# Patient Record
Sex: MALE | Race: WHITE | NOT HISPANIC OR LATINO | URBAN - METROPOLITAN AREA
[De-identification: names, ages, dates, MRNs, and addresses within clinical notes are randomized per-mention and may not be internally consistent; named-entity substitution may affect disease eponyms.]

---

## 2017-01-25 ENCOUNTER — INPATIENT (INPATIENT)
Facility: HOSPITAL | Age: 82
LOS: 4 days | Discharge: NURSING, REHAB OR LONG-TERM CARE | DRG: 684 | End: 2017-01-30
Attending: INTERNAL MEDICINE | Admitting: INTERNAL MEDICINE
Payer: MEDICARE

## 2017-01-25 VITALS — WEIGHT: 195.11 LBS | HEIGHT: 67 IN

## 2017-01-25 DIAGNOSIS — I21.4 NON-ST ELEVATION (NSTEMI) MYOCARDIAL INFARCTION: ICD-10-CM
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
## 2017-01-25 LAB
ALBUMIN SERPL ELPH-MCNC: 3.8 G/DL — SIGNIFICANT CHANGE UP (ref 3.5–5)
ALP SERPL-CCNC: 58 U/L — SIGNIFICANT CHANGE UP (ref 40–120)
ALT FLD-CCNC: 33 U/L DA — SIGNIFICANT CHANGE UP (ref 10–60)
ANION GAP SERPL CALC-SCNC: 8 MMOL/L — SIGNIFICANT CHANGE UP (ref 5–17)
APPEARANCE UR: CLEAR — SIGNIFICANT CHANGE UP
APTT BLD: 32.2 SEC — SIGNIFICANT CHANGE UP (ref 27.5–37.4)
AST SERPL-CCNC: 49 U/L — HIGH (ref 10–40)
BASOPHILS # BLD AUTO: 0.1 K/UL — SIGNIFICANT CHANGE UP (ref 0–0.2)
BASOPHILS NFR BLD AUTO: 1.2 % — SIGNIFICANT CHANGE UP (ref 0–2)
BILIRUB SERPL-MCNC: 0.8 MG/DL — SIGNIFICANT CHANGE UP (ref 0.2–1.2)
BILIRUB UR-MCNC: NEGATIVE — SIGNIFICANT CHANGE UP
BUN SERPL-MCNC: 20 MG/DL — HIGH (ref 7–18)
CALCIUM SERPL-MCNC: 9.4 MG/DL — SIGNIFICANT CHANGE UP (ref 8.4–10.5)
CHLORIDE SERPL-SCNC: 99 MMOL/L — SIGNIFICANT CHANGE UP (ref 96–108)
CK SERPL-CCNC: 1147 U/L — HIGH (ref 35–232)
CO2 SERPL-SCNC: 28 MMOL/L — SIGNIFICANT CHANGE UP (ref 22–31)
COLOR SPEC: YELLOW — SIGNIFICANT CHANGE UP
CREAT SERPL-MCNC: 1.32 MG/DL — HIGH (ref 0.5–1.3)
DIFF PNL FLD: ABNORMAL
EOSINOPHIL # BLD AUTO: 0 K/UL — SIGNIFICANT CHANGE UP (ref 0–0.5)
EOSINOPHIL NFR BLD AUTO: 0.1 % — SIGNIFICANT CHANGE UP (ref 0–6)
GLUCOSE SERPL-MCNC: 105 MG/DL — HIGH (ref 70–99)
GLUCOSE UR QL: NEGATIVE — SIGNIFICANT CHANGE UP
HCT VFR BLD CALC: 49.9 % — SIGNIFICANT CHANGE UP (ref 39–50)
HGB BLD-MCNC: 16.2 G/DL — SIGNIFICANT CHANGE UP (ref 13–17)
INR BLD: 1.24 RATIO — HIGH (ref 0.88–1.16)
KETONES UR-MCNC: ABNORMAL
LEUKOCYTE ESTERASE UR-ACNC: NEGATIVE — SIGNIFICANT CHANGE UP
LYMPHOCYTES # BLD AUTO: 0.6 K/UL — LOW (ref 1–3.3)
LYMPHOCYTES # BLD AUTO: 7.7 % — LOW (ref 13–44)
MAGNESIUM SERPL-MCNC: 2 MG/DL — SIGNIFICANT CHANGE UP (ref 1.8–2.4)
MCHC RBC-ENTMCNC: 31.1 PG — SIGNIFICANT CHANGE UP (ref 27–34)
MCHC RBC-ENTMCNC: 32.4 GM/DL — SIGNIFICANT CHANGE UP (ref 32–36)
MCV RBC AUTO: 96.2 FL — SIGNIFICANT CHANGE UP (ref 80–100)
MONOCYTES # BLD AUTO: 0.8 K/UL — SIGNIFICANT CHANGE UP (ref 0–0.9)
MONOCYTES NFR BLD AUTO: 11 % — SIGNIFICANT CHANGE UP (ref 2–14)
NEUTROPHILS # BLD AUTO: 6.1 K/UL — SIGNIFICANT CHANGE UP (ref 1.8–7.4)
NEUTROPHILS NFR BLD AUTO: 79.9 % — HIGH (ref 43–77)
NITRITE UR-MCNC: NEGATIVE — SIGNIFICANT CHANGE UP
NT-PROBNP SERPL-SCNC: 2660 PG/ML — HIGH (ref 0–450)
PH UR: 6 — SIGNIFICANT CHANGE UP (ref 4.8–8)
PLATELET # BLD AUTO: 167 K/UL — SIGNIFICANT CHANGE UP (ref 150–400)
POTASSIUM SERPL-MCNC: 4.5 MMOL/L — SIGNIFICANT CHANGE UP (ref 3.5–5.3)
POTASSIUM SERPL-SCNC: 4.5 MMOL/L — SIGNIFICANT CHANGE UP (ref 3.5–5.3)
PROT SERPL-MCNC: 7.3 G/DL — SIGNIFICANT CHANGE UP (ref 6–8.3)
PROT UR-MCNC: 15
PROTHROM AB SERPL-ACNC: 13.9 SEC — HIGH (ref 10–13.1)
RBC # BLD: 5.19 M/UL — SIGNIFICANT CHANGE UP (ref 4.2–5.8)
RBC # FLD: 12.4 % — SIGNIFICANT CHANGE UP (ref 10.3–14.5)
SODIUM SERPL-SCNC: 135 MMOL/L — SIGNIFICANT CHANGE UP (ref 135–145)
SP GR SPEC: 1.02 — SIGNIFICANT CHANGE UP (ref 1.01–1.02)
TROPONIN I SERPL-MCNC: 0.23 NG/ML — HIGH (ref 0–0.04)
UROBILINOGEN FLD QL: NEGATIVE — SIGNIFICANT CHANGE UP
WBC # BLD: 7.6 K/UL — SIGNIFICANT CHANGE UP (ref 3.8–10.5)
WBC # FLD AUTO: 7.6 K/UL — SIGNIFICANT CHANGE UP (ref 3.8–10.5)

## 2017-01-25 PROCEDURE — 71010: CPT | Mod: 26

## 2017-01-25 PROCEDURE — 99284 EMERGENCY DEPT VISIT MOD MDM: CPT

## 2017-01-25 PROCEDURE — 70450 CT HEAD/BRAIN W/O DYE: CPT | Mod: 26

## 2017-01-25 RX ORDER — ASPIRIN/CALCIUM CARB/MAGNESIUM 324 MG
325 TABLET ORAL ONCE
Qty: 0 | Refills: 0 | Status: COMPLETED | OUTPATIENT
Start: 2017-01-25 | End: 2017-01-25

## 2017-01-25 RX ORDER — ALBUTEROL 90 UG/1
2.5 AEROSOL, METERED ORAL ONCE
Qty: 0 | Refills: 0 | Status: COMPLETED | OUTPATIENT
Start: 2017-01-25 | End: 2017-01-25

## 2017-01-25 RX ORDER — AZITHROMYCIN 500 MG/1
500 TABLET, FILM COATED ORAL ONCE
Qty: 0 | Refills: 0 | Status: COMPLETED | OUTPATIENT
Start: 2017-01-25 | End: 2017-01-25

## 2017-01-25 RX ORDER — CEFTRIAXONE 500 MG/1
1 INJECTION, POWDER, FOR SOLUTION INTRAMUSCULAR; INTRAVENOUS ONCE
Qty: 0 | Refills: 0 | Status: COMPLETED | OUTPATIENT
Start: 2017-01-25 | End: 2017-01-25

## 2017-01-25 RX ADMIN — ALBUTEROL 2.5 MILLIGRAM(S): 90 AEROSOL, METERED ORAL at 21:49

## 2017-01-25 RX ADMIN — Medication 325 MILLIGRAM(S): at 21:50

## 2017-01-25 RX ADMIN — AZITHROMYCIN 250 MILLIGRAM(S): 500 TABLET, FILM COATED ORAL at 21:50

## 2017-01-25 RX ADMIN — CEFTRIAXONE 100 GRAM(S): 500 INJECTION, POWDER, FOR SOLUTION INTRAMUSCULAR; INTRAVENOUS at 21:49

## 2017-01-25 NOTE — ED PROVIDER NOTE - NS ED MD SCRIBE ATTENDING SCRIBE SECTIONS
PHYSICAL EXAM/PAST MEDICAL/SURGICAL/SOCIAL HISTORY/DISPOSITION/HISTORY OF PRESENT ILLNESS/HIV/REVIEW OF SYSTEMS/VITAL SIGNS( Pullset)

## 2017-01-25 NOTE — ED PROVIDER NOTE - CARE PLAN
Principal Discharge DX:	NSTEMI, initial episode of care  Secondary Diagnosis:	Pneumonia due to infectious organism, unspecified laterality, unspecified part of lung

## 2017-01-25 NOTE — ED PROVIDER NOTE - PROGRESS NOTE DETAILS
Discussed case with Dr. Warren who states that patient does not have normal EKG in the past.  Also discussed case with patient's cardiologist, Dr. Dutton who will see patient in AM. Patient denies any chest pain in the past or leading to fall and is currently chest pain free. In discussion w cardiology fellow, patient will not require cardiac intervention at this time as he is chest pain free. EKG w frequent PVC's but same underlying rhythm

## 2017-01-26 DIAGNOSIS — Z41.8 ENCOUNTER FOR OTHER PROCEDURES FOR PURPOSES OTHER THAN REMEDYING HEALTH STATE: ICD-10-CM

## 2017-01-26 DIAGNOSIS — J18.9 PNEUMONIA, UNSPECIFIED ORGANISM: ICD-10-CM

## 2017-01-26 DIAGNOSIS — R31.0 GROSS HEMATURIA: ICD-10-CM

## 2017-01-26 DIAGNOSIS — R79.89 OTHER SPECIFIED ABNORMAL FINDINGS OF BLOOD CHEMISTRY: ICD-10-CM

## 2017-01-26 DIAGNOSIS — T79.6XXA TRAUMATIC ISCHEMIA OF MUSCLE, INITIAL ENCOUNTER: ICD-10-CM

## 2017-01-26 DIAGNOSIS — I10 ESSENTIAL (PRIMARY) HYPERTENSION: ICD-10-CM
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
## 2017-01-26 DIAGNOSIS — N17.9 ACUTE KIDNEY FAILURE, UNSPECIFIED: ICD-10-CM

## 2017-01-26 DIAGNOSIS — J06.9 ACUTE UPPER RESPIRATORY INFECTION, UNSPECIFIED: ICD-10-CM

## 2017-01-26 LAB
24R-OH-CALCIDIOL SERPL-MCNC: 43.1 NG/ML — SIGNIFICANT CHANGE UP (ref 30–100)
ALBUMIN SERPL ELPH-MCNC: 3.7 G/DL — SIGNIFICANT CHANGE UP (ref 3.5–5)
ALP SERPL-CCNC: 55 U/L — SIGNIFICANT CHANGE UP (ref 40–120)
ALT FLD-CCNC: 35 U/L DA — SIGNIFICANT CHANGE UP (ref 10–60)
ANION GAP SERPL CALC-SCNC: 10 MMOL/L — SIGNIFICANT CHANGE UP (ref 5–17)
APPEARANCE UR: CLEAR — SIGNIFICANT CHANGE UP
AST SERPL-CCNC: 82 U/L — HIGH (ref 10–40)
BILIRUB DIRECT SERPL-MCNC: 0.2 MG/DL — SIGNIFICANT CHANGE UP (ref 0–0.2)
BILIRUB INDIRECT FLD-MCNC: 0.4 MG/DL — SIGNIFICANT CHANGE UP (ref 0.2–1)
BILIRUB SERPL-MCNC: 0.6 MG/DL — SIGNIFICANT CHANGE UP (ref 0.2–1.2)
BILIRUB UR-MCNC: NEGATIVE — SIGNIFICANT CHANGE UP
BUN SERPL-MCNC: 18 MG/DL — SIGNIFICANT CHANGE UP (ref 7–18)
CALCIUM SERPL-MCNC: 8.7 MG/DL — SIGNIFICANT CHANGE UP (ref 8.4–10.5)
CHLORIDE SERPL-SCNC: 98 MMOL/L — SIGNIFICANT CHANGE UP (ref 96–108)
CHOLEST SERPL-MCNC: 109 MG/DL — SIGNIFICANT CHANGE UP (ref 10–199)
CK MB BLD-MCNC: 0.3 % — SIGNIFICANT CHANGE UP (ref 0–3.5)
CK MB CFR SERPL CALC: 10.3 NG/ML — HIGH (ref 0–3.6)
CK MB CFR SERPL CALC: 8.1 NG/ML — HIGH (ref 0–3.6)
CK SERPL-CCNC: 2565 U/L — HIGH (ref 35–232)
CO2 SERPL-SCNC: 26 MMOL/L — SIGNIFICANT CHANGE UP (ref 22–31)
COLOR SPEC: YELLOW — SIGNIFICANT CHANGE UP
CREAT SERPL-MCNC: 1.22 MG/DL — SIGNIFICANT CHANGE UP (ref 0.5–1.3)
CULTURE RESULTS: SIGNIFICANT CHANGE UP
DIFF PNL FLD: ABNORMAL
GLUCOSE SERPL-MCNC: 93 MG/DL — SIGNIFICANT CHANGE UP (ref 70–99)
GLUCOSE UR QL: NEGATIVE — SIGNIFICANT CHANGE UP
HBA1C BLD-MCNC: 6 % — HIGH (ref 4–5.6)
HCT VFR BLD CALC: 47.8 % — SIGNIFICANT CHANGE UP (ref 39–50)
HDLC SERPL-MCNC: 62 MG/DL — SIGNIFICANT CHANGE UP (ref 40–125)
HGB BLD-MCNC: 15.7 G/DL — SIGNIFICANT CHANGE UP (ref 13–17)
KETONES UR-MCNC: ABNORMAL
LEUKOCYTE ESTERASE UR-ACNC: NEGATIVE — SIGNIFICANT CHANGE UP
LIPID PNL WITH DIRECT LDL SERPL: 37 MG/DL — SIGNIFICANT CHANGE UP
MAGNESIUM SERPL-MCNC: 2 MG/DL — SIGNIFICANT CHANGE UP (ref 1.8–2.4)
MCHC RBC-ENTMCNC: 31.8 PG — SIGNIFICANT CHANGE UP (ref 27–34)
MCHC RBC-ENTMCNC: 32.8 GM/DL — SIGNIFICANT CHANGE UP (ref 32–36)
MCV RBC AUTO: 97.1 FL — SIGNIFICANT CHANGE UP (ref 80–100)
NITRITE UR-MCNC: NEGATIVE — SIGNIFICANT CHANGE UP
PH UR: 6 — SIGNIFICANT CHANGE UP (ref 4.8–8)
PHOSPHATE SERPL-MCNC: 2.7 MG/DL — SIGNIFICANT CHANGE UP (ref 2.5–4.5)
PLATELET # BLD AUTO: 168 K/UL — SIGNIFICANT CHANGE UP (ref 150–400)
POTASSIUM SERPL-MCNC: 4.2 MMOL/L — SIGNIFICANT CHANGE UP (ref 3.5–5.3)
POTASSIUM SERPL-SCNC: 4.2 MMOL/L — SIGNIFICANT CHANGE UP (ref 3.5–5.3)
PROT SERPL-MCNC: 7 G/DL — SIGNIFICANT CHANGE UP (ref 6–8.3)
PROT UR-MCNC: 15
RBC # BLD: 4.92 M/UL — SIGNIFICANT CHANGE UP (ref 4.2–5.8)
RBC # FLD: 12.3 % — SIGNIFICANT CHANGE UP (ref 10.3–14.5)
SODIUM SERPL-SCNC: 134 MMOL/L — LOW (ref 135–145)
SP GR SPEC: 1.02 — SIGNIFICANT CHANGE UP (ref 1.01–1.02)
SPECIMEN SOURCE: SIGNIFICANT CHANGE UP
TOTAL CHOLESTEROL/HDL RATIO MEASUREMENT: 1.8 RATIO — LOW (ref 3.4–9.6)
TRIGL SERPL-MCNC: 48 MG/DL — SIGNIFICANT CHANGE UP (ref 10–149)
TROPONIN I SERPL-MCNC: 0.39 NG/ML — HIGH (ref 0–0.04)
TROPONIN I SERPL-MCNC: 0.39 NG/ML — HIGH (ref 0–0.04)
TSH SERPL-MCNC: 0.76 UU/ML — SIGNIFICANT CHANGE UP (ref 0.34–4.82)
UROBILINOGEN FLD QL: NEGATIVE — SIGNIFICANT CHANGE UP
WBC # BLD: 6.6 K/UL — SIGNIFICANT CHANGE UP (ref 3.8–10.5)
WBC # FLD AUTO: 6.6 K/UL — SIGNIFICANT CHANGE UP (ref 3.8–10.5)

## 2017-01-26 PROCEDURE — 73502 X-RAY EXAM HIP UNI 2-3 VIEWS: CPT | Mod: 26,LT

## 2017-01-26 PROCEDURE — 71010: CPT | Mod: 26

## 2017-01-26 RX ORDER — SIMVASTATIN 20 MG/1
0 TABLET, FILM COATED ORAL
Qty: 0 | Refills: 0 | COMMUNITY

## 2017-01-26 RX ORDER — LISINOPRIL 2.5 MG/1
0 TABLET ORAL
Qty: 0 | Refills: 0 | COMMUNITY

## 2017-01-26 RX ORDER — SIMVASTATIN 20 MG/1
10 TABLET, FILM COATED ORAL AT BEDTIME
Qty: 0 | Refills: 0 | Status: DISCONTINUED | OUTPATIENT
Start: 2017-01-26 | End: 2017-01-26

## 2017-01-26 RX ORDER — SODIUM CHLORIDE 9 MG/ML
1000 INJECTION INTRAMUSCULAR; INTRAVENOUS; SUBCUTANEOUS
Qty: 0 | Refills: 0 | Status: DISCONTINUED | OUTPATIENT
Start: 2017-01-26 | End: 2017-01-26

## 2017-01-26 RX ORDER — TERAZOSIN HYDROCHLORIDE 10 MG/1
0 CAPSULE ORAL
Qty: 0 | Refills: 0 | COMMUNITY

## 2017-01-26 RX ORDER — SIMVASTATIN 20 MG/1
20 TABLET, FILM COATED ORAL AT BEDTIME
Qty: 0 | Refills: 0 | Status: DISCONTINUED | OUTPATIENT
Start: 2017-01-26 | End: 2017-01-30

## 2017-01-26 RX ORDER — CEFTRIAXONE 500 MG/1
1 INJECTION, POWDER, FOR SOLUTION INTRAMUSCULAR; INTRAVENOUS EVERY 24 HOURS
Qty: 0 | Refills: 0 | Status: DISCONTINUED | OUTPATIENT
Start: 2017-01-26 | End: 2017-01-26

## 2017-01-26 RX ORDER — DOXAZOSIN MESYLATE 4 MG
4 TABLET ORAL AT BEDTIME
Qty: 0 | Refills: 0 | Status: DISCONTINUED | OUTPATIENT
Start: 2017-01-26 | End: 2017-01-30

## 2017-01-26 RX ORDER — HEPARIN SODIUM 5000 [USP'U]/ML
5000 INJECTION INTRAVENOUS; SUBCUTANEOUS EVERY 12 HOURS
Qty: 0 | Refills: 0 | Status: DISCONTINUED | OUTPATIENT
Start: 2017-01-26 | End: 2017-01-30

## 2017-01-26 RX ORDER — FAMOTIDINE 10 MG/ML
20 INJECTION INTRAVENOUS DAILY
Qty: 0 | Refills: 0 | Status: DISCONTINUED | OUTPATIENT
Start: 2017-01-26 | End: 2017-01-30

## 2017-01-26 RX ORDER — ASPIRIN/CALCIUM CARB/MAGNESIUM 324 MG
81 TABLET ORAL
Qty: 0 | Refills: 0 | Status: DISCONTINUED | OUTPATIENT
Start: 2017-01-26 | End: 2017-01-30

## 2017-01-26 RX ORDER — LACTOBACILLUS ACIDOPHILUS 100MM CELL
1 CAPSULE ORAL
Qty: 0 | Refills: 0 | Status: DISCONTINUED | OUTPATIENT
Start: 2017-01-26 | End: 2017-01-30

## 2017-01-26 RX ORDER — METOPROLOL TARTRATE 50 MG
25 TABLET ORAL EVERY 8 HOURS
Qty: 0 | Refills: 0 | Status: DISCONTINUED | OUTPATIENT
Start: 2017-01-26 | End: 2017-01-30

## 2017-01-26 RX ORDER — IPRATROPIUM/ALBUTEROL SULFATE 18-103MCG
3 AEROSOL WITH ADAPTER (GRAM) INHALATION EVERY 6 HOURS
Qty: 0 | Refills: 0 | Status: DISCONTINUED | OUTPATIENT
Start: 2017-01-26 | End: 2017-01-30

## 2017-01-26 RX ORDER — METOPROLOL TARTRATE 50 MG
0 TABLET ORAL
Qty: 0 | Refills: 0 | COMMUNITY

## 2017-01-26 RX ORDER — METOPROLOL TARTRATE 50 MG
12.5 TABLET ORAL DAILY
Qty: 0 | Refills: 0 | Status: DISCONTINUED | OUTPATIENT
Start: 2017-01-26 | End: 2017-01-26

## 2017-01-26 RX ORDER — LISINOPRIL 2.5 MG/1
10 TABLET ORAL DAILY
Qty: 0 | Refills: 0 | Status: DISCONTINUED | OUTPATIENT
Start: 2017-01-26 | End: 2017-01-30

## 2017-01-26 RX ORDER — AZITHROMYCIN 500 MG/1
500 TABLET, FILM COATED ORAL EVERY 24 HOURS
Qty: 0 | Refills: 0 | Status: DISCONTINUED | OUTPATIENT
Start: 2017-01-26 | End: 2017-01-26

## 2017-01-26 RX ADMIN — HEPARIN SODIUM 5000 UNIT(S): 5000 INJECTION INTRAVENOUS; SUBCUTANEOUS at 18:14

## 2017-01-26 RX ADMIN — CEFTRIAXONE 100 GRAM(S): 500 INJECTION, POWDER, FOR SOLUTION INTRAMUSCULAR; INTRAVENOUS at 06:39

## 2017-01-26 RX ADMIN — Medication 3 MILLILITER(S): at 15:40

## 2017-01-26 RX ADMIN — Medication 200 MILLIGRAM(S): at 13:36

## 2017-01-26 RX ADMIN — SIMVASTATIN 20 MILLIGRAM(S): 20 TABLET, FILM COATED ORAL at 22:15

## 2017-01-26 RX ADMIN — FAMOTIDINE 20 MILLIGRAM(S): 10 INJECTION INTRAVENOUS at 13:36

## 2017-01-26 RX ADMIN — Medication 1 TABLET(S): at 08:00

## 2017-01-26 RX ADMIN — Medication 3 MILLILITER(S): at 09:25

## 2017-01-26 RX ADMIN — Medication 200 MILLIGRAM(S): at 18:16

## 2017-01-26 RX ADMIN — Medication 1 TABLET(S): at 18:14

## 2017-01-26 RX ADMIN — Medication 25 MILLIGRAM(S): at 22:15

## 2017-01-26 RX ADMIN — Medication 1 TABLET(S): at 13:35

## 2017-01-26 RX ADMIN — Medication 3 MILLILITER(S): at 20:11

## 2017-01-26 RX ADMIN — Medication 200 MILLIGRAM(S): at 06:40

## 2017-01-26 RX ADMIN — Medication 81 MILLIGRAM(S): at 06:41

## 2017-01-26 RX ADMIN — Medication 4 MILLIGRAM(S): at 22:15

## 2017-01-26 RX ADMIN — AZITHROMYCIN 250 MILLIGRAM(S): 500 TABLET, FILM COATED ORAL at 06:38

## 2017-01-26 RX ADMIN — Medication 81 MILLIGRAM(S): at 18:14

## 2017-01-26 RX ADMIN — SODIUM CHLORIDE 100 MILLILITER(S): 9 INJECTION INTRAMUSCULAR; INTRAVENOUS; SUBCUTANEOUS at 01:59

## 2017-01-26 RX ADMIN — Medication 12.5 MILLIGRAM(S): at 06:42

## 2017-01-26 RX ADMIN — SODIUM CHLORIDE 75 MILLILITER(S): 9 INJECTION INTRAMUSCULAR; INTRAVENOUS; SUBCUTANEOUS at 07:59

## 2017-01-26 RX ADMIN — SODIUM CHLORIDE 75 MILLILITER(S): 9 INJECTION INTRAMUSCULAR; INTRAVENOUS; SUBCUTANEOUS at 06:04

## 2017-01-26 RX ADMIN — LISINOPRIL 10 MILLIGRAM(S): 2.5 TABLET ORAL at 06:41

## 2017-01-26 NOTE — H&P ADULT. - PROBLEM SELECTOR PLAN 7
Blood noted at urethral meatus overnight.  None now.  U/A with + RBC in addition to dipstick positive.    Will f/u Urine culture, but doubt infection.  D/c Abx.  Outpatient urology f/u.

## 2017-01-26 NOTE — H&P ADULT. - PROBLEM SELECTOR PLAN 3
Likely 2* rhabdomyolysis and LUANNE  Admitted to tele to r/o ACS  EKG Sinus,  ms- first degree AV block, LAD, Trigeminy, RBBB (EKG changes same as old EKG)  c/w ASA, statin and Toprol  trend cardiac enzymes Baseline Cr Unknown  BUN: Cr <20  f/u urine lytes  c/w gentle hydration  avoid nephrotoxic drugs Doubt PNA.  D/C ABX  Pulmonary, Dr. Maddox to evaluate.

## 2017-01-26 NOTE — H&P ADULT. - NEUROLOGICAL DETAILS
alert and oriented x 3/superficial reflexes intact/deep reflexes intact/sensation intact/no spontaneous movement/cranial nerves intact

## 2017-01-26 NOTE — H&P ADULT. - HISTORY OF PRESENT ILLNESS
89 M from home, lives alone walks independently PMHx of HTN, BPH, came with difficulty ambulation s/p mechanical fall. Patient was trying to stand from sitting position when he lost his balance and fell on his back. He fell on a wooden table and broke the table. He denies hitting of his head or hips on ground. He was unable to stand up after the incident. He is complaining of left buttock pain with decreased strength in left leg. He was on floor for almost 1 hr. He managed to get to telephone and called his son in law who came and helped patient to come to hospital. Patient denies any lightheadedness or LOC during the incident. He is also complaining of cough and runny nose for last 3 day. Cough is productive with non bloody yellowish sputum, cough is associated with generalized weakness, runny nose. He was smoker in past and quit 35 years ago.    Patient denies fever, chest pain, nausea, vomiting, respiratory distress, rash, hematochezia, orthopnea, PND, current smoking, alcohol abuse and illicit drug use.

## 2017-01-26 NOTE — H&P ADULT. - PROBLEM SELECTOR PLAN 4
c/w home medications with parameters Likely 2* rhabdomyolysis and LUANNE  Admitted to tele to r/o ACS  EKG Sinus,  ms- first degree AV block, LAD, Trigeminy, RBBB (EKG changes same as old EKG)  c/w ASA, statin and Toprol  trend cardiac enzymes Likely 2* rhabdomyolysis and LUANNE  Admitted to tele to r/o ACS  EKG Sinus,  ms- first degree AV block, LAD, Trigeminy, RBBB (EKG changes same as old EKG)  c/w ASA and Toprol  Holding statin for now because concerns of Rhabdomyolysis  trend cardiac enzymes Likely hemodynamically-mediated.  IVF as above.    f/u urine lytes  c/w gentle hydration  avoid nephrotoxic drugs

## 2017-01-26 NOTE — H&P ADULT. - PROBLEM SELECTOR PLAN 1
Community Acquired Pneumonia  CURB 65 score 2  Afebrile, no leukocytosis  f/u BCx  CXR Left lung opacities  starting on Azithromycin and ceftriaxone  c/w gentle hydration Rhabdomyolysis s/p trauma-  Continue IV NS @ 75ml/hr.  Rhabdomyolysis is worsening from last night, but will not increase at this time until after cardiology has evaluated patient.

## 2017-01-26 NOTE — PHYSICAL THERAPY INITIAL EVALUATION ADULT - CRITERIA FOR SKILLED THERAPEUTIC INTERVENTIONS
risk reduction/prevention/functional limitations in following categories/rehab potential/impairments found

## 2017-01-26 NOTE — H&P ADULT. - PROBLEM SELECTOR PLAN 2
Likely 2* delayed postural reflexes  PNA also contributing to weakness  f/u x ray left hip  f/u PT consult  fall precaution

## 2017-01-26 NOTE — H&P ADULT. - PROBLEM SELECTOR PLAN 5
DVT and stress ulcer prophylaxis c/w home medications with parameters Likely 2* rhabdomyolysis  Admitted to tele to r/o ACS  Dr. Dutton to evaluate today- doubts ACS.  EKG Sinus,  ms- first degree AV block, LAD, Trigeminy, RBBB (EKG changes same as old EKG)  c/w ASA and Toprol  Holding statin for now because concerns of Rhabdomyolysis  trend cardiac enzymes

## 2017-01-26 NOTE — H&P ADULT. - ASSESSMENT
89 M from home, lives alone walks independently PMHx of HTN, BPH, came with difficulty ambulation s/p mechanical fall.

## 2017-01-26 NOTE — H&P ADULT. - GASTROINTESTINAL DETAILS
no distention/no bruit/no rebound tenderness/no guarding/nontender/no masses palpable/no rigidity/bowel sounds normal/soft/normal/no organomegaly

## 2017-01-27 LAB
ALBUMIN SERPL ELPH-MCNC: 3.2 G/DL — LOW (ref 3.5–5)
ALP SERPL-CCNC: 52 U/L — SIGNIFICANT CHANGE UP (ref 40–120)
ALT FLD-CCNC: 49 U/L DA — SIGNIFICANT CHANGE UP (ref 10–60)
ANION GAP SERPL CALC-SCNC: 11 MMOL/L — SIGNIFICANT CHANGE UP (ref 5–17)
AST SERPL-CCNC: 121 U/L — HIGH (ref 10–40)
BILIRUB SERPL-MCNC: 0.6 MG/DL — SIGNIFICANT CHANGE UP (ref 0.2–1.2)
BUN SERPL-MCNC: 16 MG/DL — SIGNIFICANT CHANGE UP (ref 7–18)
CALCIUM SERPL-MCNC: 8.5 MG/DL — SIGNIFICANT CHANGE UP (ref 8.4–10.5)
CHLORIDE SERPL-SCNC: 97 MMOL/L — SIGNIFICANT CHANGE UP (ref 96–108)
CK MB BLD-MCNC: 0.5 % — SIGNIFICANT CHANGE UP (ref 0–3.5)
CK MB CFR SERPL CALC: 13.9 NG/ML — HIGH (ref 0–3.6)
CK SERPL-CCNC: 2608 U/L — HIGH (ref 35–232)
CO2 SERPL-SCNC: 26 MMOL/L — SIGNIFICANT CHANGE UP (ref 22–31)
CREAT SERPL-MCNC: 1.19 MG/DL — SIGNIFICANT CHANGE UP (ref 0.5–1.3)
GLUCOSE SERPL-MCNC: 104 MG/DL — HIGH (ref 70–99)
HCT VFR BLD CALC: 45.3 % — SIGNIFICANT CHANGE UP (ref 39–50)
HGB BLD-MCNC: 15 G/DL — SIGNIFICANT CHANGE UP (ref 13–17)
MAGNESIUM SERPL-MCNC: 2 MG/DL — SIGNIFICANT CHANGE UP (ref 1.8–2.4)
MCHC RBC-ENTMCNC: 32 PG — SIGNIFICANT CHANGE UP (ref 27–34)
MCHC RBC-ENTMCNC: 33.2 GM/DL — SIGNIFICANT CHANGE UP (ref 32–36)
MCV RBC AUTO: 96.4 FL — SIGNIFICANT CHANGE UP (ref 80–100)
PHOSPHATE SERPL-MCNC: 2.7 MG/DL — SIGNIFICANT CHANGE UP (ref 2.5–4.5)
PLATELET # BLD AUTO: 156 K/UL — SIGNIFICANT CHANGE UP (ref 150–400)
POTASSIUM SERPL-MCNC: 4.2 MMOL/L — SIGNIFICANT CHANGE UP (ref 3.5–5.3)
POTASSIUM SERPL-SCNC: 4.2 MMOL/L — SIGNIFICANT CHANGE UP (ref 3.5–5.3)
PROT SERPL-MCNC: 6.4 G/DL — SIGNIFICANT CHANGE UP (ref 6–8.3)
RAPID RVP RESULT: DETECTED
RBC # BLD: 4.7 M/UL — SIGNIFICANT CHANGE UP (ref 4.2–5.8)
RBC # FLD: 11.9 % — SIGNIFICANT CHANGE UP (ref 10.3–14.5)
RSV RNA SPEC QL NAA+PROBE: DETECTED
SODIUM SERPL-SCNC: 134 MMOL/L — LOW (ref 135–145)
TROPONIN I SERPL-MCNC: 0.34 NG/ML — HIGH (ref 0–0.04)
VIT B12 SERPL-MCNC: 1430 PG/ML — HIGH (ref 243–894)
WBC # BLD: 6.1 K/UL — SIGNIFICANT CHANGE UP (ref 3.8–10.5)
WBC # FLD AUTO: 6.1 K/UL — SIGNIFICANT CHANGE UP (ref 3.8–10.5)

## 2017-01-27 RX ORDER — SODIUM CHLORIDE 9 MG/ML
1000 INJECTION INTRAMUSCULAR; INTRAVENOUS; SUBCUTANEOUS
Qty: 0 | Refills: 0 | Status: DISCONTINUED | OUTPATIENT
Start: 2017-01-27 | End: 2017-01-28

## 2017-01-27 RX ORDER — SODIUM CHLORIDE 9 MG/ML
1000 INJECTION INTRAMUSCULAR; INTRAVENOUS; SUBCUTANEOUS
Qty: 0 | Refills: 0 | Status: DISCONTINUED | OUTPATIENT
Start: 2017-01-27 | End: 2017-01-27

## 2017-01-27 RX ORDER — LOPERAMIDE HCL 2 MG
2 TABLET ORAL AT BEDTIME
Qty: 0 | Refills: 0 | Status: COMPLETED | OUTPATIENT
Start: 2017-01-27 | End: 2017-01-28

## 2017-01-27 RX ADMIN — Medication 81 MILLIGRAM(S): at 18:22

## 2017-01-27 RX ADMIN — FAMOTIDINE 20 MILLIGRAM(S): 10 INJECTION INTRAVENOUS at 13:30

## 2017-01-27 RX ADMIN — Medication 200 MILLIGRAM(S): at 06:03

## 2017-01-27 RX ADMIN — Medication 1 TABLET(S): at 08:21

## 2017-01-27 RX ADMIN — Medication 3 MILLILITER(S): at 20:39

## 2017-01-27 RX ADMIN — Medication 3 MILLILITER(S): at 09:44

## 2017-01-27 RX ADMIN — SODIUM CHLORIDE 100 MILLILITER(S): 9 INJECTION INTRAMUSCULAR; INTRAVENOUS; SUBCUTANEOUS at 13:35

## 2017-01-27 RX ADMIN — Medication 3 MILLILITER(S): at 15:34

## 2017-01-27 RX ADMIN — Medication 200 MILLIGRAM(S): at 13:31

## 2017-01-27 RX ADMIN — Medication 25 MILLIGRAM(S): at 13:30

## 2017-01-27 RX ADMIN — Medication 81 MILLIGRAM(S): at 06:04

## 2017-01-27 RX ADMIN — Medication 4 MILLIGRAM(S): at 22:05

## 2017-01-27 RX ADMIN — Medication 200 MILLIGRAM(S): at 18:21

## 2017-01-27 RX ADMIN — SODIUM CHLORIDE 75 MILLILITER(S): 9 INJECTION INTRAMUSCULAR; INTRAVENOUS; SUBCUTANEOUS at 18:19

## 2017-01-27 RX ADMIN — Medication 1 TABLET(S): at 13:30

## 2017-01-27 RX ADMIN — SIMVASTATIN 20 MILLIGRAM(S): 20 TABLET, FILM COATED ORAL at 22:05

## 2017-01-27 RX ADMIN — HEPARIN SODIUM 5000 UNIT(S): 5000 INJECTION INTRAVENOUS; SUBCUTANEOUS at 18:21

## 2017-01-27 RX ADMIN — LISINOPRIL 10 MILLIGRAM(S): 2.5 TABLET ORAL at 06:04

## 2017-01-27 RX ADMIN — Medication 2 MILLIGRAM(S): at 22:05

## 2017-01-27 RX ADMIN — Medication 200 MILLIGRAM(S): at 23:45

## 2017-01-27 RX ADMIN — HEPARIN SODIUM 5000 UNIT(S): 5000 INJECTION INTRAVENOUS; SUBCUTANEOUS at 06:04

## 2017-01-27 RX ADMIN — Medication 1 TABLET(S): at 18:21

## 2017-01-27 RX ADMIN — Medication 3 MILLILITER(S): at 02:13

## 2017-01-27 RX ADMIN — Medication 25 MILLIGRAM(S): at 06:04

## 2017-01-27 NOTE — DISCHARGE NOTE ADULT - HOSPITAL COURSE
89 M from home, lives alone walks independently PMHx of HTN, BPH, came with difficulty ambulation s/p mechanical fall.    Admitted for traumatic rhabdomyolysis, with CK on admission is 1147, which then rises to 2565,. X ray left hip is negative for fracture. CT head no acute chnages, no bleed. IVF given at 75 cc x hr. CK trended to 2083. PT recommended LEIGHTON. Fall [recautions taken.  On routine labs Vitamin D, TSH, LFTs with slight elevation of AST, , Lipid profile, WNL. HBA1c 6.     Elevated troponins with concern for ACS. T1 0.232, T2 0.386, T3 0.392. EKG shows  Sinus,  ms- first degree AV block, LAD, Trigeminy, RBBB (EKG changes same as old EKG). Admitted to tele to r/o ACS,  c/w ASA and Toprol. Holding statin for now because concerns of Rhabdomyolysis. Dr Dutton is primary cardiologist. restarted later As per recommdnation, EKG similar to previous EKG, less likley due to ischeimia. ECHO shows EF 55-60%, grade 1 DD.      Also on admission has cough , with no fever and WBC count. Concern for viral respiratory tract infection. Doubt Pneumonia, Initial CXR shows left lung opacities, Initially started on zithroamx and ceftrioxone, then later discontinued. Pulmonology Dr Maddox consulted. . CURB 65 score 2. Repeat CXR was clear. Influenza panel shows RSVP. Contact/Dropplet precaution taken. Slight wheezing, started on solumedrol.     LUANNE:  Baseline Cr Unknown. On admission Cr is 1.31, later came back to normal. BUN: Cr <20.  Urine lytes shows elevated protein to 25, FeNa is 0.5, Cr improve with IVF. ,     Hypertensio. Continued with home medications with parameters    DVT and stress ulcer prophylaxis, with heparin and protonix.     During hospitilization reports gross hematuria. Blood noted at urethral meatus overnight.    U/A with + RBC in addition to dipstick positive.  Urine culture negative, but doubt infection.    Outpatient urology f/u. recommended.     Patient stable to be discharged. CK finally trended down to-----------. F/u with PCP and cardiologist in 1 week. Discussed with attending. 89 M from home, lives alone walks independently PMHx of HTN, BPH, came with difficulty ambulation s/p mechanical fall.    Admitted for traumatic rhabdomyolysis, with CK on admission is 1147, which then rises to 2565,. X ray left hip is negative for fracture. CT head no acute chnages, no bleed. IVF given at 75 cc x hr. CK trended to 2083. PT recommended LEIGHTON. Fall precautions taken. On routine labs Vitamin D, TSH, LFTs with slight elevation of AST, , Lipid profile, WNL. HBA1c 6.   Elevated troponin with concern for ACS. T1 0.232, T2 0.386, T3 0.392. EKG shows  Sinus,  ms- first degree AV block, LAD, Trigeminy, RBBB (EKG changes same as old EKG). Admitted to tele to r/o ACS,  c/w ASA and Toprol. Holding statin for now because concerns of Rhabdomyolysis. Dr Rocky feror is primary cardiologist. restarted later As per recommendation EKG similar to previous EKG, less likely due to ischemia ECHO shows EF 55-60%, grade 1 DD.      Also on admission has cough , with no fever and WBC count. Concern for viral respiratory tract infection. Doubt Pneumonia, Initial CXR shows left lung opacities, Initially started on Zithromax and ceftriaxone then later discontinued. Pulmonology Dr Maddox consulted. . CURB 65 score 2. Repeat CXR was clear. Influenza panel shows RSVP. Contact/Dropplet precaution taken. Slight wheezing, started on solumedrol.  For LUANNE:  Baseline Cr Unknown. On admission Cr is 1.31, later came back to normal. BUN: Cr <20.  Urine lytes shows elevated protein to 25, FeNa is 0.5, Cr improve with IVF. , For Hypertension . Continued with home medications with parameters . DVT and stress ulcer prophylaxis, with heparin and protonix.   During hospitalization reports gross hematuria. Blood noted at urethral meatus overnight.    U/A with + RBC in addition to dipstick positive.  Urine culture negative, but doubt infection.    Outpatient urology f/u. recommended.     Patient stable to be discharged. CK finally trended down to 1569-. F/u with PCP and cardiologist in 1 week. Take prednisone taper as prescribed. Discussed with attending.

## 2017-01-27 NOTE — DISCHARGE NOTE ADULT - MEDICATION SUMMARY - MEDICATIONS TO STOP TAKING
I will STOP taking the medications listed below when I get home from the hospital:    lisinopril  --  by mouth    Hytrin  -- 5 milligram(s) by mouth once a day    pravastatin 20 mg oral tablet  -- 1 tab(s) by mouth once a day

## 2017-01-27 NOTE — DISCHARGE NOTE ADULT - PLAN OF CARE
Prevent the further episodes Your CK levels initially high then trended down. F/u with PCP for further recommendations. C/w supportive care Likely due to rhabdomyolysis Not elevated due to ischemia. F/u with Dr Dutton outpatient. C/e aspirin, statin, beta blocker Prevent from worsening Likely due to rhabdomyolysis. Avoid nephrotoxic medications. F/u PCP for further recommendations Keep the BP < 140/90 C/w home medications. Cardura, fosinopril, metoprolol Take prednisone taper as prescribed. Since you have wheezing. Take proventil HFA as prescribed,. F/u PCP for further recommendations Prevent further epsidoes No more episodes of hematuria reported. Hb has been stable. Outpatient urology recommended.

## 2017-01-27 NOTE — DISCHARGE NOTE ADULT - PATIENT PORTAL LINK FT
“You can access the Follow[employer] Patient Portal, offered by [facility], by registering with the following website: [email or web address]”

## 2017-01-27 NOTE — DISCHARGE NOTE ADULT - CARE PROVIDER_API CALL
Aidan Dutton), Cardiovascular Disease; Internal Medicine  [address] [place] [address]  Phone: [phone]  Fax: [phone]

## 2017-01-27 NOTE — DISCHARGE NOTE ADULT - MEDICATION SUMMARY - MEDICATIONS TO TAKE
I will START or STAY ON the medications listed below when I get home from the hospital:    predniSONE 10 mg oral tablet  -- 4 tab once x 2 days, 3 tab once x 2 days, 2 tab once x 2 days, 1 tab once x 2 days, then stop  -- It is very important that you take or use this exactly as directed.  Do not skip doses or discontinue unless directed by your doctor.  Obtain medical advice before taking any non-prescription drugs as some may affect the action of this medication.  Take with food or milk.    -- Indication: For Acute bronchitis    aspirin 81 mg oral tablet  -- 1 tab(s) by mouth 2 times a day  -- Indication: For cardioprotective    fosinopril 10 mg oral tablet  -- 1 tab(s) by mouth once a day  -- Indication: For Hypertension    doxazosin 4 mg oral tablet  -- 1 tab(s) by mouth once a day (at bedtime)  -- Indication: For Hypertension    simvastatin 10 mg oral tablet  -- 1 tab(s) by mouth once a day (at bedtime)  -- Indication: For Hypercholesterolemia    metoprolol tartrate 25 mg oral tablet  -- 1 tab(s) by mouth every 8 hours  -- It is very important that you take or use this exactly as directed.  Do not skip doses or discontinue unless directed by your doctor.  May cause drowsiness.  Alcohol may intensify this effect.  Use care when operating dangerous machinery.  Some non-prescription drugs may aggravate your condition.  Read all labels carefully.  If a warning appears, check with your doctor before taking.  Take with food or milk.  This drug may impair the ability to drive or operate machinery.  Use care until you become familiar with its effects.    -- Indication: For Hypertension    Proventil HFA 90 mcg/inh inhalation aerosol  -- 2 puff(s) inhaled every 6 hours  -- For inhalation only.  It is very important that you take or use this exactly as directed.  Do not skip doses or discontinue unless directed by your doctor.  Obtain medical advice before taking any non-prescription drugs as some may affect the action of this medication.  Shake well before use.    -- Indication: For Acute bronchitis    lutein 20 mg oral tablet  -- 1 tab(s) by mouth once a day  -- Indication: For supplements

## 2017-01-27 NOTE — DISCHARGE NOTE ADULT - MEDICATION SUMMARY - MEDICATIONS TO CHANGE
I will SWITCH the dose or number of times a day I take the medications listed below when I get home from the hospital:    terazosin  --  by mouth    metoprolol succinate 25 mg oral tablet, extended release  -- 0.5 tab(s) by mouth once a day

## 2017-01-27 NOTE — DISCHARGE NOTE ADULT - SECONDARY DIAGNOSIS.
Troponin level elevated LUANNE (acute kidney injury) History of hypertension Viral upper respiratory tract infection Gross hematuria

## 2017-01-27 NOTE — DISCHARGE NOTE ADULT - CARE PLAN
Principal Discharge DX:	Traumatic rhabdomyolysis, initial encounter  Goal:	Prevent the further episodes  Instructions for follow-up, activity and diet:	Your CK levels initially high then trended down. F/u with PCP for further recommendations. C/w supportive care  Secondary Diagnosis:	Troponin level elevated  Goal:	Likely due to rhabdomyolysis  Instructions for follow-up, activity and diet:	Not elevated due to ischemia. F/u with Dr Dutton outpatient. C/e aspirin, statin, beta blocker  Secondary Diagnosis:	LUANNE (acute kidney injury)  Goal:	Prevent from worsening  Instructions for follow-up, activity and diet:	Likely due to rhabdomyolysis. Avoid nephrotoxic medications. F/u PCP for further recommendations  Secondary Diagnosis:	History of hypertension  Goal:	Keep the BP < 140/90  Instructions for follow-up, activity and diet:	C/w home medications. Cardura, fosinopril, metoprolol  Secondary Diagnosis:	Viral upper respiratory tract infection  Instructions for follow-up, activity and diet:	Take prednisone taper as prescribed. Since you have wheezing. Take proventil HFA as prescribed,. F/u PCP for further recommendations  Secondary Diagnosis:	Gross hematuria  Goal:	Prevent further epsidoes  Instructions for follow-up, activity and diet:	No more episodes of hematuria reported. Hb has been stable. Outpatient urology recommended.

## 2017-01-28 LAB
ALBUMIN SERPL ELPH-MCNC: 3.4 G/DL — LOW (ref 3.5–5)
ALP SERPL-CCNC: 52 U/L — SIGNIFICANT CHANGE UP (ref 40–120)
ALT FLD-CCNC: 62 U/L DA — HIGH (ref 10–60)
ANION GAP SERPL CALC-SCNC: 11 MMOL/L — SIGNIFICANT CHANGE UP (ref 5–17)
AST SERPL-CCNC: 120 U/L — HIGH (ref 10–40)
BILIRUB SERPL-MCNC: 0.7 MG/DL — SIGNIFICANT CHANGE UP (ref 0.2–1.2)
BUN SERPL-MCNC: 17 MG/DL — SIGNIFICANT CHANGE UP (ref 7–18)
CALCIUM SERPL-MCNC: 8.6 MG/DL — SIGNIFICANT CHANGE UP (ref 8.4–10.5)
CHLORIDE SERPL-SCNC: 102 MMOL/L — SIGNIFICANT CHANGE UP (ref 96–108)
CK SERPL-CCNC: 2083 U/L — HIGH (ref 35–232)
CO2 SERPL-SCNC: 24 MMOL/L — SIGNIFICANT CHANGE UP (ref 22–31)
CREAT ?TM UR-MCNC: 112 MG/DL — SIGNIFICANT CHANGE UP
CREAT SERPL-MCNC: 1.13 MG/DL — SIGNIFICANT CHANGE UP (ref 0.5–1.3)
GLUCOSE SERPL-MCNC: 100 MG/DL — HIGH (ref 70–99)
HCT VFR BLD CALC: 44 % — SIGNIFICANT CHANGE UP (ref 39–50)
HGB BLD-MCNC: 14.6 G/DL — SIGNIFICANT CHANGE UP (ref 13–17)
MAGNESIUM SERPL-MCNC: 2.1 MG/DL — SIGNIFICANT CHANGE UP (ref 1.8–2.4)
MCHC RBC-ENTMCNC: 32 PG — SIGNIFICANT CHANGE UP (ref 27–34)
MCHC RBC-ENTMCNC: 33.1 GM/DL — SIGNIFICANT CHANGE UP (ref 32–36)
MCV RBC AUTO: 96.8 FL — SIGNIFICANT CHANGE UP (ref 80–100)
OSMOLALITY UR: 511 MOS/KG — SIGNIFICANT CHANGE UP (ref 50–1200)
PHOSPHATE SERPL-MCNC: 2.2 MG/DL — LOW (ref 2.5–4.5)
PLATELET # BLD AUTO: 179 K/UL — SIGNIFICANT CHANGE UP (ref 150–400)
POTASSIUM SERPL-MCNC: 4.1 MMOL/L — SIGNIFICANT CHANGE UP (ref 3.5–5.3)
POTASSIUM SERPL-SCNC: 4.1 MMOL/L — SIGNIFICANT CHANGE UP (ref 3.5–5.3)
PROT ?TM UR-MCNC: 25 MG/DL — HIGH (ref 0–12)
PROT SERPL-MCNC: 6.7 G/DL — SIGNIFICANT CHANGE UP (ref 6–8.3)
RBC # BLD: 4.54 M/UL — SIGNIFICANT CHANGE UP (ref 4.2–5.8)
RBC # FLD: 12 % — SIGNIFICANT CHANGE UP (ref 10.3–14.5)
SODIUM SERPL-SCNC: 137 MMOL/L — SIGNIFICANT CHANGE UP (ref 135–145)
SODIUM UR-SCNC: 62 MMOL/L — SIGNIFICANT CHANGE UP (ref 40–220)
WBC # BLD: 6.9 K/UL — SIGNIFICANT CHANGE UP (ref 3.8–10.5)
WBC # FLD AUTO: 6.9 K/UL — SIGNIFICANT CHANGE UP (ref 3.8–10.5)

## 2017-01-28 PROCEDURE — 71010: CPT | Mod: 26

## 2017-01-28 RX ORDER — SODIUM,POTASSIUM PHOSPHATES 278-250MG
1 POWDER IN PACKET (EA) ORAL
Qty: 0 | Refills: 0 | Status: COMPLETED | OUTPATIENT
Start: 2017-01-28 | End: 2017-01-29

## 2017-01-28 RX ORDER — SODIUM CHLORIDE 9 MG/ML
1000 INJECTION INTRAMUSCULAR; INTRAVENOUS; SUBCUTANEOUS
Qty: 0 | Refills: 0 | Status: DISCONTINUED | OUTPATIENT
Start: 2017-01-28 | End: 2017-01-30

## 2017-01-28 RX ADMIN — Medication 1 TABLET(S): at 17:28

## 2017-01-28 RX ADMIN — FAMOTIDINE 20 MILLIGRAM(S): 10 INJECTION INTRAVENOUS at 12:46

## 2017-01-28 RX ADMIN — Medication 1 TABLET(S): at 21:07

## 2017-01-28 RX ADMIN — Medication 25 MILLIGRAM(S): at 06:36

## 2017-01-28 RX ADMIN — Medication 2 MILLIGRAM(S): at 21:07

## 2017-01-28 RX ADMIN — Medication 81 MILLIGRAM(S): at 17:28

## 2017-01-28 RX ADMIN — Medication 3 MILLILITER(S): at 02:22

## 2017-01-28 RX ADMIN — Medication 40 MILLIGRAM(S): at 12:45

## 2017-01-28 RX ADMIN — Medication 200 MILLIGRAM(S): at 12:46

## 2017-01-28 RX ADMIN — SODIUM CHLORIDE 65 MILLILITER(S): 9 INJECTION INTRAMUSCULAR; INTRAVENOUS; SUBCUTANEOUS at 22:00

## 2017-01-28 RX ADMIN — Medication 1 TABLET(S): at 12:46

## 2017-01-28 RX ADMIN — Medication 81 MILLIGRAM(S): at 06:35

## 2017-01-28 RX ADMIN — LISINOPRIL 10 MILLIGRAM(S): 2.5 TABLET ORAL at 06:35

## 2017-01-28 RX ADMIN — Medication 3 MILLILITER(S): at 21:03

## 2017-01-28 RX ADMIN — Medication 25 MILLIGRAM(S): at 16:15

## 2017-01-28 RX ADMIN — Medication 25 MILLIGRAM(S): at 21:07

## 2017-01-28 RX ADMIN — Medication 4 MILLIGRAM(S): at 21:07

## 2017-01-28 RX ADMIN — Medication 1 TABLET(S): at 08:57

## 2017-01-28 RX ADMIN — SIMVASTATIN 20 MILLIGRAM(S): 20 TABLET, FILM COATED ORAL at 21:07

## 2017-01-28 RX ADMIN — Medication 3 MILLILITER(S): at 14:40

## 2017-01-28 RX ADMIN — HEPARIN SODIUM 5000 UNIT(S): 5000 INJECTION INTRAVENOUS; SUBCUTANEOUS at 17:29

## 2017-01-28 RX ADMIN — Medication 40 MILLIGRAM(S): at 21:07

## 2017-01-28 RX ADMIN — SODIUM CHLORIDE 75 MILLILITER(S): 9 INJECTION INTRAMUSCULAR; INTRAVENOUS; SUBCUTANEOUS at 06:38

## 2017-01-28 RX ADMIN — Medication 200 MILLIGRAM(S): at 17:28

## 2017-01-29 LAB
ANION GAP SERPL CALC-SCNC: 8 MMOL/L — SIGNIFICANT CHANGE UP (ref 5–17)
BUN SERPL-MCNC: 18 MG/DL — SIGNIFICANT CHANGE UP (ref 7–18)
CALCIUM SERPL-MCNC: 8.8 MG/DL — SIGNIFICANT CHANGE UP (ref 8.4–10.5)
CHLORIDE SERPL-SCNC: 103 MMOL/L — SIGNIFICANT CHANGE UP (ref 96–108)
CK SERPL-CCNC: 1726 U/L — HIGH (ref 35–232)
CO2 SERPL-SCNC: 27 MMOL/L — SIGNIFICANT CHANGE UP (ref 22–31)
CREAT SERPL-MCNC: 1.15 MG/DL — SIGNIFICANT CHANGE UP (ref 0.5–1.3)
GLUCOSE SERPL-MCNC: 126 MG/DL — HIGH (ref 70–99)
POTASSIUM SERPL-MCNC: 4.3 MMOL/L — SIGNIFICANT CHANGE UP (ref 3.5–5.3)
POTASSIUM SERPL-SCNC: 4.3 MMOL/L — SIGNIFICANT CHANGE UP (ref 3.5–5.3)
SODIUM SERPL-SCNC: 138 MMOL/L — SIGNIFICANT CHANGE UP (ref 135–145)

## 2017-01-29 RX ADMIN — SODIUM CHLORIDE 65 MILLILITER(S): 9 INJECTION INTRAMUSCULAR; INTRAVENOUS; SUBCUTANEOUS at 12:53

## 2017-01-29 RX ADMIN — Medication 1 TABLET(S): at 08:59

## 2017-01-29 RX ADMIN — Medication 40 MILLIGRAM(S): at 13:31

## 2017-01-29 RX ADMIN — HEPARIN SODIUM 5000 UNIT(S): 5000 INJECTION INTRAVENOUS; SUBCUTANEOUS at 06:59

## 2017-01-29 RX ADMIN — FAMOTIDINE 20 MILLIGRAM(S): 10 INJECTION INTRAVENOUS at 12:53

## 2017-01-29 RX ADMIN — Medication 40 MILLIGRAM(S): at 06:59

## 2017-01-29 RX ADMIN — Medication 1 TABLET(S): at 17:58

## 2017-01-29 RX ADMIN — SIMVASTATIN 20 MILLIGRAM(S): 20 TABLET, FILM COATED ORAL at 21:19

## 2017-01-29 RX ADMIN — Medication 3 MILLILITER(S): at 20:30

## 2017-01-29 RX ADMIN — Medication 25 MILLIGRAM(S): at 06:59

## 2017-01-29 RX ADMIN — LISINOPRIL 10 MILLIGRAM(S): 2.5 TABLET ORAL at 06:59

## 2017-01-29 RX ADMIN — Medication 3 MILLILITER(S): at 09:06

## 2017-01-29 RX ADMIN — Medication 81 MILLIGRAM(S): at 17:58

## 2017-01-29 RX ADMIN — HEPARIN SODIUM 5000 UNIT(S): 5000 INJECTION INTRAVENOUS; SUBCUTANEOUS at 17:58

## 2017-01-29 RX ADMIN — Medication 1 TABLET(S): at 12:53

## 2017-01-29 RX ADMIN — Medication 81 MILLIGRAM(S): at 06:59

## 2017-01-29 RX ADMIN — Medication 40 MILLIGRAM(S): at 21:19

## 2017-01-29 RX ADMIN — Medication 4 MILLIGRAM(S): at 21:19

## 2017-01-29 RX ADMIN — Medication 25 MILLIGRAM(S): at 21:19

## 2017-01-30 LAB
ANION GAP SERPL CALC-SCNC: 10 MMOL/L — SIGNIFICANT CHANGE UP (ref 5–17)
BUN SERPL-MCNC: 22 MG/DL — HIGH (ref 7–18)
CALCIUM SERPL-MCNC: 8.7 MG/DL — SIGNIFICANT CHANGE UP (ref 8.4–10.5)
CHLORIDE SERPL-SCNC: 104 MMOL/L — SIGNIFICANT CHANGE UP (ref 96–108)
CK SERPL-CCNC: 1569 U/L — HIGH (ref 35–232)
CO2 SERPL-SCNC: 26 MMOL/L — SIGNIFICANT CHANGE UP (ref 22–31)
CREAT SERPL-MCNC: 1.16 MG/DL — SIGNIFICANT CHANGE UP (ref 0.5–1.3)
GLUCOSE SERPL-MCNC: 127 MG/DL — HIGH (ref 70–99)
HCT VFR BLD CALC: 42.3 % — SIGNIFICANT CHANGE UP (ref 39–50)
HGB BLD-MCNC: 14 G/DL — SIGNIFICANT CHANGE UP (ref 13–17)
MAGNESIUM SERPL-MCNC: 2.5 MG/DL — HIGH (ref 1.8–2.4)
MCHC RBC-ENTMCNC: 32.1 PG — SIGNIFICANT CHANGE UP (ref 27–34)
MCHC RBC-ENTMCNC: 33 GM/DL — SIGNIFICANT CHANGE UP (ref 32–36)
MCV RBC AUTO: 97.3 FL — SIGNIFICANT CHANGE UP (ref 80–100)
PHOSPHATE SERPL-MCNC: 3.9 MG/DL — SIGNIFICANT CHANGE UP (ref 2.5–4.5)
PLATELET # BLD AUTO: 225 K/UL — SIGNIFICANT CHANGE UP (ref 150–400)
POTASSIUM SERPL-MCNC: 3.9 MMOL/L — SIGNIFICANT CHANGE UP (ref 3.5–5.3)
POTASSIUM SERPL-SCNC: 3.9 MMOL/L — SIGNIFICANT CHANGE UP (ref 3.5–5.3)
RBC # BLD: 4.35 M/UL — SIGNIFICANT CHANGE UP (ref 4.2–5.8)
RBC # FLD: 12.1 % — SIGNIFICANT CHANGE UP (ref 10.3–14.5)
SODIUM SERPL-SCNC: 140 MMOL/L — SIGNIFICANT CHANGE UP (ref 135–145)
WBC # BLD: 12.2 K/UL — HIGH (ref 3.8–10.5)
WBC # FLD AUTO: 12.2 K/UL — HIGH (ref 3.8–10.5)

## 2017-01-30 PROCEDURE — 84100 ASSAY OF PHOSPHORUS: CPT

## 2017-01-30 PROCEDURE — 93306 TTE W/DOPPLER COMPLETE: CPT

## 2017-01-30 PROCEDURE — 71045 X-RAY EXAM CHEST 1 VIEW: CPT

## 2017-01-30 PROCEDURE — 83036 HEMOGLOBIN GLYCOSYLATED A1C: CPT

## 2017-01-30 PROCEDURE — 82306 VITAMIN D 25 HYDROXY: CPT

## 2017-01-30 PROCEDURE — 87798 DETECT AGENT NOS DNA AMP: CPT

## 2017-01-30 PROCEDURE — 82553 CREATINE MB FRACTION: CPT

## 2017-01-30 PROCEDURE — 80053 COMPREHEN METABOLIC PANEL: CPT

## 2017-01-30 PROCEDURE — 85027 COMPLETE CBC AUTOMATED: CPT

## 2017-01-30 PROCEDURE — 96375 TX/PRO/DX INJ NEW DRUG ADDON: CPT

## 2017-01-30 PROCEDURE — 84300 ASSAY OF URINE SODIUM: CPT

## 2017-01-30 PROCEDURE — 83735 ASSAY OF MAGNESIUM: CPT

## 2017-01-30 PROCEDURE — 93005 ELECTROCARDIOGRAM TRACING: CPT

## 2017-01-30 PROCEDURE — 81001 URINALYSIS AUTO W/SCOPE: CPT

## 2017-01-30 PROCEDURE — 70450 CT HEAD/BRAIN W/O DYE: CPT

## 2017-01-30 PROCEDURE — 87086 URINE CULTURE/COLONY COUNT: CPT

## 2017-01-30 PROCEDURE — 80061 LIPID PANEL: CPT

## 2017-01-30 PROCEDURE — 83880 ASSAY OF NATRIURETIC PEPTIDE: CPT

## 2017-01-30 PROCEDURE — 99285 EMERGENCY DEPT VISIT HI MDM: CPT | Mod: 25

## 2017-01-30 PROCEDURE — 87040 BLOOD CULTURE FOR BACTERIA: CPT

## 2017-01-30 PROCEDURE — 94640 AIRWAY INHALATION TREATMENT: CPT

## 2017-01-30 PROCEDURE — 96374 THER/PROPH/DIAG INJ IV PUSH: CPT

## 2017-01-30 PROCEDURE — 82570 ASSAY OF URINE CREATININE: CPT

## 2017-01-30 PROCEDURE — 80076 HEPATIC FUNCTION PANEL: CPT

## 2017-01-30 PROCEDURE — 83935 ASSAY OF URINE OSMOLALITY: CPT

## 2017-01-30 PROCEDURE — 73502 X-RAY EXAM HIP UNI 2-3 VIEWS: CPT

## 2017-01-30 PROCEDURE — 82550 ASSAY OF CK (CPK): CPT

## 2017-01-30 PROCEDURE — 87486 CHLMYD PNEUM DNA AMP PROBE: CPT

## 2017-01-30 PROCEDURE — 87633 RESP VIRUS 12-25 TARGETS: CPT

## 2017-01-30 PROCEDURE — 87581 M.PNEUMON DNA AMP PROBE: CPT

## 2017-01-30 PROCEDURE — 97161 PT EVAL LOW COMPLEX 20 MIN: CPT

## 2017-01-30 PROCEDURE — 85610 PROTHROMBIN TIME: CPT

## 2017-01-30 PROCEDURE — 84156 ASSAY OF PROTEIN URINE: CPT

## 2017-01-30 PROCEDURE — 84443 ASSAY THYROID STIM HORMONE: CPT

## 2017-01-30 PROCEDURE — 80048 BASIC METABOLIC PNL TOTAL CA: CPT

## 2017-01-30 PROCEDURE — 84484 ASSAY OF TROPONIN QUANT: CPT

## 2017-01-30 PROCEDURE — 85730 THROMBOPLASTIN TIME PARTIAL: CPT

## 2017-01-30 PROCEDURE — 82607 VITAMIN B-12: CPT

## 2017-01-30 RX ORDER — ALBUTEROL 90 UG/1
2 AEROSOL, METERED ORAL
Qty: 1 | Refills: 0 | OUTPATIENT
Start: 2017-01-30 | End: 2017-02-09

## 2017-01-30 RX ORDER — METOPROLOL TARTRATE 50 MG
1 TABLET ORAL
Qty: 90 | Refills: 0 | OUTPATIENT
Start: 2017-01-30 | End: 2017-03-01

## 2017-01-30 RX ORDER — DOXAZOSIN MESYLATE 4 MG
1 TABLET ORAL
Qty: 30 | Refills: 0 | OUTPATIENT
Start: 2017-01-30 | End: 2017-03-01

## 2017-01-30 RX ORDER — TERAZOSIN HYDROCHLORIDE 10 MG/1
5 CAPSULE ORAL
Qty: 0 | Refills: 0 | COMMUNITY

## 2017-01-30 RX ORDER — METOPROLOL TARTRATE 50 MG
0.5 TABLET ORAL
Qty: 0 | Refills: 0 | COMMUNITY

## 2017-01-30 RX ADMIN — LISINOPRIL 10 MILLIGRAM(S): 2.5 TABLET ORAL at 06:39

## 2017-01-30 RX ADMIN — Medication 1 TABLET(S): at 13:08

## 2017-01-30 RX ADMIN — Medication 1 TABLET(S): at 18:09

## 2017-01-30 RX ADMIN — Medication 81 MILLIGRAM(S): at 06:39

## 2017-01-30 RX ADMIN — Medication 25 MILLIGRAM(S): at 06:38

## 2017-01-30 RX ADMIN — Medication 3 MILLILITER(S): at 09:43

## 2017-01-30 RX ADMIN — HEPARIN SODIUM 5000 UNIT(S): 5000 INJECTION INTRAVENOUS; SUBCUTANEOUS at 06:39

## 2017-01-30 RX ADMIN — Medication 40 MILLIGRAM(S): at 18:09

## 2017-01-30 RX ADMIN — Medication 3 MILLILITER(S): at 03:22

## 2017-01-30 RX ADMIN — FAMOTIDINE 20 MILLIGRAM(S): 10 INJECTION INTRAVENOUS at 13:10

## 2017-01-30 RX ADMIN — Medication 81 MILLIGRAM(S): at 18:10

## 2017-01-30 RX ADMIN — Medication 25 MILLIGRAM(S): at 13:10

## 2017-01-30 RX ADMIN — Medication 40 MILLIGRAM(S): at 06:38

## 2017-01-31 VITALS
RESPIRATION RATE: 16 BRPM | SYSTOLIC BLOOD PRESSURE: 114 MMHG | OXYGEN SATURATION: 93 % | TEMPERATURE: 98 F | DIASTOLIC BLOOD PRESSURE: 43 MMHG | HEART RATE: 76 BPM

## 2017-01-31 LAB
CULTURE RESULTS: SIGNIFICANT CHANGE UP
CULTURE RESULTS: SIGNIFICANT CHANGE UP
SPECIMEN SOURCE: SIGNIFICANT CHANGE UP
SPECIMEN SOURCE: SIGNIFICANT CHANGE UP

## 2017-02-02 DIAGNOSIS — G81.94 HEMIPLEGIA, UNSPECIFIED AFFECTING LEFT NONDOMINANT SIDE: ICD-10-CM

## 2017-02-02 DIAGNOSIS — Z87.891 PERSONAL HISTORY OF NICOTINE DEPENDENCE: ICD-10-CM

## 2017-02-02 DIAGNOSIS — R31.0 GROSS HEMATURIA: ICD-10-CM

## 2017-02-02 DIAGNOSIS — J06.9 ACUTE UPPER RESPIRATORY INFECTION, UNSPECIFIED: ICD-10-CM

## 2017-02-02 DIAGNOSIS — E78.5 HYPERLIPIDEMIA, UNSPECIFIED: ICD-10-CM

## 2017-02-02 DIAGNOSIS — I25.10 ATHEROSCLEROTIC HEART DISEASE OF NATIVE CORONARY ARTERY WITHOUT ANGINA PECTORIS: ICD-10-CM
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
## 2017-02-02 DIAGNOSIS — Z79.82 LONG TERM (CURRENT) USE OF ASPIRIN: ICD-10-CM

## 2017-02-02 DIAGNOSIS — Z51.89 ENCOUNTER FOR OTHER SPECIFIED AFTERCARE: ICD-10-CM

## 2017-02-02 DIAGNOSIS — R74.8 ABNORMAL LEVELS OF OTHER SERUM ENZYMES: ICD-10-CM

## 2017-02-02 DIAGNOSIS — B97.4 RESPIRATORY SYNCYTIAL VIRUS AS THE CAUSE OF DISEASES CLASSIFIED ELSEWHERE: ICD-10-CM

## 2017-02-02 DIAGNOSIS — N17.9 ACUTE KIDNEY FAILURE, UNSPECIFIED: ICD-10-CM

## 2017-02-02 DIAGNOSIS — I10 ESSENTIAL (PRIMARY) HYPERTENSION: ICD-10-CM

## 2017-02-02 DIAGNOSIS — Z95.5 PRESENCE OF CORONARY ANGIOPLASTY IMPLANT AND GRAFT: ICD-10-CM

## 2017-02-02 DIAGNOSIS — T79.6XXA TRAUMATIC ISCHEMIA OF MUSCLE, INITIAL ENCOUNTER: ICD-10-CM

## 2017-02-02 DIAGNOSIS — N40.0 BENIGN PROSTATIC HYPERPLASIA WITHOUT LOWER URINARY TRACT SYMPTOMS: ICD-10-CM

## 2018-11-23 NOTE — ED ADULT TRIAGE NOTE - MODE OF ARRIVAL
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 It is very difficult for him to walk 1 mile both ways. He tires easily and has lower tone than his peers. He also receives PT through an outside agency. He is practicing walking distances, although he can not accomplish this distance at the present time. Thank you for your time and consideration in this matter.    Yasmin Jenkins MD      Signatures   Electronically signed by : Tomasa Matthews R.N.; Aug 24 2017 11:41AM CST    
EMS

## 2019-04-22 ENCOUNTER — INPATIENT (INPATIENT)
Facility: HOSPITAL | Age: 84
LOS: 4 days | Discharge: HOME | DRG: 558 | End: 2019-04-27
Attending: INTERNAL MEDICINE | Admitting: INTERNAL MEDICINE
Payer: MEDICARE

## 2019-04-22 VITALS
HEART RATE: 99 BPM | RESPIRATION RATE: 16 BRPM | HEIGHT: 67 IN | WEIGHT: 175.05 LBS | SYSTOLIC BLOOD PRESSURE: 142 MMHG | OXYGEN SATURATION: 94 % | TEMPERATURE: 100 F | DIASTOLIC BLOOD PRESSURE: 89 MMHG

## 2019-04-22 RX ORDER — IPRATROPIUM/ALBUTEROL SULFATE 18-103MCG
3 AEROSOL WITH ADAPTER (GRAM) INHALATION ONCE
Qty: 0 | Refills: 0 | Status: COMPLETED | OUTPATIENT
Start: 2019-04-22 | End: 2019-04-22

## 2019-04-22 NOTE — ED ADULT TRIAGE NOTE - CHIEF COMPLAINT QUOTE
He was found on the floor.  He bend over to retrieve object from floor, tipped over and fell, unable to get up.  [facility] was [facility] from [facility] for evaluation of dizziness, fall and generalized weakness

## 2019-04-23 ENCOUNTER — TRANSCRIPTION ENCOUNTER (OUTPATIENT)
Age: 84
End: 2019-04-23

## 2019-04-23 DIAGNOSIS — I21.4 NON-ST ELEVATION (NSTEMI) MYOCARDIAL INFARCTION: ICD-10-CM

## 2019-04-23 DIAGNOSIS — J44.9 CHRONIC OBSTRUCTIVE PULMONARY DISEASE, UNSPECIFIED: ICD-10-CM

## 2019-04-23 DIAGNOSIS — E78.00 PURE HYPERCHOLESTEROLEMIA, UNSPECIFIED: ICD-10-CM

## 2019-04-23 DIAGNOSIS — R53.1 WEAKNESS: ICD-10-CM

## 2019-04-23 DIAGNOSIS — J18.9 PNEUMONIA, UNSPECIFIED ORGANISM: ICD-10-CM

## 2019-04-23 DIAGNOSIS — Z29.9 ENCOUNTER FOR PROPHYLACTIC MEASURES, UNSPECIFIED: ICD-10-CM

## 2019-04-23 DIAGNOSIS — Z86.79 PERSONAL HISTORY OF OTHER DISEASES OF THE CIRCULATORY SYSTEM: ICD-10-CM

## 2019-04-23 DIAGNOSIS — N40.0 BENIGN PROSTATIC HYPERPLASIA WITHOUT LOWER URINARY TRACT SYMPTOMS: ICD-10-CM

## 2019-04-23 DIAGNOSIS — R55 SYNCOPE AND COLLAPSE: ICD-10-CM

## 2019-04-23 LAB
ALBUMIN SERPL ELPH-MCNC: 3.3 G/DL — LOW (ref 3.5–5)
ALBUMIN SERPL ELPH-MCNC: 3.6 G/DL — SIGNIFICANT CHANGE UP (ref 3.5–5)
ALP SERPL-CCNC: 72 U/L — SIGNIFICANT CHANGE UP (ref 40–120)
ALP SERPL-CCNC: 80 U/L — SIGNIFICANT CHANGE UP (ref 40–120)
ALT FLD-CCNC: 27 U/L DA — SIGNIFICANT CHANGE UP (ref 10–60)
ALT FLD-CCNC: 28 U/L DA — SIGNIFICANT CHANGE UP (ref 10–60)
ANION GAP SERPL CALC-SCNC: 5 MMOL/L — SIGNIFICANT CHANGE UP (ref 5–17)
ANION GAP SERPL CALC-SCNC: 7 MMOL/L — SIGNIFICANT CHANGE UP (ref 5–17)
AST SERPL-CCNC: 32 U/L — SIGNIFICANT CHANGE UP (ref 10–40)
AST SERPL-CCNC: 41 U/L — HIGH (ref 10–40)
BASOPHILS # BLD AUTO: 0.04 K/UL — SIGNIFICANT CHANGE UP (ref 0–0.2)
BASOPHILS # BLD AUTO: 0.06 K/UL — SIGNIFICANT CHANGE UP (ref 0–0.2)
BASOPHILS NFR BLD AUTO: 0.5 % — SIGNIFICANT CHANGE UP (ref 0–2)
BASOPHILS NFR BLD AUTO: 0.8 % — SIGNIFICANT CHANGE UP (ref 0–2)
BILIRUB SERPL-MCNC: 0.9 MG/DL — SIGNIFICANT CHANGE UP (ref 0.2–1.2)
BILIRUB SERPL-MCNC: 0.9 MG/DL — SIGNIFICANT CHANGE UP (ref 0.2–1.2)
BUN SERPL-MCNC: 17 MG/DL — SIGNIFICANT CHANGE UP (ref 7–18)
BUN SERPL-MCNC: 19 MG/DL — HIGH (ref 7–18)
CALCIUM SERPL-MCNC: 8.9 MG/DL — SIGNIFICANT CHANGE UP (ref 8.4–10.5)
CALCIUM SERPL-MCNC: 9.2 MG/DL — SIGNIFICANT CHANGE UP (ref 8.4–10.5)
CHLORIDE SERPL-SCNC: 102 MMOL/L — SIGNIFICANT CHANGE UP (ref 96–108)
CHLORIDE SERPL-SCNC: 103 MMOL/L — SIGNIFICANT CHANGE UP (ref 96–108)
CHOLEST SERPL-MCNC: 121 MG/DL — SIGNIFICANT CHANGE UP (ref 10–199)
CK MB BLD-MCNC: 0.1 % — SIGNIFICANT CHANGE UP (ref 0–3.5)
CK MB CFR SERPL CALC: 2 NG/ML — SIGNIFICANT CHANGE UP (ref 0–3.6)
CK SERPL-CCNC: 1839 U/L — HIGH (ref 35–232)
CO2 SERPL-SCNC: 27 MMOL/L — SIGNIFICANT CHANGE UP (ref 22–31)
CO2 SERPL-SCNC: 28 MMOL/L — SIGNIFICANT CHANGE UP (ref 22–31)
CREAT SERPL-MCNC: 1.28 MG/DL — SIGNIFICANT CHANGE UP (ref 0.5–1.3)
CREAT SERPL-MCNC: 1.29 MG/DL — SIGNIFICANT CHANGE UP (ref 0.5–1.3)
EOSINOPHIL # BLD AUTO: 0.02 K/UL — SIGNIFICANT CHANGE UP (ref 0–0.5)
EOSINOPHIL # BLD AUTO: 0.03 K/UL — SIGNIFICANT CHANGE UP (ref 0–0.5)
EOSINOPHIL NFR BLD AUTO: 0.3 % — SIGNIFICANT CHANGE UP (ref 0–6)
EOSINOPHIL NFR BLD AUTO: 0.4 % — SIGNIFICANT CHANGE UP (ref 0–6)
FLU A RESULT: SIGNIFICANT CHANGE UP
FLU A RESULT: SIGNIFICANT CHANGE UP
FLUAV AG NPH QL: SIGNIFICANT CHANGE UP
FLUBV AG NPH QL: SIGNIFICANT CHANGE UP
FOLATE SERPL-MCNC: >20 NG/ML — SIGNIFICANT CHANGE UP
GLUCOSE SERPL-MCNC: 109 MG/DL — HIGH (ref 70–99)
GLUCOSE SERPL-MCNC: 89 MG/DL — SIGNIFICANT CHANGE UP (ref 70–99)
HBA1C BLD-MCNC: 5.6 % — SIGNIFICANT CHANGE UP (ref 4–5.6)
HCT VFR BLD CALC: 43 % — SIGNIFICANT CHANGE UP (ref 39–50)
HCT VFR BLD CALC: 44.5 % — SIGNIFICANT CHANGE UP (ref 39–50)
HDLC SERPL-MCNC: 71 MG/DL — SIGNIFICANT CHANGE UP
HGB BLD-MCNC: 14.6 G/DL — SIGNIFICANT CHANGE UP (ref 13–17)
HGB BLD-MCNC: 15.1 G/DL — SIGNIFICANT CHANGE UP (ref 13–17)
IMM GRANULOCYTES NFR BLD AUTO: 0.3 % — SIGNIFICANT CHANGE UP (ref 0–1.5)
IMM GRANULOCYTES NFR BLD AUTO: 0.5 % — SIGNIFICANT CHANGE UP (ref 0–1.5)
LIPID PNL WITH DIRECT LDL SERPL: 42 MG/DL — SIGNIFICANT CHANGE UP
LYMPHOCYTES # BLD AUTO: 0.73 K/UL — LOW (ref 1–3.3)
LYMPHOCYTES # BLD AUTO: 1.01 K/UL — SIGNIFICANT CHANGE UP (ref 1–3.3)
LYMPHOCYTES # BLD AUTO: 13.6 % — SIGNIFICANT CHANGE UP (ref 13–44)
LYMPHOCYTES # BLD AUTO: 9.2 % — LOW (ref 13–44)
MAGNESIUM SERPL-MCNC: 2 MG/DL — SIGNIFICANT CHANGE UP (ref 1.6–2.6)
MCHC RBC-ENTMCNC: 32 PG — SIGNIFICANT CHANGE UP (ref 27–34)
MCHC RBC-ENTMCNC: 32.2 PG — SIGNIFICANT CHANGE UP (ref 27–34)
MCHC RBC-ENTMCNC: 33.9 GM/DL — SIGNIFICANT CHANGE UP (ref 32–36)
MCHC RBC-ENTMCNC: 34 GM/DL — SIGNIFICANT CHANGE UP (ref 32–36)
MCV RBC AUTO: 94.3 FL — SIGNIFICANT CHANGE UP (ref 80–100)
MCV RBC AUTO: 94.9 FL — SIGNIFICANT CHANGE UP (ref 80–100)
MONOCYTES # BLD AUTO: 0.94 K/UL — HIGH (ref 0–0.9)
MONOCYTES # BLD AUTO: 1.08 K/UL — HIGH (ref 0–0.9)
MONOCYTES NFR BLD AUTO: 11.9 % — SIGNIFICANT CHANGE UP (ref 2–14)
MONOCYTES NFR BLD AUTO: 14.6 % — HIGH (ref 2–14)
NEUTROPHILS # BLD AUTO: 5.2 K/UL — SIGNIFICANT CHANGE UP (ref 1.8–7.4)
NEUTROPHILS # BLD AUTO: 6.13 K/UL — SIGNIFICANT CHANGE UP (ref 1.8–7.4)
NEUTROPHILS NFR BLD AUTO: 70.3 % — SIGNIFICANT CHANGE UP (ref 43–77)
NEUTROPHILS NFR BLD AUTO: 77.6 % — HIGH (ref 43–77)
NRBC # BLD: 0 /100 WBCS — SIGNIFICANT CHANGE UP (ref 0–0)
NRBC # BLD: 0 /100 WBCS — SIGNIFICANT CHANGE UP (ref 0–0)
PHOSPHATE SERPL-MCNC: 2.9 MG/DL — SIGNIFICANT CHANGE UP (ref 2.5–4.5)
PLATELET # BLD AUTO: 202 K/UL — SIGNIFICANT CHANGE UP (ref 150–400)
PLATELET # BLD AUTO: 206 K/UL — SIGNIFICANT CHANGE UP (ref 150–400)
POTASSIUM SERPL-MCNC: 4.1 MMOL/L — SIGNIFICANT CHANGE UP (ref 3.5–5.3)
POTASSIUM SERPL-MCNC: 4.3 MMOL/L — SIGNIFICANT CHANGE UP (ref 3.5–5.3)
POTASSIUM SERPL-SCNC: 4.1 MMOL/L — SIGNIFICANT CHANGE UP (ref 3.5–5.3)
POTASSIUM SERPL-SCNC: 4.3 MMOL/L — SIGNIFICANT CHANGE UP (ref 3.5–5.3)
PROCALCITONIN SERPL-MCNC: 0.11 NG/ML — HIGH (ref 0.02–0.1)
PROT SERPL-MCNC: 6.8 G/DL — SIGNIFICANT CHANGE UP (ref 6–8.3)
PROT SERPL-MCNC: 7.3 G/DL — SIGNIFICANT CHANGE UP (ref 6–8.3)
RBC # BLD: 4.53 M/UL — SIGNIFICANT CHANGE UP (ref 4.2–5.8)
RBC # BLD: 4.72 M/UL — SIGNIFICANT CHANGE UP (ref 4.2–5.8)
RBC # FLD: 14.2 % — SIGNIFICANT CHANGE UP (ref 10.3–14.5)
RBC # FLD: 14.3 % — SIGNIFICANT CHANGE UP (ref 10.3–14.5)
RSV RESULT: SIGNIFICANT CHANGE UP
RSV RNA RESP QL NAA+PROBE: SIGNIFICANT CHANGE UP
SODIUM SERPL-SCNC: 135 MMOL/L — SIGNIFICANT CHANGE UP (ref 135–145)
SODIUM SERPL-SCNC: 137 MMOL/L — SIGNIFICANT CHANGE UP (ref 135–145)
T4 AB SER-ACNC: 7 UG/DL — SIGNIFICANT CHANGE UP (ref 4.6–12)
TOTAL CHOLESTEROL/HDL RATIO MEASUREMENT: 1.7 RATIO — LOW (ref 3.4–9.6)
TRIGL SERPL-MCNC: 39 MG/DL — SIGNIFICANT CHANGE UP (ref 10–149)
TROPONIN I SERPL-MCNC: 0.27 NG/ML — HIGH (ref 0–0.04)
TROPONIN I SERPL-MCNC: 0.48 NG/ML — HIGH (ref 0–0.04)
TSH SERPL-MCNC: 0.56 UU/ML — SIGNIFICANT CHANGE UP (ref 0.34–4.82)
VIT B12 SERPL-MCNC: 509 PG/ML — SIGNIFICANT CHANGE UP (ref 232–1245)
WBC # BLD: 7.4 K/UL — SIGNIFICANT CHANGE UP (ref 3.8–10.5)
WBC # BLD: 7.9 K/UL — SIGNIFICANT CHANGE UP (ref 3.8–10.5)
WBC # FLD AUTO: 7.4 K/UL — SIGNIFICANT CHANGE UP (ref 3.8–10.5)
WBC # FLD AUTO: 7.9 K/UL — SIGNIFICANT CHANGE UP (ref 3.8–10.5)

## 2019-04-23 PROCEDURE — 71045 X-RAY EXAM CHEST 1 VIEW: CPT | Mod: 26

## 2019-04-23 PROCEDURE — 99285 EMERGENCY DEPT VISIT HI MDM: CPT | Mod: 25

## 2019-04-23 PROCEDURE — 73080 X-RAY EXAM OF ELBOW: CPT | Mod: 26,RT

## 2019-04-23 RX ORDER — SODIUM CHLORIDE 9 MG/ML
1000 INJECTION INTRAMUSCULAR; INTRAVENOUS; SUBCUTANEOUS
Qty: 0 | Refills: 0 | Status: DISCONTINUED | OUTPATIENT
Start: 2019-04-23 | End: 2019-04-24

## 2019-04-23 RX ORDER — SIMVASTATIN 20 MG/1
10 TABLET, FILM COATED ORAL AT BEDTIME
Qty: 0 | Refills: 0 | Status: DISCONTINUED | OUTPATIENT
Start: 2019-04-23 | End: 2019-04-23

## 2019-04-23 RX ORDER — DOXAZOSIN MESYLATE 4 MG
4 TABLET ORAL AT BEDTIME
Qty: 0 | Refills: 0 | Status: DISCONTINUED | OUTPATIENT
Start: 2019-04-23 | End: 2019-04-27

## 2019-04-23 RX ORDER — CEFEPIME 1 G/1
INJECTION, POWDER, FOR SOLUTION INTRAMUSCULAR; INTRAVENOUS
Qty: 0 | Refills: 0 | Status: DISCONTINUED | OUTPATIENT
Start: 2019-04-23 | End: 2019-04-24

## 2019-04-23 RX ORDER — CEFEPIME 1 G/1
1000 INJECTION, POWDER, FOR SOLUTION INTRAMUSCULAR; INTRAVENOUS ONCE
Qty: 0 | Refills: 0 | Status: COMPLETED | OUTPATIENT
Start: 2019-04-23 | End: 2019-04-23

## 2019-04-23 RX ORDER — ASPIRIN/CALCIUM CARB/MAGNESIUM 324 MG
81 TABLET ORAL DAILY
Qty: 0 | Refills: 0 | Status: DISCONTINUED | OUTPATIENT
Start: 2019-04-23 | End: 2019-04-27

## 2019-04-23 RX ORDER — ASPIRIN/CALCIUM CARB/MAGNESIUM 324 MG
162 TABLET ORAL ONCE
Qty: 0 | Refills: 0 | Status: COMPLETED | OUTPATIENT
Start: 2019-04-23 | End: 2019-04-23

## 2019-04-23 RX ORDER — LISINOPRIL 2.5 MG/1
10 TABLET ORAL DAILY
Qty: 0 | Refills: 0 | Status: DISCONTINUED | OUTPATIENT
Start: 2019-04-23 | End: 2019-04-27

## 2019-04-23 RX ORDER — ENOXAPARIN SODIUM 100 MG/ML
40 INJECTION SUBCUTANEOUS DAILY
Qty: 0 | Refills: 0 | Status: DISCONTINUED | OUTPATIENT
Start: 2019-04-23 | End: 2019-04-27

## 2019-04-23 RX ORDER — METOPROLOL TARTRATE 50 MG
25 TABLET ORAL EVERY 12 HOURS
Qty: 0 | Refills: 0 | Status: DISCONTINUED | OUTPATIENT
Start: 2019-04-23 | End: 2019-04-27

## 2019-04-23 RX ORDER — ALBUTEROL 90 UG/1
2 AEROSOL, METERED ORAL EVERY 6 HOURS
Qty: 0 | Refills: 0 | Status: DISCONTINUED | OUTPATIENT
Start: 2019-04-23 | End: 2019-04-27

## 2019-04-23 RX ORDER — CEFEPIME 1 G/1
1000 INJECTION, POWDER, FOR SOLUTION INTRAMUSCULAR; INTRAVENOUS EVERY 24 HOURS
Qty: 0 | Refills: 0 | Status: DISCONTINUED | OUTPATIENT
Start: 2019-04-24 | End: 2019-04-24

## 2019-04-23 RX ORDER — FUROSEMIDE 40 MG
40 TABLET ORAL DAILY
Qty: 0 | Refills: 0 | Status: DISCONTINUED | OUTPATIENT
Start: 2019-04-23 | End: 2019-04-23

## 2019-04-23 RX ADMIN — Medication 25 MILLIGRAM(S): at 05:43

## 2019-04-23 RX ADMIN — ENOXAPARIN SODIUM 40 MILLIGRAM(S): 100 INJECTION SUBCUTANEOUS at 11:13

## 2019-04-23 RX ADMIN — Medication 40 MILLIGRAM(S): at 05:43

## 2019-04-23 RX ADMIN — Medication 100 MILLIGRAM(S): at 23:13

## 2019-04-23 RX ADMIN — LISINOPRIL 10 MILLIGRAM(S): 2.5 TABLET ORAL at 05:43

## 2019-04-23 RX ADMIN — Medication 3 MILLILITER(S): at 00:10

## 2019-04-23 RX ADMIN — Medication 81 MILLIGRAM(S): at 11:13

## 2019-04-23 RX ADMIN — SODIUM CHLORIDE 70 MILLILITER(S): 9 INJECTION INTRAMUSCULAR; INTRAVENOUS; SUBCUTANEOUS at 11:13

## 2019-04-23 RX ADMIN — ALBUTEROL 2 PUFF(S): 90 AEROSOL, METERED ORAL at 11:12

## 2019-04-23 RX ADMIN — Medication 162 MILLIGRAM(S): at 02:40

## 2019-04-23 RX ADMIN — Medication 100 MILLIGRAM(S): at 11:13

## 2019-04-23 RX ADMIN — CEFEPIME 100 MILLIGRAM(S): 1 INJECTION, POWDER, FOR SOLUTION INTRAMUSCULAR; INTRAVENOUS at 05:43

## 2019-04-23 RX ADMIN — Medication 100 MILLIGRAM(S): at 18:03

## 2019-04-23 NOTE — PHYSICAL THERAPY INITIAL EVALUATION ADULT - LEVEL OF INDEPENDENCE: GAIT, REHAB EVAL
occasional Min Assit during turns secondary to Pt not staying inside walker.  Progressed to CG with turns with good carryover for staying in walker/contact guard

## 2019-04-23 NOTE — ED PROVIDER NOTE - OBJECTIVE STATEMENT
89 y/o male with PMHx of HTN, HLD, and BPH presents to the ED with c/o fall today. Pt states he has been feeling generally week for the past 1-2 days with some congestion and felt this way when he collapsed. Pt was unable to get back up on his own and required EMS. Pt denies any chest pain, SOB, head trauma, LOC, pain, or other complaints. Pt currently takes 162mg ASA.

## 2019-04-23 NOTE — H&P ADULT - HISTORY OF PRESENT ILLNESS
89 Male with PMH of HTN, COPD, BPH, Macular degeneration, Latent TB came to hospital for generalized weakness and unable to walk properly for 2 days. He was doing his laundry yesterday when his legs gave up and he sat down. Was not able to get up. Called for assistance. Denies fall, syncope, trauma to head. Pt has also noticed subjective fevers last night and has been coughing with whitish sputum. No sick contacts. No chest pain, focal weakness, shortness of breath, abdominal pain, changes in urine or bowel.    SH: Uses walker, lives in [facility]. No smoking or alcohol.    ED Course: hemodynamically stable. Labs showed elevated cardiac enzymes of 0.26. EKG showed old widened QRS complexes. CXR showed RLL consolidation. Pt was given aspirin and duoneb. 89 Male with PMH of HTN, COPD, BPH, Macular degeneration, Latent TB came to hospital for generalized weakness and unable to walk properly for 2 days. He was doing his laundry yesterday when his legs gave up and he sat down. Was not able to get up. Called for assistance. Denies fall, syncope, trauma to head. Pt has also noticed subjective fevers last night and has been coughing with whitish sputum. No sick contacts. No chest pain, focal weakness, shortness of breath, abdominal pain, changes in urine or bowel.    SH: Uses walker, lives in [facility]. No smoking or alcohol.    ED Course: hemodynamically stable. Labs showed elevated cardiac enzymes of 0.26. EKG showed old widened QRS complexes. CXR showed RLL consolidation. Pt was given aspirin and duoneb.

## 2019-04-23 NOTE — DISCHARGE NOTE PROVIDER - HOSPITAL COURSE
89 Male with PMH of HTN, COPD, BPH, Macular degeneration, Latent TB came to hospital for generalized weakness and unable to walk properly for 2 days. He was doing his laundry yesterday when his legs gave up and he sat down. Was not able to get up. Called for assistance. Denies fall, syncope, trauma to head. Pt has also noticed subjective fevers last night and has been coughing with whitish sputum. No sick contacts. No chest pain, focal weakness, shortness of breath, abdominal pain, changes in urine or bowel.    SH: Uses walker, lives in [facility] senior living. No smoking or alcohol.    ED Course: hemodynamically stable. Labs showed elevated cardiac enzymes of 0.26. EKG showed old widened QRS complexes.     -CXR with no evidence of PNA, seen by ID, afebrile, no leukocytosis     -mildly elevated cardiac enzymes likely sec to demand, EKG with old widened QRS complexes, denies CP, s/p Tele monitoring, seen by cardio Dr. Richardson, no interventions recommended 89 Male with PMH of HTN, COPD, BPH, Macular degeneration, Latent TB came to hospital for generalized weakness and unable to walk properly for 2 days. He was doing his laundry yesterday when his legs gave up and he sat down. Was not able to get up. Called for assistance. Denies fall, syncope, trauma to head. Pt has also noticed subjective fevers last night and has been coughing with whitish sputum. No sick contacts. No chest pain, focal weakness, shortness of breath, abdominal pain, changes in urine or bowel. PNA was ruled out however patient ad some wheezing  for which he was given prednisone 40mg for 4 days and zithromax for 5 days. Patient is otherwise doing well. will be discharged to [facility]. d/w attending. stable for discharge 89 Male with PMH of HTN, COPD, BPH, Macular degeneration, Latent TB came to hospital for generalized weakness and unable to walk properly for 2 days. He was doing his laundry yesterday when his legs gave up and he sat down. Was not able to get up. Called for assistance. Denies fall, syncope, trauma to head. Pt has also noticed subjective fevers last night and has been coughing with whitish sputum. No sick contacts. No chest pain, focal weakness, shortness of breath, abdominal pain, changes in urine or bowel. PNA was ruled out however patient ad some wheezing  for which he was given prednisone 40mg for 4 days and zithromax for 5 days. Patient is otherwise doing well. will be discharged to Assisted living facility with PT. d/w attending. stable for discharge

## 2019-04-23 NOTE — H&P ADULT - PROBLEM SELECTOR PLAN 2
Productive cough and subjective fevers  CXR: RLL infiltrate  Started Cefepime   F/u legionella, Strep pna and Blood Cx Productive cough and subjective fevers  CXR: RLL infiltrate  Started Cefepime   F/u legionella, Strep pna and Blood Cx  Consulted Dr Richardson

## 2019-04-23 NOTE — DISCHARGE NOTE PROVIDER - CARE PROVIDER_API CALL
Quan Valdivia)  Internal Medicine  [address] [place] [address] [address] [place] [address]  Phone: [phone]  Fax: [phone]  Follow Up Time: 1 week

## 2019-04-23 NOTE — PHYSICAL THERAPY INITIAL EVALUATION ADULT - GENERAL OBSERVATIONS, REHAB EVAL
Consult received, chart reviewed. Patient received supine in bed, NAD, + tele + family at bedside. Patient agreed to EVALUATION from Physical Therapist.

## 2019-04-23 NOTE — ED ADULT NURSE NOTE - OBJECTIVE STATEMENT
The patient presents for evaluation s/p fall.  He is aaox3 ambulates with assistance.  He denies pain, dizziness and chest pain.

## 2019-04-23 NOTE — PHYSICAL THERAPY INITIAL EVALUATION ADULT - GAIT DEVIATIONS NOTED, PT EVAL
decreased velocity of limb motion/decreased stride length/decreased weight-shifting ability/decreased sallie/decreased step length

## 2019-04-23 NOTE — ED PROVIDER NOTE - AGGRAVATING FACTORS
none
Airway patent, Nasal mucosa clear. Mouth with normal mucosa. Throat has no vesicles, no oropharyngeal exudates and uvula is midline.

## 2019-04-23 NOTE — CONSULT NOTE ADULT - SUBJECTIVE AND OBJECTIVE BOX
PULMONARY CONSULT NOTE      PAWAN STALEY  MRN-[id number]    Patient is a 89y old  Male who presents with a chief complaint of Weakness (23 Apr 2019 09:32)  No fever chills cough, N/v/D  Hx chart lab and xrays reviewd      HISTORY OF PRESENT ILLNESS:    Home Medications:  aspirin 81 mg oral tablet: 1 tab(s) orally 2 times a day (23 Apr 2019 04:15)  fosinopril 10 mg oral tablet: 1 tab(s) orally once a day (23 Apr 2019 04:15)  Lasix 40 mg oral tablet: 1 tab(s) orally once a day (23 Apr 2019 04:15)  lutein 20 mg oral tablet: 1 tab(s) orally once a day (23 Apr 2019 04:15)  simvastatin 10 mg oral tablet: 1 tab(s) orally once a day (at bedtime) (23 Apr 2019 04:15)  terazosin 5 mg oral capsule: 1 cap(s) orally once a day (at bedtime) (23 Apr 2019 04:15)      MEDICATIONS  (STANDING):  aspirin enteric coated 81 milliGRAM(s) Oral daily  cefepime   IVPB      doxazosin 4 milliGRAM(s) Oral at bedtime  enoxaparin Injectable 40 milliGRAM(s) SubCutaneous daily  furosemide    Tablet 40 milliGRAM(s) Oral daily  lisinopril 10 milliGRAM(s) Oral daily  metoprolol tartrate 25 milliGRAM(s) Oral every 12 hours  simvastatin 10 milliGRAM(s) Oral at bedtime      MEDICATIONS  (PRN):  ALBUTerol    90 MICROgram(s) HFA Inhaler 2 Puff(s) Inhalation every 6 hours PRN Bronchospasm      Allergies    No Known Allergies            PAST MEDICAL & SURGICAL HISTORY:  BPH (benign prostatic hyperplasia)  Hypercholesterolemia  History of hypertension  No significant past surgical history      FAMILY HISTORY:  HTN+    DM--   IHD--   Asthma--  COPD --    SOCIAL HISTORY SMOKING Ex smoker x 25 yrs   ETOH--     DRUGS--    REVIEW OF SYSTEMS:  CONSTITUTIONAL: No fever, weight loss, or fatigue   EYES: No eye pain, visual disturbances, or discharge  ENT:  No difficulty hearing, tinnitus, vertigo; No sinus or throat pain  NECK: No pain or stiffness   RESPIRATORY:  cough--   wheezing--   chills--   hemoptysis--    Shortness of Breath--  CARDIOVASCULAR: No chest pain, palpitations, passing out, dizziness, or leg swelling  GASTROINTESTINAL: No abdominal or epigastric pain. No nausea, vomiting, or hematemesis; No diarrhea or constipation. No melena or hematochezia.  GENITOURINARY: No dysuria, frequency, hematuria, or incontinence  NEUROLOGICAL: No headaches, memory loss, loss of strengthlegs++, no numbness, or tremors  SKIN: No itching, burning, rashes, or lesions   LYMPH Nodes: No enlarged glands  ALLERGY AND IMMUNOLOGIC: No hives or eczema      Vital Signs Last 24 Hrs  T(C): 37 (23 Apr 2019 07:32), Max: 37.5 (22 Apr 2019 22:37)  T(F): 98.6 (23 Apr 2019 07:32), Max: 99.5 (22 Apr 2019 22:37)  HR: 70 (23 Apr 2019 07:32) (70 - 99)  BP: 125/69 (23 Apr 2019 07:32) (120/67 - 142/89)  BP(mean): --  RR: 19 (23 Apr 2019 07:32) (16 - 19)  SpO2: 97% (23 Apr 2019 07:32) (94% - 97%)  I&O's Detail      PHYSICAL EXAMINATION:    GENERAL: The patient is an elderly in no apparent distress.   SKIN: No rashes ecchymoses or cyanosis  HEENT: Head is normocephalic and atraumatic. Extraocular muscles are intact. Mucous membranes are moist.   Neck supple LN not felt, JVP not increased  Thyroid not enlarged  Lymphatic: No lymphadenopathy  Cardiovascular:  S1 S2  heard ,RSR , JVP not increased , syst murmur at apex, No  gallop or rub  Respiratory:  Symmetrical chest wall movements Breathing vesicular , Percussion note normal no dulness   with fine RLL   rales , no  wheeze  ABDOMEN:  Soft, Non-tender,   No  hepatosplenomegaly ,BS positive		  Extremities: Normal range of motion, No clubbing, cyanosis or edema , No calf tenderness  Vascular: Peripheral pulses palpable 2+ bilaterally  CNS: Alert and oriented x 2  Mood and affect appropriate  Cranial nerves intact  sensory intact  motor Power5/5, DTR 2+   Babinski neg    LABS:                        14.6   7.40  )-----------( 202      ( 23 Apr 2019 05:58 )             43.0     04-23    137  |  103  |  19<H>  ----------------------------<  89  4.1   |  27  |  1.28    Ca    8.9      23 Apr 2019 05:58  Phos  2.9     04-23  Mg     2.0     04-23    TPro  6.8  /  Alb  3.3<L>  /  TBili  0.9  /  DBili  x   /  AST  41<H>  /  ALT  28  /  AlkPhos  72  04-23          CARDIAC MARKERS ( 23 Apr 2019 05:58 )  0.480 ng/mL / x     / 1839 U/L / x     / 2.0 ng/mL  CARDIAC MARKERS ( 22 Apr 2019 23:54 )  0.265 ng/mL / x     / x     / x     / x          MICROBIOLOGY:      RADIOLOGY & ADDITIONAL STUDIES:    CXR: increased vascular markings RLL, No avctive infiltrate  < from: Xray Chest 1 View- PORTABLE-Urgent (04.23.19 @ 01:05) >  The lungs  are clear.  No pleural abnormality is seen.         The  heart is enlarged in transverse diameter. No hilar mass. Trachea   midline.        Visualized osseous structures are intact. PULMONARY CONSULT NOTE      PAWAN STALEY  MRN-[id number]    Patient is a 89y old  Male who presents with a chief complaint of Weakness (23 Apr 2019 09:32)  No fever chills cough, N/v/D  Hx chart lab and xrays reviewd      HISTORY OF PRESENT ILLNESS:    Home Medications:  aspirin 81 mg oral tablet: 1 tab(s) orally 2 times a day (23 Apr 2019 04:15)  fosinopril 10 mg oral tablet: 1 tab(s) orally once a day (23 Apr 2019 04:15)  Lasix 40 mg oral tablet: 1 tab(s) orally once a day (23 Apr 2019 04:15)  lutein 20 mg oral tablet: 1 tab(s) orally once a day (23 Apr 2019 04:15)  simvastatin 10 mg oral tablet: 1 tab(s) orally once a day (at bedtime) (23 Apr 2019 04:15)  terazosin 5 mg oral capsule: 1 cap(s) orally once a day (at bedtime) (23 Apr 2019 04:15)      MEDICATIONS  (STANDING):  aspirin enteric coated 81 milliGRAM(s) Oral daily  cefepime   IVPB      doxazosin 4 milliGRAM(s) Oral at bedtime  enoxaparin Injectable 40 milliGRAM(s) SubCutaneous daily  furosemide    Tablet 40 milliGRAM(s) Oral daily  lisinopril 10 milliGRAM(s) Oral daily  metoprolol tartrate 25 milliGRAM(s) Oral every 12 hours  simvastatin 10 milliGRAM(s) Oral at bedtime      MEDICATIONS  (PRN):  ALBUTerol    90 MICROgram(s) HFA Inhaler 2 Puff(s) Inhalation every 6 hours PRN Bronchospasm      Allergies    No Known Allergies            PAST MEDICAL & SURGICAL HISTORY:  BPH (benign prostatic hyperplasia)  Hypercholesterolemia  History of hypertension  No significant past surgical history      FAMILY HISTORY:  HTN+    DM--   IHD--   Asthma--  COPD --    SOCIAL HISTORY SMOKING Ex smoker x 38 yrs   ETOH--     DRUGS--  earlier 1-2 PPD x35 yrs    REVIEW OF SYSTEMS:  CONSTITUTIONAL: No fever, weight loss, or fatigue   EYES: No eye pain, visual disturbances, or discharge  ENT:  No difficulty hearing, tinnitus, vertigo; No sinus or throat pain  NECK: No pain or stiffness   RESPIRATORY:  cough--   wheezing--   chills--   hemoptysis--    Shortness of Breath--  CARDIOVASCULAR: No chest pain, palpitations, passing out, dizziness, or leg swelling  GASTROINTESTINAL: No abdominal or epigastric pain. No nausea, vomiting, or hematemesis; No diarrhea or constipation. No melena or hematochezia.  GENITOURINARY: No dysuria, frequency, hematuria, or incontinence  NEUROLOGICAL: No headaches, memory loss, loss of strengthlegs++, no numbness, or tremors  SKIN: No itching, burning, rashes, or lesions   LYMPH Nodes: No enlarged glands  ALLERGY AND IMMUNOLOGIC: No hives or eczema      Vital Signs Last 24 Hrs  T(C): 37 (23 Apr 2019 07:32), Max: 37.5 (22 Apr 2019 22:37)  T(F): 98.6 (23 Apr 2019 07:32), Max: 99.5 (22 Apr 2019 22:37)  HR: 70 (23 Apr 2019 07:32) (70 - 99)  BP: 125/69 (23 Apr 2019 07:32) (120/67 - 142/89)  BP(mean): --  RR: 19 (23 Apr 2019 07:32) (16 - 19)  SpO2: 97% (23 Apr 2019 07:32) (94% - 97%)  I&O's Detail      PHYSICAL EXAMINATION:    GENERAL: The patient is an elderly in no apparent distress.   SKIN: No rashes ecchymoses or cyanosis  HEENT: Head is normocephalic and atraumatic. Extraocular muscles are intact. Mucous membranes are moist.   Neck supple LN not felt, JVP not increased  Thyroid not enlarged  Lymphatic: No lymphadenopathy  Cardiovascular:  S1 S2  heard ,RSR , JVP not increased , syst murmur at apex, No  gallop or rub  Respiratory:  Symmetrical chest wall movements Breathing vesicular , Percussion note normal no dulness   with fine RLL   rales , no  wheeze  ABDOMEN:  Soft, Non-tender,   No  hepatosplenomegaly ,BS positive		  Extremities: Normal range of motion, No clubbing, cyanosis or edema , No calf tenderness  Vascular: Peripheral pulses palpable 2+ bilaterally  CNS: Alert and oriented x 3  Mood and affect appropriate  Cranial nerves intact  sensory intact  motor Power5/5, DTR 2+   Babinski neg    LABS:                        14.6   7.40  )-----------( 202      ( 23 Apr 2019 05:58 )             43.0     04-23    137  |  103  |  19<H>  ----------------------------<  89  4.1   |  27  |  1.28    Ca    8.9      23 Apr 2019 05:58  Phos  2.9     04-23  Mg     2.0     04-23    TPro  6.8  /  Alb  3.3<L>  /  TBili  0.9  /  DBili  x   /  AST  41<H>  /  ALT  28  /  AlkPhos  72  04-23          CARDIAC MARKERS ( 23 Apr 2019 05:58 )  0.480 ng/mL / x     / 1839 U/L / x     / 2.0 ng/mL  CARDIAC MARKERS ( 22 Apr 2019 23:54 )  0.265 ng/mL / x     / x     / x     / x            RADIOLOGY & ADDITIONAL STUDIES:    CXR: increased vascular markings RLL, No avctive infiltrate  < from: Xray Chest 1 View- PORTABLE-Urgent (04.23.19 @ 01:05) >  The lungs  are clear.  No pleural abnormality is seen.         The  heart is enlarged in transverse diameter. No hilar mass. Trachea   midline.        Visualized osseous structures are intact.

## 2019-04-23 NOTE — CONSULT NOTE ADULT - SUBJECTIVE AND OBJECTIVE BOX
CONSULT: 89 Male with PMH of HTN, COPD, BPH, Macular degeneration, Latent TB came to hospital for generalized weakness and unable to walk properly for 2 days. He was doing his laundry yesterday when his legs gave up and he sat down. Was not able to get up. Called for assistance. Denies fall, syncope, trauma to head. Pt has also noticed subjective fevers last night and has been coughing with whitish sputum. No sick contacts. No chest pain, focal weakness, shortness of breath, abdominal pain, changes in urine or bowel.  SH: Uses walker, lives in [facility]. No smoking or alcohol.  Patient admitted for generalized weakness and possible right sided pneumonia. Found to have mild rhabdomyolysis and elevated troponins 0.265--0.48, ECG with old changes no new findings. Cardiologist was consulted for eval.      MEDICATIONS  (STANDING):  aspirin enteric coated 81 milliGRAM(s) Oral daily  cefepime   IVPB      doxazosin 4 milliGRAM(s) Oral at bedtime  enoxaparin Injectable 40 milliGRAM(s) SubCutaneous daily  furosemide    Tablet 40 milliGRAM(s) Oral daily  lisinopril 10 milliGRAM(s) Oral daily  metoprolol tartrate 25 milliGRAM(s) Oral every 12 hours  simvastatin 10 milliGRAM(s) Oral at bedtime    MEDICATIONS  (PRN):  ALBUTerol    90 MICROgram(s) HFA Inhaler 2 Puff(s) Inhalation every 6 hours PRN Bronchospasm            Vital Signs Last 24 Hrs  T(C): 37 (23 Apr 2019 07:32), Max: 37.5 (22 Apr 2019 22:37)  T(F): 98.6 (23 Apr 2019 07:32), Max: 99.5 (22 Apr 2019 22:37)  HR: 70 (23 Apr 2019 07:32) (70 - 99)  BP: 125/69 (23 Apr 2019 07:32) (120/67 - 142/89)  BP(mean): --  RR: 19 (23 Apr 2019 07:32) (16 - 19)  SpO2: 97% (23 Apr 2019 07:32) (94% - 97%)    REVIEW OF SYSTEMS:  CONSTITUTIONAL: No fever, weight loss, or fatigue  EYES: No eye pain, visual disturbances, or discharge  ENMT:  No difficulty hearing, tinnitus, vertigo; No sinus or throat pain  NECK: No pain or stiffness  RESPIRATORY: No cough, wheezing, chills or hemoptysis; No shortness of breath  CARDIOVASCULAR: No chest pain, palpitations, dizziness, or leg swelling  GASTROINTESTINAL: No abdominal or epigastric pain. No nausea, vomiting, or hematemesis; No diarrhea or constipation. No melena or hematochezia.    PHYSICAL EXAM:  · CONSTITUTIONAL:	Well-developed, well nourished    BMI-  · EYES:	EOMI; PERRL; no drainage or redness  · ENMT	No oral lesions; no gross abnormalities  · NECK:	No bruits; no thyromegaly or nodules  ·RESPIRATORY:   airway patent; breath sounds equal; good air movement; respirations non-labored; clear to auscultation bilaterally; no chest wall tenderness; no intercostal retractions; no rales,rhonchi or wheeze  · CARDIOVASCULAR	regular rate and rhythm  no rub  no murmur  normal PMI  . GASTROINTESTINAL:  no distention; no masses palpable; bowel sounds normal; no rebound tenderness; no guarding; no rigidity; no organomegaly  · EXTREMITIES: No cyanosis, clubbing or edema  · VASCULAR: 	Equal and normal pulses (carotid, femoral, dorsalis pedis)  ·NEUROLOGICAL:   alert and oriented x 3; sensation intact; deep reflexes intact; cranial nerves intact; no spontaneous movement; superficial reflexes intact; normal strength    TELEMETRY:    ECG:    TTE: pending    LABS:                        14.6   7.40  )-----------( 202      ( 23 Apr 2019 05:58 )             43.0     04-23    137  |  103  |  19<H>  ----------------------------<  89  4.1   |  27  |  1.28    Ca    8.9      23 Apr 2019 05:58  Phos  2.9     04-23  Mg     2.0     04-23    TPro  6.8  /  Alb  3.3<L>  /  TBili  0.9  /  DBili  x   /  AST  41<H>  /  ALT  28  /  AlkPhos  72  04-23    CARDIAC MARKERS ( 23 Apr 2019 05:58 )  0.480 ng/mL / x     / 1839 U/L / x     / 2.0 ng/mL  CARDIAC MARKERS ( 22 Apr 2019 23:54 )  0.265 ng/mL / x     / x     / x     / x            Creatinine Trend: 1.28<--, 1.29<--  I&O's Summary    BNP  RADIOLOGY & ADDITIONAL STUDIES:    IMPRESSION AND PLAN:    1) ACS R/O:  Patient p/w generalized weakness, elevated CK to 1839 due to rhabdomyolysis   No chest pain  Troponins 0.265--0.48  ECG:----  Last echo with Nl EF, grade 1 DD  Given no chest pain, no ECG changes, slight rhabdomylysis it is likely demand ischemia   Trend troponin  C/w aspirin  Hold simvastatin for now given rhabdomyolysis   F/up ECHO    2)HTN:   BP stable, c.w lopressor, lisinopril, lasix CONSULT: 89 Male with PMH of HTN, COPD, BPH, Macular degeneration, Latent TB came to hospital for generalized weakness and unable to walk properly for 2 days. He was doing his laundry yesterday when his legs gave up and he sat down. Was not able to get up. Called for assistance. Denies fall, syncope, trauma to head. Pt has also noticed subjective fevers last night and has been coughing with whitish sputum. No sick contacts. No chest pain, focal weakness, shortness of breath, abdominal pain, changes in urine or bowel.  SH: Uses walker, lives in [facility]. No smoking or alcohol.  Patient admitted for generalized weakness and possible right sided pneumonia VS COPD exacerbation. Found to have mild rhabdomyolysis and elevated troponins 0.265--0.48, ECG with old changes no new findings. Cardiologist was consulted for eval.      MEDICATIONS  (STANDING):  aspirin enteric coated 81 milliGRAM(s) Oral daily  cefepime   IVPB      doxazosin 4 milliGRAM(s) Oral at bedtime  enoxaparin Injectable 40 milliGRAM(s) SubCutaneous daily  furosemide    Tablet 40 milliGRAM(s) Oral daily  lisinopril 10 milliGRAM(s) Oral daily  metoprolol tartrate 25 milliGRAM(s) Oral every 12 hours  simvastatin 10 milliGRAM(s) Oral at bedtime    MEDICATIONS  (PRN):  ALBUTerol    90 MICROgram(s) HFA Inhaler 2 Puff(s) Inhalation every 6 hours PRN Bronchospasm            Vital Signs Last 24 Hrs  T(C): 37 (23 Apr 2019 07:32), Max: 37.5 (22 Apr 2019 22:37)  T(F): 98.6 (23 Apr 2019 07:32), Max: 99.5 (22 Apr 2019 22:37)  HR: 70 (23 Apr 2019 07:32) (70 - 99)  BP: 125/69 (23 Apr 2019 07:32) (120/67 - 142/89)  BP(mean): --  RR: 19 (23 Apr 2019 07:32) (16 - 19)  SpO2: 97% (23 Apr 2019 07:32) (94% - 97%)    REVIEW OF SYSTEMS:  CONSTITUTIONAL:+ fever, weight loss, or fatigue  ENMT:  No difficulty hearing, tinnitus, vertigo; No sinus or throat pain  NECK: No pain or stiffness  RESPIRATORY: + cough,+ wheezing, , +  shortness of breath  CARDIOVASCULAR: No chest pain, palpitations, dizziness, or leg swelling  GASTROINTESTINAL: No abdominal or epigastric pain. No nausea, vomiting, or hematemesis; No diarrhea or constipation. No melena or hematochezia.    PHYSICAL EXAM:  · CONSTITUTIONAL:	Well-developed, well nourished     · EYES:	EOMI; PERRL; no drainage or redness  · ENMT	No oral lesions; no gross abnormalities  · NECK:	No bruits; no thyromegaly or nodules  ·RESPIRATORY:   airway patent; breath sounds equal; good air movement; respirations non-labored; slight wheezing bilaterally  · CARDIOVASCULAR: regular rate and rhythm  no rub  no murmur  normal PMI  . GASTROINTESTINAL:  no distention; no masses palpable; bowel sounds normal; no rebound tenderness; no guarding; no rigidity; no organomegaly  · EXTREMITIES: No cyanosis, clubbing or edema    TELEMETRY: PVCs    ECG: sinus rhthym with 1st degree AV block, LAd,     TTE: Last ECHO 2017 with normal EF, grade 1 DD    LABS:                        14.6   7.40  )-----------( 202      ( 23 Apr 2019 05:58 )             43.0     04-23    137  |  103  |  19<H>  ----------------------------<  89  4.1   |  27  |  1.28    Ca    8.9      23 Apr 2019 05:58  Phos  2.9     04-23  Mg     2.0     04-23    TPro  6.8  /  Alb  3.3<L>  /  TBili  0.9  /  DBili  x   /  AST  41<H>  /  ALT  28  /  AlkPhos  72  04-23    CARDIAC MARKERS ( 23 Apr 2019 05:58 )  0.480 ng/mL / x     / 1839 U/L / x     / 2.0 ng/mL  CARDIAC MARKERS ( 22 Apr 2019 23:54 )  0.265 ng/mL / x     / x     / x     / x            Creatinine Trend: 1.28<--, 1.29<--  I&O's Summary    BNP  RADIOLOGY & ADDITIONAL STUDIES:    IMPRESSION AND PLAN:    1) Elevated troponins  Patient p/w generalized weakness, elevated CK to 1839 due to rhabdomyolysis   No chest pain  Troponins 0.265-->0.48  ECG: sinus rhthym with 1st degree AV block, LAd   Last echo with NL EF, grade 1 DD  Given no chest pain, no ECG changes, slight rhabdomyolysis it is likely demand ischemia   Trend troponins  C/w aspirin for now  Hold simvastatin for now given rhabdomyolysis   Given no acute findings no need for further cardiology w/up    2)HTN:   BP stable, c.w lopressor, lisinopril, lasix CONSULT: 89 Male with PMH of HTN, COPD, BPH, Macular degeneration, Latent TB came to hospital for generalized weakness and unable to walk properly for 2 days. He was doing his laundry yesterday when his legs gave up and he sat down. Was not able to get up. Called for assistance. Denies fall, syncope, trauma to head. Pt has also noticed subjective fevers last night and has been coughing with whitish sputum. No sick contacts. No chest pain, focal weakness, shortness of breath, abdominal pain, changes in urine or bowel.  SH: Uses walker, lives in [facility] senior living. No smoking or alcohol.  Patient admitted for generalized weakness and possible right sided pneumonia VS COPD exacerbation. Found to have mild rhabdomyolysis and elevated troponins 0.265--0.48, ECG with old changes no new findings. Cardiologist was consulted for eval.      MEDICATIONS  (STANDING):  aspirin enteric coated 81 milliGRAM(s) Oral daily  cefepime   IVPB      doxazosin 4 milliGRAM(s) Oral at bedtime  enoxaparin Injectable 40 milliGRAM(s) SubCutaneous daily  furosemide    Tablet 40 milliGRAM(s) Oral daily  lisinopril 10 milliGRAM(s) Oral daily  metoprolol tartrate 25 milliGRAM(s) Oral every 12 hours  simvastatin 10 milliGRAM(s) Oral at bedtime    MEDICATIONS  (PRN):  ALBUTerol    90 MICROgram(s) HFA Inhaler 2 Puff(s) Inhalation every 6 hours PRN Bronchospasm            Vital Signs Last 24 Hrs  T(C): 37 (23 Apr 2019 07:32), Max: 37.5 (22 Apr 2019 22:37)  T(F): 98.6 (23 Apr 2019 07:32), Max: 99.5 (22 Apr 2019 22:37)  HR: 70 (23 Apr 2019 07:32) (70 - 99)  BP: 125/69 (23 Apr 2019 07:32) (120/67 - 142/89)  BP(mean): --  RR: 19 (23 Apr 2019 07:32) (16 - 19)  SpO2: 97% (23 Apr 2019 07:32) (94% - 97%)    REVIEW OF SYSTEMS:  CONSTITUTIONAL:+ fever, weight loss, or fatigue  ENMT:  No difficulty hearing, tinnitus, vertigo; No sinus or throat pain  NECK: No pain or stiffness  RESPIRATORY: + cough,+ wheezing, , +  shortness of breath  CARDIOVASCULAR: No chest pain, palpitations, dizziness, or leg swelling  GASTROINTESTINAL: No abdominal or epigastric pain. No nausea, vomiting, or hematemesis; No diarrhea or constipation. No melena or hematochezia.    PHYSICAL EXAM:  · CONSTITUTIONAL:	Well-developed, well nourished     · EYES:	EOMI; PERRL; no drainage or redness  · ENMT	No oral lesions; no gross abnormalities  · NECK:	No bruits; no thyromegaly or nodules  ·RESPIRATORY:   airway patent; breath sounds equal; good air movement; respirations non-labored; slight wheezing bilaterally  · CARDIOVASCULAR: regular rate and rhythm  no rub  no murmur  normal PMI  . GASTROINTESTINAL:  no distention; no masses palpable; bowel sounds normal; no rebound tenderness; no guarding; no rigidity; no organomegaly  · EXTREMITIES: No cyanosis, clubbing or edema    TELEMETRY: PVCs    ECG: sinus rhthym with 1st degree AV block, LAd,     TTE: Last ECHO 2017 with normal EF, grade 1 DD    LABS:                        14.6   7.40  )-----------( 202      ( 23 Apr 2019 05:58 )             43.0     04-23    137  |  103  |  19<H>  ----------------------------<  89  4.1   |  27  |  1.28    Ca    8.9      23 Apr 2019 05:58  Phos  2.9     04-23  Mg     2.0     04-23    TPro  6.8  /  Alb  3.3<L>  /  TBili  0.9  /  DBili  x   /  AST  41<H>  /  ALT  28  /  AlkPhos  72  04-23    CARDIAC MARKERS ( 23 Apr 2019 05:58 )  0.480 ng/mL / x     / 1839 U/L / x     / 2.0 ng/mL  CARDIAC MARKERS ( 22 Apr 2019 23:54 )  0.265 ng/mL / x     / x     / x     / x            Creatinine Trend: 1.28<--, 1.29<--  I&O's Summary    BNP  RADIOLOGY & ADDITIONAL STUDIES:

## 2019-04-23 NOTE — CONSULT NOTE ADULT - ASSESSMENT
# RLL pneumonia    would recommend:    1. Obtain Procalcitonin level if less than 0.25 then discontinue All antibiotics  2. Obtain Legionella urine AG   3. Continue Cefepime until work up is done  4. CT chest without contrast for further assessment of pneumonia      will follow the patient with you and make further recommendation based on the clinical course and Lab results  Thank you for the opportunity to participate in Mr. STALEY's care Patient is a 89y old  Male with multiple co-morbidities including COPD not on home oxygen, presents to the ER for evaluation of generalized  Weakness and unable to walk properly for 2 days. He was doing his laundry yesterday when his legs gave up and he sat down, and was not able to get up, He Denies fall, syncope, trauma to head. He  also mentioned  subjective fever and productive cough. The CXR  shows RLL pneumonia, hence he has started on Cefepime and the ID consult requested to assist with further evaluation and antibiotic management.     # RLL pneumonia  # s/p Fall    would recommend:    1. Obtain Procalcitonin level if less than 0.25 then discontinue All antibiotics  2. Obtain Legionella urine AG   3. Continue Cefepime until work up is done  4. CT chest without contrast for further assessment of pneumonia    d/w Admitting Resident and Patient    will follow the patient with you and make further recommendation based on the clinical course and Lab results  Thank you for the opportunity to participate in Mr. STALEY's care

## 2019-04-23 NOTE — H&P ADULT - PROBLEM SELECTOR PLAN 1
Generalized weakness and unable to walk  3/5 strength in bilateral upper and lower ext  No focal deficit   Pt evaluation Generalized weakness and unable to walk  3/5 strength in bilateral upper and lower ext  No focal deficit   PT evaluation

## 2019-04-23 NOTE — H&P ADULT - NSHPPHYSICALEXAM_GEN_ALL_CORE
Vital Signs Last 24 Hrs  T(C): 37.5 (22 Apr 2019 22:37), Max: 37.5 (22 Apr 2019 22:37)  T(F): 99.5 (22 Apr 2019 22:37), Max: 99.5 (22 Apr 2019 22:37)  HR: 99 (22 Apr 2019 22:37) (99 - 99)  BP: 142/89 (22 Apr 2019 22:37) (142/89 - 142/89)  RR: 16 (22 Apr 2019 22:37) (16 - 16)  SpO2: 94% (22 Apr 2019 22:37) (94% - 94%)  .  GENERAL: Well developed, Elderly male, NAD  HEENT:  Normocephalic/Atraumatic, reactive light reflex, moist mucous membranes  NECK: Supple, no JVD  RESP: Symmetric movement of the chest, clear to auscultation bilaterally  CVS:  S1 and S2 audible, no murmur, rubs or gallops noted  GI: Normal active bowel sounds present, abdomen soft, non tender, non distended  EXTREMITIES:  No edema, no clubbing, cyanosis  MSK: 3/5 strength bilateral upper and lower extremities, intact sensations to light & crude touch  PSYCH: Normal mood, normal affect observed    NEURO: Alert and oriented x 3

## 2019-04-23 NOTE — PHYSICAL THERAPY INITIAL EVALUATION ADULT - IMPAIRMENTS FOUND, PT EVAL
posture/gait, locomotion, and balance/ventilation and respiration/gas exchange/aerobic capacity/endurance/muscle strength

## 2019-04-23 NOTE — ED PROVIDER NOTE - CLINICAL SUMMARY MEDICAL DECISION MAKING FREE TEXT BOX
89 y/o male presents s/p fall. Will check x-ray of right elbow, CXR, EKG, basic labs, troponin, give albuterol, nebulizer, and reassess.

## 2019-04-23 NOTE — ED ADULT NURSE NOTE - NSIMPLEMENTINTERV_GEN_ALL_ED
Implemented All Universal Safety Interventions:  [place] to call system. Call bell, personal items and telephone within reach. Instruct patient to call for assistance. Room bathroom lighting operational. Non-slip footwear when patient is off stretcher. Physically safe environment: no spills, clutter or unnecessary equipment. Stretcher in lowest position, wheels locked, appropriate side rails in place.

## 2019-04-23 NOTE — CONSULT NOTE ADULT - ASSESSMENT
COPD HX - NO CLINICAL PNEUMONIA  hTN   MACULAR dEGENERATION    plan- D/C LASIX  iNCREASE hYDRATION  dUONEB 1 DOSE QID by HFN   F/u CXR   Thanks for consult COPD HX - NO CLINICAL PNEUMONIA  hTN   MACULAR dEGENERATION    plan- D/C LASIX  INCREASE hYDRATION  dUONEB 1 DOSE QID by HFN   F/u CXR   Thanks for consult

## 2019-04-23 NOTE — H&P ADULT - NSICDXPASTMEDICALHX_GEN_ALL_CORE_FT
PAST MEDICAL HISTORY:  BPH (benign prostatic hyperplasia)     History of hypertension     Hypercholesterolemia

## 2019-04-23 NOTE — H&P ADULT - ASSESSMENT
Sore throat and sinus pressure x 2 days  89 Male with PMH of HTN, COPD, BPH, Macular degeneration, Latent TB came to hospital for generalized weakness and unable to walk properly for 2 days. Admitted to medicine for pneumonia.

## 2019-04-23 NOTE — ED PROVIDER NOTE - MUSCULOSKELETAL, MLM
No spinal tenderness, no findings of trauma to the head, no tenderness to extremities. Noted decreased ROM to right elbow, no tenderness or swelling.

## 2019-04-23 NOTE — CONSULT NOTE ADULT - ASSESSMENT
89 Male with PMH of HTN, COPD, BPH, Macular degeneration, Latent TB came to hospital for generalized weakness and unable to walk properly for 2 days, admitted for generalized weakness, COPD exacerbation Vs PNA, found to have mild trops and CK elevation, likely demand ischemia.     1)DEMAND ISCHEMIA:   Patient p/w generalized weakness, elevated CK to 1839 due to rhabdomyolysis   No chest pain  Troponins 0.265-->0.48  ECG: sinus rhthym with 1st degree AV block, LAd   Last echo with NL EF, grade 1 DD  Given no chest pain, no ECG changes, slight rhabdomyolysis it is likely demand ischemia   Trend troponins  C/w aspirin for now  Hold simvastatin for now given rhabdomyolysis   Given no acute findings no need for further cardiology w/up    2)HTN:   BP stable, c/w lopressor, lisinopril with parameters  Since currently looks dry on exam, consider dc lasix and give gentle hydration x 24 hours as has rhabdomyolysis 89 Male with PMH of HTN, COPD, BPH, Macular degeneration, Latent TB came to hospital for generalized weakness and unable to walk properly for 2 days, admitted for generalized weakness, COPD exacerbation Vs PNA, found to have mild trops and CK elevation, likely demand ischemia.     1)DEMAND ISCHEMIA, TYPE 2 MI:   Patient p/w generalized weakness, elevated CK to 1839 due to rhabdomyolysis   No chest pain  Troponins 0.265-->0.48  ECG: sinus rhthym with 1st degree AV block, LAd   Last echo with NL EF, grade 1 DD  Given no chest pain, no ECG changes, slight rhabdomyolysis it is likely demand ischemia   No need to trend further troponin just check CK  C/w aspirin for now  Hold simvastatin for now given rhabdomyolysis   Given no acute findings no need for further cardiology w/up  DC telemetry     2)HTN:   BP stable, c/w lopressor, lisinopril with parameters  Since currently looks dry on exam, consider dc lasix and give gentle hydration x 24 hours as has rhabdomyolysis

## 2019-04-23 NOTE — CONSULT NOTE ADULT - SUBJECTIVE AND OBJECTIVE BOX
Patient is a 89y old  Male who presents with a chief complaint of Weakness (23 Apr 2019 12:59)      INTERVAL HPI/OVERNIGHT EVENTS:    I&O's Summary      PAST MEDICAL & SURGICAL HISTORY:  BPH (benign prostatic hyperplasia)  Hypercholesterolemia  History of hypertension  No significant past surgical history      REVIEW OF SYSTEMS:  CONSTITUTIONAL: No fever, weight loss, or fatigue  EYES: No eye pain, visual disturbances, or discharge  ENMT:  No difficulty hearing, tinnitus, vertigo; No sinus or throat pain  NECK: No pain or stiffness  RESPIRATORY: No cough, wheezing, chills or hemoptysis; No shortness of breath  CARDIOVASCULAR: No chest pain, palpitations, dizziness, or leg swelling  GASTROINTESTINAL: No abdominal or epigastric pain. No nausea, vomiting, or hematemesis; No diarrhea or constipation. No melena or hematochezia.  GENITOURINARY: No dysuria, frequency, hematuria, or incontinence  NEUROLOGICAL: No headaches, memory loss, loss of strength, numbness, or tremors  SKIN: No itching, burning, rashes, or lesions   LYMPH NODES: No enlarged glands  ENDOCRINE: No heat or cold intolerance; No hair loss  MUSCULOSKELETAL: No joint pain or swelling; No muscle, back, or extremity pain  PSYCHIATRIC: No depression, anxiety, mood swings, or difficulty sleeping  HEME/LYMPH: No easy bruising, or bleeding gums  ALLERY AND IMMUNOLOGIC: No hives or eczema      SOCIAL HISTORY  Alcohol:  Tobacco:  Illicit substance use:      FAMILY HISTORY: Non contributory to the present illness        No Known Allergies        T(C): 36.8 (04-23-19 @ 17:15), Max: 37.5 (04-22-19 @ 22:37)  HR: 77 (04-23-19 @ 17:15) (66 - 99)  BP: 112/64 (04-23-19 @ 17:15) (96/60 - 142/89)  RR: 18 (04-23-19 @ 17:15) (16 - 19)  SpO2: 95% (04-23-19 @ 17:15) (92% - 99%)      PHYSICAL EXAM:  GENERAL: NAD, well-groomed, well-developed  HEAD:  Atraumatic, Normocephalic  EYES: EOMI, PERRLA, conjunctiva and sclera clear  ENMT: No tonsillar erythema, exudates, or enlargement; Moist mucous membranes, Good dentition, No lesions  NECK: Supple, No JVD, Normal thyroid  NERVOUS SYSTEM:  Alert & Oriented X3, Good concentration; Motor Strength 5/5 B/L upper and lower extremities; DTRs 2+ intact and symmetric  CHEST/LUNG: Clear to percussion bilaterally; No rales, rhonchi, wheezing, or rubs  HEART: Regular rate and rhythm; No murmurs, rubs, or gallops  ABDOMEN: Soft, Nontender, Nondistended; Bowel sounds present  EXTREMITIES:  2+ Peripheral Pulses, No clubbing, cyanosis, or edema  LYMPH: No lymphadenopathy noted  SKIN: No rashes or lesions      LABS:                        14.6   7.40  )-----------( 202      ( 23 Apr 2019 05:58 )             43.0     04-23    137  |  103  |  19<H>  ----------------------------<  89  4.1   |  27  |  1.28    Ca    8.9      23 Apr 2019 05:58  Phos  2.9     04-23  Mg     2.0     04-23    TPro  6.8  /  Alb  3.3<L>  /  TBili  0.9  /  DBili  x   /  AST  41<H>  /  ALT  28  /  AlkPhos  72  04-23              MEDICATIONS  (STANDING):  aspirin enteric coated 81 milliGRAM(s) Oral daily  cefepime   IVPB      doxazosin 4 milliGRAM(s) Oral at bedtime  enoxaparin Injectable 40 milliGRAM(s) SubCutaneous daily  guaiFENesin    Syrup 100 milliGRAM(s) Oral every 6 hours  lisinopril 10 milliGRAM(s) Oral daily  metoprolol tartrate 25 milliGRAM(s) Oral every 12 hours  sodium chloride 0.9%. 1000 milliLiter(s) (70 mL/Hr) IV Continuous <Continuous>    MEDICATIONS  (PRN):  ALBUTerol    90 MICROgram(s) HFA Inhaler 2 Puff(s) Inhalation every 6 hours PRN Bronchospasm          RADIOLOGY & ADDITIONAL TESTS:    < from: Xray Chest 1 View- PORTABLE-Urgent (04.23.19 @ 01:05) >  The lungs  are clear.  No pleural abnormality is seen.         The  heart is enlarged in transverse diameter. No hilar mass. Trachea   midline.        Visualized osseous structures are intact.    < end of copied text > Patient is a 89y old  Male with multiple co-morbidities including COPD not on home oxygen, presents to the ER for evaluation of generalized  Weakness and unable to walk properly for 2 days. He was doing his laundry yesterday when his legs gave up and he sat down, and was not able to get up, He Denies fall, syncope, trauma to head. He  also mentioned  subjective fever and productive cough. The CXR  shows RLL pneumonia, hence he has started on Cefepime and the ID consult requested to assist with further evaluation and antibiotic management.         REVIEW OF SYSTEMS: Total of twelve  systems have been reviewed and found to be negative unless mentioned in  HPI        PAST MEDICAL & SURGICAL HISTORY:  BPH (benign prostatic hyperplasia)  Hypercholesterolemia  Hypertension  COPD not on home oxygen  Latent  TB  Macular degeneration  No significant past surgical history        SOCIAL HISTORY  Alcohol: Does not drink  Tobacco: Does not smoke  Illicit substance use:  None        FAMILY HISTORY: Non contributory to the present illness        ALLERGIES: No Known Allergies        T(C): 36.8 (04-23-19 @ 17:15), Max: 37.5 (04-22-19 @ 22:37)  HR: 77 (04-23-19 @ 17:15) (66 - 99)  BP: 112/64 (04-23-19 @ 17:15) (96/60 - 142/89)  RR: 18 (04-23-19 @ 17:15) (16 - 19)  SpO2: 95% (04-23-19 @ 17:15) (92% - 99%)        PHYSICAL EXAM:  GENERAL: Not in distress  CHEST/LUNG: Air entry bilaterally  HEART: s1 and s2 present   ABDOMEN:  Nontender and  Nondistended  EXTREMITIES:  No pedal  edema  CNS: Awake And Alert        LABS:                        14.6   7.40  )-----------( 202      ( 23 Apr 2019 05:58 )             43.0       04-23    137  |  103  |  19<H>  ----------------------------<  89  4.1   |  27  |  1.28    Ca    8.9      23 Apr 2019 05:58  Phos  2.9     04-23  Mg     2.0     04-23    TPro  6.8  /  Alb  3.3<L>  /  TBili  0.9  /  DBili  x   /  AST  41<H>  /  ALT  28  /  AlkPhos  72  04-23        MEDICATIONS  (STANDING):  aspirin enteric coated 81 milliGRAM(s) Oral daily  cefepime   IVPB      doxazosin 4 milliGRAM(s) Oral at bedtime  enoxaparin Injectable 40 milliGRAM(s) SubCutaneous daily  guaiFENesin    Syrup 100 milliGRAM(s) Oral every 6 hours  lisinopril 10 milliGRAM(s) Oral daily  metoprolol tartrate 25 milliGRAM(s) Oral every 12 hours  sodium chloride 0.9%. 1000 milliLiter(s) (70 mL/Hr) IV Continuous <Continuous>    MEDICATIONS  (PRN):  ALBUTerol    90 MICROgram(s) HFA Inhaler 2 Puff(s) Inhalation every 6 hours PRN Bronchospasm          RADIOLOGY & ADDITIONAL TESTS:    4/23/19 : Xray Chest 1 View- PORTABLE-Urgent (04.23.19 @ 01:05) The lungs  are clear.  No pleural abnormality is seen.  The  heart is enlarged in transverse diameter. No hilar mass. Trachea   midline.  Visualized osseous structures are intact.

## 2019-04-23 NOTE — PHYSICAL THERAPY INITIAL EVALUATION ADULT - CRITERIA FOR SKILLED THERAPEUTIC INTERVENTIONS
impairments found/anticipated equipment needs at discharge/rehab potential/predicted duration of therapy intervention/functional limitations in following categories/therapy frequency/anticipated discharge recommendation

## 2019-04-23 NOTE — H&P ADULT - PROBLEM SELECTOR PLAN 3
Type 2  No chest pain  EKG: Old wide QRS complexes  Mildly elevated cardiac enzymes likely demand  Tele monitoring  C/w Aspirin and statin Type 2  No chest pain  EKG: Old wide QRS complexes  Mildly elevated cardiac enzymes likely demand  Tele monitoring  C/w Aspirin and statin  Consulted Dr Richardson

## 2019-04-23 NOTE — DISCHARGE NOTE PROVIDER - NSDCCPCAREPLAN_GEN_ALL_CORE_FT
PRINCIPAL DISCHARGE DIAGNOSIS  Diagnosis: Cough with fever  Assessment and Plan of Treatment: you were admitted for low grade fever and cough, you received prophylactic antibiotics. chest X ray was negative for pneumonia, FlU swab was also negative .  you were fever free during the hospital stay and your WBC count was also normal. follow up with PCP within 1 week      SECONDARY DISCHARGE DIAGNOSES  Diagnosis: COPD (chronic obstructive pulmonary disease)  Assessment and Plan of Treatment: Call your Health Care provider upon arrival home to make a follow up appointment within one week.  Take all inhalers as prescribed by your Health Care Provider.  If your cough increases infrequency and severity and/or you have shortness of breath or increased shortness of breath call your Health Care Provider.  If you develop fever, chills, night sweats, malaise, and/or change in mental status call your Health care Provider.  Nutrition is very important.  Eat small frequent meals.  Increase your activity as tolerated.       Diagnosis: NSTEMI (non-ST elevated myocardial infarction)  Assessment and Plan of Treatment: -your cardiac enzymes were mildly elevated, your 12 lead EKG was not changed from baseline and you had no complaints of chest pain. PRINCIPAL DISCHARGE DIAGNOSIS  Diagnosis: Cough with fever  Assessment and Plan of Treatment: you were admitted for low grade fever and cough, you received prophylactic antibiotics. chest X ray was negative for pneumonia, FlU swab was also negative .  you were fever free during the hospital stay and your WBC count was also normal. follow up with PCP within 1 week      SECONDARY DISCHARGE DIAGNOSES  Diagnosis: Rhabdomyolysis  Assessment and Plan of Treatment: your statin medication has been held as your creatinine kinase was elevated. it has been trending down. encourage adequate oral hydration and recheck CK within a weekof discharge. lasix is on hold, please discuss with your primary care provider before restarting it    Diagnosis: COPD (chronic obstructive pulmonary disease)  Assessment and Plan of Treatment: Call your Health Care provider upon arrival home to make a follow up appointment within one week.  Take all inhalers as prescribed by your Health Care Provider. complete your course of prednisone and zithromax  If your cough increases infrequency and severity and/or you have shortness of breath or increased shortness of breath call your Health Care Provider.  If you develop fever, chills, night sweats, malaise, and/or change in mental status call your Health care Provider.  Nutrition is very important.  Eat small frequent meals.  Increase your activity as tolerated.       Diagnosis: Rhabdomyolysis  Assessment and Plan of Treatment: Rhabdomyolysis    Diagnosis: NSTEMI (non-ST elevated myocardial infarction)  Assessment and Plan of Treatment: -your cardiac enzymes were mildly elevated, your 12 lead EKG was not changed from baseline and you had no complaints of chest pain. no further cardiac w/up needed

## 2019-04-23 NOTE — PHYSICAL THERAPY INITIAL EVALUATION ADULT - GAIT DISTANCE, PT EVAL
with seated rest break ~1min secondary to Pt fatigue/150 feet with seated rest break ~1min secondary to Pt fatigue.  Pt reported 5/10 exertion on mBorg/150 feet

## 2019-04-23 NOTE — H&P ADULT - ATTENDING COMMENTS
patient was seen and examined along with resident   above reviewed and agreed   cardiology and pulmonary eval

## 2019-04-24 DIAGNOSIS — M62.82 RHABDOMYOLYSIS: ICD-10-CM

## 2019-04-24 LAB
ANION GAP SERPL CALC-SCNC: 8 MMOL/L — SIGNIFICANT CHANGE UP (ref 5–17)
BUN SERPL-MCNC: 24 MG/DL — HIGH (ref 7–18)
CALCIUM SERPL-MCNC: 8.3 MG/DL — LOW (ref 8.4–10.5)
CHLORIDE SERPL-SCNC: 104 MMOL/L — SIGNIFICANT CHANGE UP (ref 96–108)
CK SERPL-CCNC: 1998 U/L — HIGH (ref 35–232)
CO2 SERPL-SCNC: 24 MMOL/L — SIGNIFICANT CHANGE UP (ref 22–31)
CREAT SERPL-MCNC: 1.09 MG/DL — SIGNIFICANT CHANGE UP (ref 0.5–1.3)
GLUCOSE SERPL-MCNC: 87 MG/DL — SIGNIFICANT CHANGE UP (ref 70–99)
HCT VFR BLD CALC: 43.7 % — SIGNIFICANT CHANGE UP (ref 39–50)
HGB BLD-MCNC: 14.6 G/DL — SIGNIFICANT CHANGE UP (ref 13–17)
MCHC RBC-ENTMCNC: 32.2 PG — SIGNIFICANT CHANGE UP (ref 27–34)
MCHC RBC-ENTMCNC: 33.4 GM/DL — SIGNIFICANT CHANGE UP (ref 32–36)
MCV RBC AUTO: 96.5 FL — SIGNIFICANT CHANGE UP (ref 80–100)
NRBC # BLD: 0 /100 WBCS — SIGNIFICANT CHANGE UP (ref 0–0)
PLATELET # BLD AUTO: 179 K/UL — SIGNIFICANT CHANGE UP (ref 150–400)
POTASSIUM SERPL-MCNC: 3.6 MMOL/L — SIGNIFICANT CHANGE UP (ref 3.5–5.3)
POTASSIUM SERPL-SCNC: 3.6 MMOL/L — SIGNIFICANT CHANGE UP (ref 3.5–5.3)
RBC # BLD: 4.53 M/UL — SIGNIFICANT CHANGE UP (ref 4.2–5.8)
RBC # FLD: 14.3 % — SIGNIFICANT CHANGE UP (ref 10.3–14.5)
SODIUM SERPL-SCNC: 136 MMOL/L — SIGNIFICANT CHANGE UP (ref 135–145)
WBC # BLD: 7.71 K/UL — SIGNIFICANT CHANGE UP (ref 3.8–10.5)
WBC # FLD AUTO: 7.71 K/UL — SIGNIFICANT CHANGE UP (ref 3.8–10.5)

## 2019-04-24 RX ORDER — AZITHROMYCIN 500 MG/1
500 TABLET, FILM COATED ORAL DAILY
Qty: 0 | Refills: 0 | Status: COMPLETED | OUTPATIENT
Start: 2019-04-24 | End: 2019-04-27

## 2019-04-24 RX ORDER — SODIUM CHLORIDE 9 MG/ML
1000 INJECTION INTRAMUSCULAR; INTRAVENOUS; SUBCUTANEOUS
Qty: 0 | Refills: 0 | Status: DISCONTINUED | OUTPATIENT
Start: 2019-04-24 | End: 2019-04-25

## 2019-04-24 RX ADMIN — Medication 81 MILLIGRAM(S): at 12:57

## 2019-04-24 RX ADMIN — CEFEPIME 100 MILLIGRAM(S): 1 INJECTION, POWDER, FOR SOLUTION INTRAMUSCULAR; INTRAVENOUS at 05:29

## 2019-04-24 RX ADMIN — Medication 100 MILLIGRAM(S): at 12:58

## 2019-04-24 RX ADMIN — Medication 25 MILLIGRAM(S): at 18:42

## 2019-04-24 RX ADMIN — ENOXAPARIN SODIUM 40 MILLIGRAM(S): 100 INJECTION SUBCUTANEOUS at 12:57

## 2019-04-24 RX ADMIN — Medication 100 MILLIGRAM(S): at 22:21

## 2019-04-24 RX ADMIN — Medication 40 MILLIGRAM(S): at 12:57

## 2019-04-24 RX ADMIN — Medication 4 MILLIGRAM(S): at 21:25

## 2019-04-24 RX ADMIN — SODIUM CHLORIDE 70 MILLILITER(S): 9 INJECTION INTRAMUSCULAR; INTRAVENOUS; SUBCUTANEOUS at 18:44

## 2019-04-24 RX ADMIN — Medication 100 MILLIGRAM(S): at 05:29

## 2019-04-24 RX ADMIN — Medication 25 MILLIGRAM(S): at 05:29

## 2019-04-24 NOTE — PROGRESS NOTE ADULT - SUBJECTIVE AND OBJECTIVE BOX
Patient is seen and examined at the bed side, is afebrile. The procalcitonin level is low.        REVIEW OF SYSTEMS: All other review systems are negative      ALLERGIES: No Known Allergies      Vital Signs Last 24 Hrs  T(C): 36.7 (24 Apr 2019 14:19), Max: 36.8 (23 Apr 2019 17:15)  T(F): 98.1 (24 Apr 2019 14:19), Max: 98.3 (23 Apr 2019 20:20)  HR: 89 (24 Apr 2019 14:19) (77 - 89)  BP: 121/60 (24 Apr 2019 14:19) (112/64 - 129/64)  BP(mean): --  RR: 17 (24 Apr 2019 14:19) (17 - 18)  SpO2: 97% (24 Apr 2019 14:19) (95% - 98%)        PHYSICAL EXAM:  GENERAL: Not in distress  CHEST/LUNG: Air entry bilaterally  HEART: s1 and s2 present   ABDOMEN:  Nontender and  Nondistended  EXTREMITIES:  No pedal  edema  CNS: Awake And Alert        LABS:                        14.6   7.71  )-----------( 179      ( 24 Apr 2019 08:06 )             43.7                           14.6   7.40  )-----------( 202      ( 23 Apr 2019 05:58 )             43.0         04-24    136  |  104  |  24<H>  ----------------------------<  87  3.6   |  24  |  1.09    Ca    8.3<L>      24 Apr 2019 08:06  Phos  2.9     04-23  Mg     2.0     04-23    TPro  6.8  /  Alb  3.3<L>  /  TBili  0.9  /  DBili  x   /  AST  41<H>  /  ALT  28  /  AlkPhos  72  04-23    04-23    137  |  103  |  19<H>  ----------------------------<  89  4.1   |  27  |  1.28    Ca    8.9      23 Apr 2019 05:58  Phos  2.9     04-23  Mg     2.0     04-23    TPro  6.8  /  Alb  3.3<L>  /  TBili  0.9  /  DBili  x   /  AST  41<H>  /  ALT  28  /  AlkPhos  72  04-23          MEDICATIONS  (STANDING):  aspirin enteric coated 81 milliGRAM(s) Oral daily  azithromycin   Tablet 500 milliGRAM(s) Oral daily  doxazosin 4 milliGRAM(s) Oral at bedtime  enoxaparin Injectable 40 milliGRAM(s) SubCutaneous daily  guaiFENesin    Syrup 100 milliGRAM(s) Oral every 6 hours  lisinopril 10 milliGRAM(s) Oral daily  metoprolol tartrate 25 milliGRAM(s) Oral every 12 hours  predniSONE   Tablet 40 milliGRAM(s) Oral daily  sodium chloride 0.9%. 1000 milliLiter(s) (70 mL/Hr) IV Continuous <Continuous>    MEDICATIONS  (PRN):  ALBUTerol    90 MICROgram(s) HFA Inhaler 2 Puff(s) Inhalation every 6 hours PRN Bronchospasm          RADIOLOGY & ADDITIONAL TESTS:    4/23/19 : Xray Chest 1 View- PORTABLE-Urgent (04.23.19 @ 01:05) The lungs  are clear.  No pleural abnormality is seen.  The  heart is enlarged in transverse diameter. No hilar mass. Trachea   midline.  Visualized osseous structures are intact.      Procalcitonin, Serum (04.23.19 @ 15:25) Procalcitonin, Serum: 0.11: Test Repeated  Procalcitonin (PCT) Interpretation (ng/mL) - Diagnosis of systemic bacterial infection/sepsis        MICROBIOLOGY DATA:    FLU A B RSV Detection by PCR (04.23.19 @ 07:36)    Flu A Result: NotDete: The Flu A B RSV assay is a Real-Time PCR test for the qualitative  detection and differentiation of Influenza A, Influenza B, and  Respiratory Syncytial Virus on nasopharyngeal swabs. The results should  be interpreted in the context of all clinical and laboratory findings.    Flu B Result: NotDetec    RSV Result: NotDetec Patient is seen and examined at the bed side, is afebrile. He is doing better and no complaints. The procalcitonin level is low.        REVIEW OF SYSTEMS: All other review systems are negative        ALLERGIES: No Known Allergies      Vital Signs Last 24 Hrs  T(C): 36.7 (24 Apr 2019 14:19), Max: 36.8 (23 Apr 2019 17:15)  T(F): 98.1 (24 Apr 2019 14:19), Max: 98.3 (23 Apr 2019 20:20)  HR: 89 (24 Apr 2019 14:19) (77 - 89)  BP: 121/60 (24 Apr 2019 14:19) (112/64 - 129/64)  BP(mean): --  RR: 17 (24 Apr 2019 14:19) (17 - 18)  SpO2: 97% (24 Apr 2019 14:19) (95% - 98%)        PHYSICAL EXAM:  GENERAL: Not in distress  CHEST/LUNG: Air entry bilaterally  HEART: s1 and s2 present   ABDOMEN:  Nontender and  Nondistended  EXTREMITIES:  No pedal  edema  CNS: Awake And Alert        LABS:                        14.6   7.71  )-----------( 179      ( 24 Apr 2019 08:06 )             43.7                           14.6   7.40  )-----------( 202      ( 23 Apr 2019 05:58 )             43.0         04-24    136  |  104  |  24<H>  ----------------------------<  87  3.6   |  24  |  1.09    Ca    8.3<L>      24 Apr 2019 08:06  Phos  2.9     04-23  Mg     2.0     04-23    TPro  6.8  /  Alb  3.3<L>  /  TBili  0.9  /  DBili  x   /  AST  41<H>  /  ALT  28  /  AlkPhos  72  04-23    04-23    137  |  103  |  19<H>  ----------------------------<  89  4.1   |  27  |  1.28    Ca    8.9      23 Apr 2019 05:58  Phos  2.9     04-23  Mg     2.0     04-23    TPro  6.8  /  Alb  3.3<L>  /  TBili  0.9  /  DBili  x   /  AST  41<H>  /  ALT  28  /  AlkPhos  72  04-23          MEDICATIONS  (STANDING):  aspirin enteric coated 81 milliGRAM(s) Oral daily  azithromycin   Tablet 500 milliGRAM(s) Oral daily  doxazosin 4 milliGRAM(s) Oral at bedtime  enoxaparin Injectable 40 milliGRAM(s) SubCutaneous daily  guaiFENesin    Syrup 100 milliGRAM(s) Oral every 6 hours  lisinopril 10 milliGRAM(s) Oral daily  metoprolol tartrate 25 milliGRAM(s) Oral every 12 hours  predniSONE   Tablet 40 milliGRAM(s) Oral daily  sodium chloride 0.9%. 1000 milliLiter(s) (70 mL/Hr) IV Continuous <Continuous>    MEDICATIONS  (PRN):  ALBUTerol    90 MICROgram(s) HFA Inhaler 2 Puff(s) Inhalation every 6 hours PRN Bronchospasm          RADIOLOGY & ADDITIONAL TESTS:    4/23/19 : Xray Chest 1 View- PORTABLE-Urgent (04.23.19 @ 01:05) The lungs  are clear.  No pleural abnormality is seen.  The  heart is enlarged in transverse diameter. No hilar mass. Trachea   midline.  Visualized osseous structures are intact.      Procalcitonin, Serum (04.23.19 @ 15:25) Procalcitonin, Serum: 0.11: Test Repeated  Procalcitonin (PCT) Interpretation (ng/mL) - Diagnosis of systemic bacterial infection/sepsis        MICROBIOLOGY DATA:    FLU A B RSV Detection by PCR (04.23.19 @ 07:36)    Flu A Result: NotDete: The Flu A B RSV assay is a Real-Time PCR test for the qualitative  detection and differentiation of Influenza A, Influenza B, and  Respiratory Syncytial Virus on nasopharyngeal swabs. The results should  be interpreted in the context of all clinical and laboratory findings.    Flu B Result: NotDete    [facility]: [facility]

## 2019-04-24 NOTE — PROGRESS NOTE ADULT - PROBLEM/PLAN-3
Chief Complaint  Chief Complaint   Patient presents with   • Flu Symptoms       Subjective   Amilcar Jolley is a 45 y.o. female.   Pt presents with c/o body aches, fatigue, nausea and vomiting which started 3 days ago but resolved but then developed head and chest congestion with cough that is keeping her awake at night.  Denies any known exposure to the flu.  Did have flu shot this year.     Flu Symptoms   This is a new problem. The current episode started in the past 7 days. The problem occurs intermittently. The problem has been gradually improving. Associated symptoms include chills, congestion, coughing, fatigue, a fever, myalgias, nausea, a sore throat and vomiting. The symptoms are aggravated by coughing. She has tried sleep and rest for the symptoms. The treatment provided no relief.        Current Outpatient Prescriptions on File Prior to Visit   Medication Sig Dispense Refill   • loratadine (CLARITIN) 10 MG tablet Take 1 tablet by mouth Daily. 30 tablet 0     No current facility-administered medications on file prior to visit.        Allergies   Allergen Reactions   • Penicillins Hives       History reviewed. No pertinent past medical history.    Past Surgical History:   Procedure Laterality Date   •  SECTION     • TUBAL ABDOMINAL LIGATION     • UTERINE FIBROID SURGERY         Family History   Problem Relation Age of Onset   • Cancer Mother        Social History     Social History   • Marital status:      Spouse name: N/A   • Number of children: N/A   • Years of education: N/A     Occupational History   • Not on file.     Social History Main Topics   • Smoking status: Never Smoker   • Smokeless tobacco: Not on file   • Alcohol use No   • Drug use: No   • Sexual activity: Not on file     Other Topics Concern   • Not on file     Social History Narrative   • No narrative on file       Review of Systems   Constitutional: Positive for chills, fatigue and fever.   HENT: Positive for congestion, ear  pain, postnasal drip, rhinorrhea and sore throat. Negative for trouble swallowing.    Respiratory: Positive for cough, chest tightness and wheezing. Negative for shortness of breath.    Gastrointestinal: Positive for nausea and vomiting. Negative for diarrhea.   Musculoskeletal: Positive for myalgias.   Psychiatric/Behavioral: Positive for sleep disturbance (decreased due to coughing).       Pulse 84  Temp 99.7 °F (37.6 °C) (Temporal Artery )   Resp 16  Wt 85.8 kg (189 lb 3.2 oz)  LMP 01/10/2018  SpO2 99%  BMI 36.95 kg/m2    Objective   Physical Exam   Constitutional: She is oriented to person, place, and time. She appears well-developed and well-nourished. She is cooperative. She has a sickly appearance. No distress.   HENT:   Head: Normocephalic and atraumatic.   Right Ear: Ear canal normal. There is swelling. Tympanic membrane is injected. A middle ear effusion is present.   Left Ear: Ear canal normal. There is swelling. Tympanic membrane is injected. A middle ear effusion is present.   Nose: Mucosal edema and rhinorrhea present.   Mouth/Throat: Uvula is midline. Posterior oropharyngeal erythema present. No oropharyngeal exudate or posterior oropharyngeal edema.   Neck: Normal range of motion. Neck supple.   Cardiovascular: Normal rate and regular rhythm.    Pulmonary/Chest: She has decreased breath sounds in the right upper field, the right lower field, the left upper field and the left lower field. She has no wheezes. She has rhonchi in the right upper field. She has no rales.   Lymphadenopathy:     She has cervical adenopathy.   Neurological: She is alert and oriented to person, place, and time.   Skin: Skin is warm and dry.       No results found for this or any previous visit.      Assessment/Plan   Amilcar was seen today for flu symptoms.    Diagnoses and all orders for this visit:    Bilateral acute serous otitis media, recurrence not specified    Flu-like symptoms  -     POC Influenza A / B    Other  orders  -     azithromycin (ZITHROMAX Z-LINDA) 250 MG tablet; Take 2 tablets the first day, then 1 tablet daily for 4 days.  -     promethazine-dextromethorphan (PROMETHAZINE-DM) 6.25-15 MG/5ML syrup; Take 5 mL by mouth 4 (Four) Times a Day As Needed for Cough for up to 5 days.    Instructed to f/u with PCP for worsening sx or any further concerns.  Take medication as directed.       STEPHANIE Vaughn     DISPLAY PLAN FREE TEXT

## 2019-04-24 NOTE — CHART NOTE - NSCHARTNOTEFT_GEN_A_CORE
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 Mr Alvarez does not have acute TB and is not contagious. 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 The document also states that Mr Alvarez does not have acute TB and is not contagious. He poses no threat to the facility he will be discharged to

## 2019-04-24 NOTE — PROGRESS NOTE ADULT - ASSESSMENT
Patient is a 89y old  Male with multiple co-morbidities including COPD not on home oxygen, presents to the ER for evaluation of generalized  Weakness and unable to walk properly for 2 days. He was doing his laundry yesterday when his legs gave up and he sat down, and was not able to get up, He Denies fall, syncope, trauma to head. He  also mentioned  subjective fever and productive cough. The CXR  shows RLL pneumonia, hence he has started on Cefepime and the ID consult requested to assist with further evaluation and antibiotic management.     # RLL pneumonia  # s/p Fall  # Procalcitonin level is low    would recommend:    1. Discontinue All antibiotics since Procalcitonin level low  2. OOb to chair    d/w   Patient    will follow the patient with you Patient is a 89y old  Male with multiple co-morbidities including COPD not on home oxygen, presents to the ER for evaluation of generalized  Weakness and unable to walk properly for 2 days. He was doing his laundry yesterday when his legs gave up and he sat down, and was not able to get up, He Denies fall, syncope, trauma to head. He  also mentioned  subjective fever and productive cough. The CXR  shows RLL pneumonia, hence he has started on Cefepime and the ID consult requested to assist with further evaluation and antibiotic management.     # RLL pneumonia  # s/p Fall  # Procalcitonin level is low    would recommend:    1. Discontinue All antibiotics since Procalcitonin level low  2. OOb to chair    d/w   Patient    will follow up

## 2019-04-24 NOTE — PROGRESS NOTE ADULT - SUBJECTIVE AND OBJECTIVE BOX
PAWAN STALEY   [id number]   89y/Male    Patient is a 89y old  Male who presents with a chief complaint of Weakness (24 Apr 2019 09:06)                                                                                     INTERVAL HPI/OVERNIGHT EVENTS:      no new events, patient is a new admission        Patient seen and examined at bedside.  Denies chest pain, palpitations, SOB, nausea, vomiting, abdominal pain.        T(C): 36.6 (04-24-19 @ 05:05), Max: 36.8 (04-23-19 @ 17:15)  HR: 80 (04-24-19 @ 05:05) (66 - 81)  BP: 129/64 (04-24-19 @ 05:05) (96/60 - 129/64)  RR: 17 (04-24-19 @ 05:05) (17 - 19)  SpO2: 95% (04-24-19 @ 05:05) (92% - 99%)  Wt(kg): --  I&O's Summary        REVIEW OF SYSTEMS:  No fever,   No cough, SOB, +wheezing  No chest pain, palpitations  No Abd pain, nausea, vomiting, No diarrhea or constipation                                                                                                    PHYSICAL EXAM    PHYSICAL EXAMINATION:    GENERAL: The patient is a well-developed, well-nourished in no apparent distress.   SKIN no rash ecchymoses or bruises  HEENT: Head is normocephalic and atraumatic  [place] , Extraocular muscles are intact. Mucous membranes  moist.   Neck supple ,No LN felt JVP not increased  Thyroid not enlarged  Cardiovascular:  S1 S2 heard, RSR, No JVD , systolic  murmur at apex, No gallop or rub  Respiratory: Chest wall symmetrical with good air entry ,Percussion note normal,    Lungs vesicular breathing with  no  rales, minimal   wheeze	  ABDOMEN:  Soft, Non-tender,  no hepatomegaly or splenomegaly BS positive	  Extremities: Normal range of motion, No clubbing, cyanosis or edema  Vascular: Peripheral pulses palpable 2+ bilaterally  CNS:  Alert and oriented x3                                                                                                           LABS:                        14.6   7.71  )-----------( 179      ( 24 Apr 2019 08:06 )             43.7     04-24    136  |  104  |  24<H>  ----------------------------<  87  3.6   |  24  |  1.09    Ca    8.3<L>      24 Apr 2019 08:06  Phos  2.9     04-23  Mg     2.0     04-23    TPro  6.8  /  Alb  3.3<L>  /  TBili  0.9  /  DBili  x   /  AST  41<H>  /  ALT  28  /  AlkPhos  72  04-23        CAPILLARY BLOOD GLUCOSE                CARDIAC MARKERS ( 24 Apr 2019 08:06 )  x     / x     / 1998 U/L / x     / x      CARDIAC MARKERS ( 23 Apr 2019 05:58 )  0.480 ng/mL / x     / 1839 U/L / x     / 2.0 ng/mL  CARDIAC MARKERS ( 22 Apr 2019 23:54 )  0.265 ng/mL / x     / x     / x     / x            Radiology:      EKG:

## 2019-04-24 NOTE — PROGRESS NOTE ADULT - SUBJECTIVE AND OBJECTIVE BOX
PULMONARY  progress note    PAWAN STALEY  MRN-[id number]    Patient is a 89y old  Male who presents with a chief complaint of Weakness (23 Apr 2019 17:41)  Feels better, less cough      MEDICATIONS  (STANDING):  aspirin enteric coated 81 milliGRAM(s) Oral daily  doxazosin 4 milliGRAM(s) Oral at bedtime  enoxaparin Injectable 40 milliGRAM(s) SubCutaneous daily  guaiFENesin    Syrup 100 milliGRAM(s) Oral every 6 hours  lisinopril 10 milliGRAM(s) Oral daily  metoprolol tartrate 25 milliGRAM(s) Oral every 12 hours  sodium chloride 0.9%. 1000 milliLiter(s) (70 mL/Hr) IV Continuous <Continuous>      Allergies    No Known Allergies        PAST MEDICAL & SURGICAL HISTORY:  BPH (benign prostatic hyperplasia)  Hypercholesterolemia  History of hypertension  No significant past surgical history           REVIEW OF SYSTEMS:  CONSTITUTIONAL: No fever, weight loss, or fatigue   EYES: No eye pain, visual disturbances, or discharge  ENT:  No difficulty hearing, tinnitus, vertigo; No sinus or throat pain  NECK: No pain or stiffness or nodes  RESPIRATORY:  cough+   wheezing--   chills--   hemoptysis--  Shortness of Breath--  CARDIOVASCULAR: No chest pain, palpitations, passing out, dizziness, or leg swelling  GASTROINTESTINAL: No abdominal or epigastric pain. No nausea, vomiting, or hematemesis; No diarrhea or constipation. No melena or hematochezia.  GENITOURINARY: No dysuria, frequency, hematuria, or incontinence  MUSCULOSKELETAL: No joint pain or swelling; No muscle, back, or extremity pain  PSYCHIATRIC: No depression, anxiety, mood swings, or difficulty sleeping  HEME/LYMPH: No easy bruising, or bleeding gums  ALLERGY AND IMMUNOLOGIC: No hives or eczema    Vital Signs Last 24 Hrs  T(C): 36.6 (24 Apr 2019 05:05), Max: 36.8 (23 Apr 2019 17:15)  T(F): 97.8 (24 Apr 2019 05:05), Max: 98.3 (23 Apr 2019 20:20)  HR: 80 (24 Apr 2019 05:05) (66 - 81)  BP: 129/64 (24 Apr 2019 05:05) (96/60 - 129/64)  BP(mean): 68 (23 Apr 2019 15:32) (68 - 68)  RR: 17 (24 Apr 2019 05:05) (17 - 19)  SpO2: 95% (24 Apr 2019 05:05) (92% - 99%)  I&O's Detail      PHYSICAL EXAMINATION:    GENERAL: The patient is a well-developed, well-nourished in no apparent distress.   SKIN no rash ecchymoses or bruises  HEENT: Head is normocephalic and atraumatic  [place] , Extraocular muscles are intact. Mucous membranes  moist.   Neck supple ,No LN felt JVP not increased  Thyroid not enlarged  Cardiovascular:  S1 S2 heard, RSR, No JVD , systolic  murmur at apex, No gallop or rub  Respiratory: Chest wall symmetrical with good air entry ,Percussion note normal,    Lungs vesicular breathing with  no  rales, minimal   wheeze	  ABDOMEN:  Soft, Non-tender,  no hepatomegaly or splenomegaly BS positive	  Extremities: Normal range of motion, No clubbing, cyanosis or edema  Vascular: Peripheral pulses palpable 2+ bilaterally  CNS:  Alert and oriented x3   Cranial nerves intact  sensory intact  motor power5/5  dtr 2+   Babinski neg    LABS:                        14.6   7.71  )-----------( 179      ( 24 Apr 2019 08:06 )             43.7     04-24    136  |  104  |  24<H>  ----------------------------<  87  3.6   |  24  |  1.09    Ca    8.3<L>      24 Apr 2019 08:06  Phos  2.9     04-23  Mg     2.0     04-23    TPro  6.8  /  Alb  3.3<L>  /  TBili  0.9  /  DBili  x   /  AST  41<H>  /  ALT  28  /  AlkPhos  72  04-23    CARDIAC MARKERS ( 24 Apr 2019 08:06 )  x     / x     / 1998 U/L / x     / x      CARDIAC MARKERS ( 23 Apr 2019 05:58 )  0.480 ng/mL / x     / 1839 U/L / x     / 2.0 ng/mL  CARDIAC MARKERS ( 22 Apr 2019 23:54 )  0.265 ng/mL / x     / x     / x     / x                Procalcitonin, Serum: 0.11 ng/mL (04-23-19 @ 15:25)      Folate, Serum: >20.0 ng/mL (04-23-19 @ 09:39)  Vitamin B12, Serum: 509 pg/mL (04-23-19 @ 09:39)      MICROBIOLOGY:        RADIOLOGY & ADDITIONAL STUDIES:    CXR: No infiltrate

## 2019-04-24 NOTE — PROGRESS NOTE ADULT - SUBJECTIVE AND OBJECTIVE BOX
PAWAN STALEY   [id number]   89y/Male    Patient is a 89y old  Male who presents with a chief complaint of Weakness (24 Apr 2019 09:06)                                                                                     INTERVAL HPI/OVERNIGHT EVENTS:      no new events, patient is a new admission        Patient seen and examined at bedside.  Denies chest pain, palpitations, SOB, nausea, vomiting, abdominal pain.        T(C): 36.6 (04-24-19 @ 05:05), Max: 36.8 (04-23-19 @ 17:15)  HR: 80 (04-24-19 @ 05:05) (66 - 81)  BP: 129/64 (04-24-19 @ 05:05) (96/60 - 129/64)  RR: 17 (04-24-19 @ 05:05) (17 - 19)  SpO2: 95% (04-24-19 @ 05:05) (92% - 99%)  Wt(kg): --  I&O's Summary        REVIEW OF SYSTEMS:  No fever,   No cough, SOB, +wheezing  No chest pain, palpitations  No Abd pain, nausea, vomiting, No diarrhea or constipation                                                                                                    PHYSICAL EXAM    PHYSICAL EXAMINATION:    GENERAL: The patient is a well-developed, well-nourished in no apparent distress.   SKIN no rash ecchymoses or bruises  HEENT: Head is normocephalic and atraumatic  [place] , Extraocular muscles are intact. Mucous membranes  moist.   Neck supple ,No LN felt JVP not increased  Thyroid not enlarged  Cardiovascular:  S1 S2 heard, RSR, No JVD , systolic  murmur at apex, No gallop or rub  Respiratory: Chest wall symmetrical with good air entry ,Percussion note normal,    Lungs vesicular breathing with  no  rales, minimal   wheeze	  ABDOMEN:  Soft, Non-tender,  no hepatomegaly or splenomegaly BS positive	  Extremities: Normal range of motion, No clubbing, cyanosis or edema  Vascular: Peripheral pulses palpable 2+ bilaterally  CNS:  Alert and oriented x3                                                                                                           LABS:                        14.6   7.71  )-----------( 179      ( 24 Apr 2019 08:06 )             43.7     04-24    136  |  104  |  24<H>  ----------------------------<  87  3.6   |  24  |  1.09    Ca    8.3<L>      24 Apr 2019 08:06  Phos  2.9     04-23  Mg     2.0     04-23    TPro  6.8  /  Alb  3.3<L>  /  TBili  0.9  /  DBili  x   /  AST  41<H>  /  ALT  28  /  AlkPhos  72  04-23        CAPILLARY BLOOD GLUCOSE                CARDIAC MARKERS ( 24 Apr 2019 08:06 )  x     / x     / 1998 U/L / x     / x      CARDIAC MARKERS ( 23 Apr 2019 05:58 )  0.480 ng/mL / x     / 1839 U/L / x     / 2.0 ng/mL  CARDIAC MARKERS ( 22 Apr 2019 23:54 )  0.265 ng/mL / x     / x     / x     / x            Radiology:      EKG:

## 2019-04-24 NOTE — PROGRESS NOTE ADULT - ASSESSMENT
COPD HX - NO CLINICAL PNEUMONIA  hTN   MACULAR dEGENERATION    plan- D/C LASIX  INCREASE hYDRATION  dUONEB 1 DOSE QID by HFN  po prednisone 40 mg qd x 4 days  po Zithromax x 5 days

## 2019-04-24 NOTE — PROGRESS NOTE ADULT - ASSESSMENT
89 Male with PMH of HTN, COPD, BPH, Macular degeneration, Latent TB came to hospital for generalized weakness and unable to walk properly for 2 days. Admitted for ambulatory dysfunction

## 2019-04-25 LAB
CK SERPL-CCNC: 989 U/L — HIGH (ref 35–232)
LEGIONELLA AG UR QL: NEGATIVE — SIGNIFICANT CHANGE UP
S PNEUM AG SER QL: SIGNIFICANT CHANGE UP

## 2019-04-25 RX ORDER — SODIUM CHLORIDE 9 MG/ML
1000 INJECTION INTRAMUSCULAR; INTRAVENOUS; SUBCUTANEOUS
Qty: 0 | Refills: 0 | Status: DISCONTINUED | OUTPATIENT
Start: 2019-04-25 | End: 2019-04-27

## 2019-04-25 RX ORDER — SIMVASTATIN 20 MG/1
1 TABLET, FILM COATED ORAL
Qty: 0 | Refills: 0 | COMMUNITY

## 2019-04-25 RX ORDER — LANOLIN ALCOHOL/MO/W.PET/CERES
3 CREAM (GRAM) TOPICAL AT BEDTIME
Qty: 0 | Refills: 0 | Status: DISCONTINUED | OUTPATIENT
Start: 2019-04-25 | End: 2019-04-27

## 2019-04-25 RX ORDER — FUROSEMIDE 40 MG
1 TABLET ORAL
Qty: 0 | Refills: 0 | COMMUNITY

## 2019-04-25 RX ORDER — LANOLIN ALCOHOL/MO/W.PET/CERES
3 CREAM (GRAM) TOPICAL ONCE
Qty: 0 | Refills: 0 | Status: COMPLETED | OUTPATIENT
Start: 2019-04-25 | End: 2019-04-25

## 2019-04-25 RX ORDER — AZITHROMYCIN 500 MG/1
1 TABLET, FILM COATED ORAL
Qty: 3 | Refills: 0 | OUTPATIENT
Start: 2019-04-25 | End: 2019-04-27

## 2019-04-25 RX ADMIN — ENOXAPARIN SODIUM 40 MILLIGRAM(S): 100 INJECTION SUBCUTANEOUS at 11:23

## 2019-04-25 RX ADMIN — Medication 30 MILLILITER(S): at 20:22

## 2019-04-25 RX ADMIN — Medication 4 MILLIGRAM(S): at 22:13

## 2019-04-25 RX ADMIN — Medication 25 MILLIGRAM(S): at 05:46

## 2019-04-25 RX ADMIN — AZITHROMYCIN 500 MILLIGRAM(S): 500 TABLET, FILM COATED ORAL at 11:23

## 2019-04-25 RX ADMIN — Medication 3 MILLIGRAM(S): at 22:13

## 2019-04-25 RX ADMIN — Medication 100 MILLIGRAM(S): at 05:46

## 2019-04-25 RX ADMIN — Medication 25 MILLIGRAM(S): at 17:10

## 2019-04-25 RX ADMIN — Medication 81 MILLIGRAM(S): at 11:23

## 2019-04-25 RX ADMIN — Medication 40 MILLIGRAM(S): at 05:46

## 2019-04-25 RX ADMIN — LISINOPRIL 10 MILLIGRAM(S): 2.5 TABLET ORAL at 05:46

## 2019-04-25 NOTE — PROGRESS NOTE ADULT - SUBJECTIVE AND OBJECTIVE BOX
Patient is seen and examined at the bed side, is afebrile. He has no new complaints. The Legionella urine AG and Blood cultures are negative to date.        REVIEW OF SYSTEMS: All other review systems are negative        ALLERGIES: No Known Allergies      Vital Signs Last 24 Hrs  T(C): 36.2 (25 Apr 2019 13:22), Max: 36.6 (24 Apr 2019 20:50)  T(F): 97.2 (25 Apr 2019 13:22), Max: 97.9 (24 Apr 2019 20:50)  HR: 70 (25 Apr 2019 13:22) (70 - 78)  BP: 119/42 (25 Apr 2019 13:22) (119/42 - 127/84)  BP(mean): --  RR: 17 (25 Apr 2019 13:22) (16 - 17)  SpO2: 96% (25 Apr 2019 13:22) (96% - 98%)      PHYSICAL EXAM:  GENERAL: Not in distress  CHEST/LUNG: Air entry bilaterally  HEART: s1 and s2 present   ABDOMEN:  Nontender and  Nondistended  EXTREMITIES:  No pedal  edema  CNS: Awake And Alert        LABS: No new Labs today                        14.6   7.71  )-----------( 179      ( 24 Apr 2019 08:06 )             43.7                           14.6   7.40  )-----------( 202      ( 23 Apr 2019 05:58 )             43.0       04-24    136  |  104  |  24<H>  ----------------------------<  87  3.6   |  24  |  1.09    Ca    8.3<L>      24 Apr 2019 08:06    TPro  6.8  /  Alb  3.3<L>  /  TBili  0.9  /  DBili  x   /  AST  41<H>  /  ALT  28  /  AlkPhos  72  04-23    04-23    137  |  103  |  19<H>  ----------------------------<  89  4.1   |  27  |  1.28    Ca    8.9      23 Apr 2019 05:58  Phos  2.9     04-23  Mg     2.0     04-23    TPro  6.8  /  Alb  3.3<L>  /  TBili  0.9  /  DBili  x   /  AST  41<H>  /  ALT  28  /  AlkPhos  72  04-23        MEDICATIONS  (STANDING):  aspirin enteric coated 81 milliGRAM(s) Oral daily  azithromycin   Tablet 500 milliGRAM(s) Oral daily  doxazosin 4 milliGRAM(s) Oral at bedtime  enoxaparin Injectable 40 milliGRAM(s) SubCutaneous daily  lisinopril 10 milliGRAM(s) Oral daily  metoprolol tartrate 25 milliGRAM(s) Oral every 12 hours  predniSONE   Tablet 40 milliGRAM(s) Oral daily  sodium chloride 0.9%. 1000 milliLiter(s) (70 mL/Hr) IV Continuous <Continuous>    MEDICATIONS  (PRN):  ALBUTerol    90 MICROgram(s) HFA Inhaler 2 Puff(s) Inhalation every 6 hours PRN Bronchospasm          RADIOLOGY & ADDITIONAL TESTS:    4/23/19 : Xray Chest 1 View- PORTABLE-Urgent (04.23.19 @ 01:05) The lungs  are clear.  No pleural abnormality is seen.  The  heart is enlarged in transverse diameter. No hilar mass. Trachea  midline.  Visualized osseous structures are intact.      Procalcitonin, Serum (04.23.19 @ 15:25) Procalcitonin, Serum: 0.11: Test Repeated  Procalcitonin (PCT) Interpretation (ng/mL) - Diagnosis of systemic bacterial infection/sepsis        MICROBIOLOGY DATA:    Legionella pneumophila Antigen, Urine (04.24.19 @ 18:34)    Legionella Antigen, Urine: Negative      Culture - Blood (04.23.19 @ 18:40)    Specimen Source: .Blood    Culture Results:   No growth to date.        FLU A B RSV Detection by PCR (04.23.19 @ 07:36)    Flu A Result: [facility]: The Flu A B RSV assay is a Real-Time PCR test for the qualitative  detection and differentiation of Influenza A, Influenza B, and  Respiratory Syncytial Virus on nasopharyngeal swabs. The results should  be interpreted in the context of all clinical and laboratory findings.    Flu B Result: [facility]    RSV Result: [facility]

## 2019-04-25 NOTE — PROGRESS NOTE ADULT - SUBJECTIVE AND OBJECTIVE BOX
PULMONARY  progress note    PAWAN STALEY  MRN-[id number]    Patient is a 89y old  Male who presents with a chief complaint of Weakness (25 Apr 2019 08:54)  Feels better, no cough      MEDICATIONS  (STANDING):  aspirin enteric coated 81 milliGRAM(s) Oral daily  azithromycin   Tablet 500 milliGRAM(s) Oral daily  doxazosin 4 milliGRAM(s) Oral at bedtime  enoxaparin Injectable 40 milliGRAM(s) SubCutaneous daily  lisinopril 10 milliGRAM(s) Oral daily  metoprolol tartrate 25 milliGRAM(s) Oral every 12 hours  predniSONE   Tablet 40 milliGRAM(s) Oral daily  sodium chloride 0.9%. 1000 milliLiter(s) (70 mL/Hr) IV Continuous <Continuous>      Allergies    No Known Allergies        PAST MEDICAL & SURGICAL HISTORY:  BPH (benign prostatic hyperplasia)  Hypercholesterolemia  History of hypertension  No significant past surgical history           REVIEW OF SYSTEMS:  CONSTITUTIONAL: No fever, weight loss, or fatigue   EYES: No eye pain, visual disturbances, or discharge  ENT:  No difficulty hearing, tinnitus, vertigo; No sinus or throat pain  NECK: No pain or stiffness or nodes  RESPIRATORY:  cough+   wheezing--   chills --  hemoptysis--  Shortness of Breath--  CARDIOVASCULAR: No chest pain, palpitations, passing out, dizziness, or leg swelling  GASTROINTESTINAL: No abdominal or epigastric pain. No nausea, vomiting, or hematemesis; No diarrhea or constipation. No melena or hematochezia.  HEME/LYMPH: No easy bruising, or bleeding gums  ALLERGY AND IMMUNOLOGIC: No hives or eczema    Vital Signs Last 24 Hrs  T(C): 36.2 (25 Apr 2019 05:19), Max: 36.7 (24 Apr 2019 14:19)  T(F): 97.1 (25 Apr 2019 05:19), Max: 98.1 (24 Apr 2019 14:19)  HR: 73 (25 Apr 2019 05:19) (73 - 89)  BP: 127/84 (25 Apr 2019 05:19) (121/55 - 127/84)  BP(mean): --  RR: 16 (25 Apr 2019 05:19) (16 - 17)  SpO2: 98% (25 Apr 2019 05:19) (97% - 98%)  I&O's Detail      PHYSICAL EXAMINATION:    GENERAL: The patient is a well-developed, well-nourished in no apparent distress.   SKIN no rash ecchymoses or bruises  HEENT: Head is normocephalic and atraumatic  [place] , Extraocular muscles are intact. Mucous membranes  moist.   Neck supple ,No LN felt JVP not increased  Thyroid not enlarged  Cardiovascular:  S1 S2 heard, RSR, No JVD , systolic  murmur at apex, No gallop or rub  Respiratory: Chest wall symmetrical with good air entry ,Percussion note normal,    Lungs vesicular breathing with no   rales or  wheeze, occasional rhonchi	  ABDOMEN:  Soft, Non-tender,  no hepatomegaly or splenomegaly BS positive	  Extremities: Normal range of motion, No clubbing, cyanosis or edema  Vascular: Peripheral pulses palpable 2+ bilaterally  CNS:  Alert and oriented x3   Cranial nerves intact  sensory intact  motor power5/5  dtr 2+   Babinski neg    LABS:                        14.6   7.71  )-----------( 179      ( 24 Apr 2019 08:06 )             43.7     04-24    136  |  104  |  24<H>  ----------------------------<  87  3.6   |  24  |  1.09    Ca    8.3<L>      24 Apr 2019 08:06    CARDIAC MARKERS ( 25 Apr 2019 06:10 )  x     / x     / 989 U/L / x     / x      CARDIAC MARKERS ( 24 Apr 2019 08:06 )  x     / x     / 1998 U/L / x     / x            Procalcitonin, Serum: 0.11 ng/mL (04-23-19 @ 15:25)      Folate, Serum: >20.0 ng/mL (04-23-19 @ 09:39)  Vitamin B12, Serum: 509 pg/mL (04-23-19 @ 09:39)      MICROBIOLOGY:    Culture - Blood (collected 04-23-19 @ 18:40)  Source: .Blood  Preliminary Report (04-24-19 @ 19:01):    No growth to date.

## 2019-04-25 NOTE — PROGRESS NOTE ADULT - SUBJECTIVE AND OBJECTIVE BOX
PAWAN STALEY   [id number]   89y/Male    Patient is a 89y old  Male who presents with a chief complaint of Weakness (24 Apr 2019 09:06)                                                                                     INTERVAL HPI/OVERNIGHT EVENTS:      no new events overnight. patient pending auth for placement        Patient seen and examined at bedside.  Denies chest pain, palpitations, SOB, nausea, vomiting, abdominal pain.      ICU Vital Signs Last 24 Hrs  T(C): 36.2 (25 Apr 2019 05:19), Max: 36.7 (24 Apr 2019 14:19)  T(F): 97.1 (25 Apr 2019 05:19), Max: 98.1 (24 Apr 2019 14:19)  HR: 73 (25 Apr 2019 05:19) (73 - 89)  BP: 127/84 (25 Apr 2019 05:19) (121/55 - 127/84)  BP(mean): --  ABP: --  ABP(mean): --  RR: 16 (25 Apr 2019 05:19) (16 - 17)  SpO2: 98% (25 Apr 2019 05:19) (97% - 98%)        REVIEW OF SYSTEMS:  No fever,   No cough, SOB, no wheezing  No chest pain, palpitations  No Abd pain, nausea, vomiting, No diarrhea or constipation                                                                                                    PHYSICAL EXAM    PHYSICAL EXAMINATION:    GENERAL: The patient is a well-developed, well-nourished in no apparent distress.   SKIN no rash ecchymoses or bruises  HEENT: Head is normocephalic and atraumatic  [place] , Extraocular muscles are intact. Mucous membranes  moist.   Neck supple ,No LN felt JVP not increased  Thyroid not enlarged  Cardiovascular:  S1 S2 heard, RSR, No JVD , systolic  murmur at apex, No gallop or rub  Respiratory: Chest wall symmetrical with good air entry ,Percussion note normal,    Lungs vesicular breathing with  no  rales, minimal   wheeze	  ABDOMEN:  Soft, Non-tender,  no hepatomegaly or splenomegaly BS positive	  Extremities: Normal range of motion, No clubbing, cyanosis or edema  Vascular: Peripheral pulses palpable 2+ bilaterally  CNS:  Alert and oriented x3                                                                                                           LABS:                        14.6   7.71  )-----------( 179      ( 24 Apr 2019 08:06 )             43.7     04-24    136  |  104  |  24<H>  ----------------------------<  87  3.6   |  24  |  1.09    Ca    8.3<L>      24 Apr 2019 08:06  Phos  2.9     04-23  Mg     2.0     04-23    TPro  6.8  /  Alb  3.3<L>  /  TBili  0.9  /  DBili  x   /  AST  41<H>  /  ALT  28  /  AlkPhos  72  04-23        CAPILLARY BLOOD GLUCOSE                CARDIAC MARKERS ( 24 Apr 2019 08:06 )  x     / x     / 1998 U/L / x     / x      CARDIAC MARKERS ( 23 Apr 2019 05:58 )  0.480 ng/mL / x     / 1839 U/L / x     / 2.0 ng/mL  CARDIAC MARKERS ( 22 Apr 2019 23:54 )  0.265 ng/mL / x     / x     / x     / x            Radiology:      EKG:

## 2019-04-25 NOTE — PROGRESS NOTE ADULT - ASSESSMENT
COPD HX - NO CLINICAL PNEUMONIA  hTN   MACULAR dEGENERATION    plan- dUONEB 1 DOSE QID by HFN  po prednisone 40 mg qd x 4 days  po Zithromax x 5 days  OOB / BRP   Will follow if called

## 2019-04-25 NOTE — PROGRESS NOTE ADULT - ASSESSMENT
Patient is a 89y old  Male with multiple co-morbidities including COPD not on home oxygen, presents to the ER for evaluation of generalized  Weakness and unable to walk properly for 2 days. He was doing his laundry yesterday when his legs gave up and he sat down, and was not able to get up, He Denies fall, syncope, trauma to head. He  also mentioned  subjective fever and productive cough. The CXR  shows RLL pneumonia, hence he has started on Cefepime and the ID consult requested to assist with further evaluation and antibiotic management.     # RLL pneumonia  # s/p Fall  # Procalcitonin level is low    would recommend:    1. Monitor OFF antibiotics   2. If spikes fever T>100.4 then obtain cultures  3. OOb to chair    d/w Patient  -Awaiting for NH placement

## 2019-04-26 LAB
CK SERPL-CCNC: 658 U/L — HIGH (ref 35–232)
LEGIONELLA AG UR QL: NEGATIVE — SIGNIFICANT CHANGE UP

## 2019-04-26 RX ORDER — TERAZOSIN HYDROCHLORIDE 10 MG/1
1 CAPSULE ORAL
Qty: 30 | Refills: 0
Start: 2019-04-26 | End: 2019-05-25

## 2019-04-26 RX ORDER — ASPIRIN/CALCIUM CARB/MAGNESIUM 324 MG
1 TABLET ORAL
Qty: 0 | Refills: 0 | COMMUNITY

## 2019-04-26 RX ORDER — FOSINOPRIL SODIUM 10 MG/1
1 TABLET ORAL
Qty: 30 | Refills: 0 | OUTPATIENT
Start: 2019-04-26 | End: 2019-05-25

## 2019-04-26 RX ORDER — FOSINOPRIL SODIUM 10 MG/1
1 TABLET ORAL
Qty: 30 | Refills: 0
Start: 2019-04-26 | End: 2019-05-25

## 2019-04-26 RX ORDER — AZITHROMYCIN 500 MG/1
1 TABLET, FILM COATED ORAL
Qty: 2 | Refills: 0
Start: 2019-04-26 | End: 2019-04-27

## 2019-04-26 RX ORDER — METOPROLOL TARTRATE 50 MG
1 TABLET ORAL
Qty: 60 | Refills: 0
Start: 2019-04-26 | End: 2019-05-25

## 2019-04-26 RX ORDER — TERAZOSIN HYDROCHLORIDE 10 MG/1
1 CAPSULE ORAL
Qty: 0 | Refills: 0 | COMMUNITY

## 2019-04-26 RX ORDER — ASPIRIN/CALCIUM CARB/MAGNESIUM 324 MG
1 TABLET ORAL
Qty: 60 | Refills: 0
Start: 2019-04-26 | End: 2019-05-25

## 2019-04-26 RX ORDER — ASPIRIN/CALCIUM CARB/MAGNESIUM 324 MG
1 TABLET ORAL
Qty: 60 | Refills: 0 | OUTPATIENT
Start: 2019-04-26 | End: 2019-05-25

## 2019-04-26 RX ORDER — TERAZOSIN HYDROCHLORIDE 10 MG/1
1 CAPSULE ORAL
Qty: 30 | Refills: 0 | OUTPATIENT
Start: 2019-04-26 | End: 2019-05-25

## 2019-04-26 RX ORDER — ALBUTEROL 90 UG/1
2 AEROSOL, METERED ORAL
Qty: 1 | Refills: 0
Start: 2019-04-26 | End: 2019-05-25

## 2019-04-26 RX ORDER — METOPROLOL TARTRATE 50 MG
1 TABLET ORAL
Qty: 60 | Refills: 0 | OUTPATIENT
Start: 2019-04-26 | End: 2019-05-25

## 2019-04-26 RX ORDER — AZITHROMYCIN 500 MG/1
1 TABLET, FILM COATED ORAL
Qty: 2 | Refills: 0 | OUTPATIENT
Start: 2019-04-26 | End: 2019-04-27

## 2019-04-26 RX ORDER — FOSINOPRIL SODIUM 10 MG/1
1 TABLET ORAL
Qty: 0 | Refills: 0 | COMMUNITY

## 2019-04-26 RX ADMIN — Medication 40 MILLIGRAM(S): at 05:51

## 2019-04-26 RX ADMIN — Medication 25 MILLIGRAM(S): at 17:04

## 2019-04-26 RX ADMIN — Medication 25 MILLIGRAM(S): at 05:51

## 2019-04-26 RX ADMIN — ENOXAPARIN SODIUM 40 MILLIGRAM(S): 100 INJECTION SUBCUTANEOUS at 11:17

## 2019-04-26 RX ADMIN — LISINOPRIL 10 MILLIGRAM(S): 2.5 TABLET ORAL at 05:51

## 2019-04-26 RX ADMIN — Medication 81 MILLIGRAM(S): at 11:17

## 2019-04-26 RX ADMIN — AZITHROMYCIN 500 MILLIGRAM(S): 500 TABLET, FILM COATED ORAL at 11:17

## 2019-04-26 NOTE — PROGRESS NOTE ADULT - ASSESSMENT
Patient is a 89y old  Male with multiple co-morbidities including COPD not on home oxygen, presents to the ER for evaluation of generalized  Weakness and unable to walk properly for 2 days. He was doing his laundry yesterday when his legs gave up and he sat down, and was not able to get up, He Denies fall, syncope, trauma to head. He  also mentioned  subjective fever and productive cough. The CXR  shows RLL pneumonia, hence he has started on Cefepime and the ID consult requested to assist with further evaluation and antibiotic management.     # RLL pneumonia  # s/p Fall  # Procalcitonin level is low    would recommend:    1. Monitor OFF antibiotics   2. If spikes fever T>100.4 then obtain cultures  3. OOb to chair    d/w Patient  -Awaiting for NH placement Patient is a 89y old  Male with multiple co-morbidities including COPD not on home oxygen, presents to the ER for evaluation of generalized  Weakness and unable to walk properly for 2 days. He was doing his laundry yesterday when his legs gave up and he sat down, and was not able to get up, He Denies fall, syncope, trauma to head. He  also mentioned  subjective fever and productive cough. The CXR  shows RLL pneumonia, hence he has started on Cefepime and the ID consult requested to assist with further evaluation and antibiotic management.     # RLL pneumonia - The Legionella urine AG and Blood cultures are negative to date.  # s/p Fall  # Procalcitonin level is low    would recommend:    1. OOb to chair  2. Monitor OFF antibiotics   3. If spikes fever T>100.4 then obtain cultures     d/w Patient  -Awaiting for NH placement

## 2019-04-26 NOTE — PROGRESS NOTE ADULT - PROVIDER SPECIALTY LIST ADULT
Infectious Disease
Internal Medicine
Pulmonology
Pulmonology
Infectious Disease

## 2019-04-26 NOTE — PROGRESS NOTE ADULT - NSHPATTENDINGPLANDISCUSS_GEN_ALL_CORE
resident and PMD
resident and PMD
resident , patient and his family.
resident , patrient and his family
resident, natacha , patient and his daughter.

## 2019-04-26 NOTE — PROGRESS NOTE ADULT - SUBJECTIVE AND OBJECTIVE BOX
Patient is seen and examined at the bed side, is afebrile. He has no new complaints. The Legionella urine AG and Blood cultures are negative to date.        REVIEW OF SYSTEMS: All other review systems are negative        ALLERGIES: No Known Allergies      Vital Signs Last 24 Hrs  T(C): 36.5 (26 Apr 2019 14:30), Max: 36.6 (26 Apr 2019 05:05)  T(F): 97.7 (26 Apr 2019 14:30), Max: 97.8 (26 Apr 2019 05:05)  HR: 81 (26 Apr 2019 14:30) (69 - 82)  BP: 114/66 (26 Apr 2019 14:30) (114/66 - 128/79)  BP(mean): --  RR: 16 (26 Apr 2019 14:30) (16 - 16)  SpO2: 98% (26 Apr 2019 14:30) (97% - 98%)        PHYSICAL EXAM:  GENERAL: Not in distress  CHEST/LUNG: Air entry bilaterally  HEART: s1 and s2 present   ABDOMEN:  Nontender and  Nondistended  EXTREMITIES:  No pedal  edema  CNS: Awake And Alert        LABS: No new Labs today                          14.6   7.71  )-----------( 179      ( 24 Apr 2019 08:06 )             43.7                           14.6   7.40  )-----------( 202      ( 23 Apr 2019 05:58 )             43.0         04-24    136  |  104  |  24<H>  ----------------------------<  87  3.6   |  24  |  1.09    Ca    8.3<L>      24 Apr 2019 08:06    TPro  6.8  /  Alb  3.3<L>  /  TBili  0.9  /  DBili  x   /  AST  41<H>  /  ALT  28  /  AlkPhos  72  04-23    04-23    137  |  103  |  19<H>  ----------------------------<  89  4.1   |  27  |  1.28    Ca    8.9      23 Apr 2019 05:58  Phos  2.9     04-23  Mg     2.0     04-23    TPro  6.8  /  Alb  3.3<L>  /  TBili  0.9  /  DBili  x   /  AST  41<H>  /  ALT  28  /  AlkPhos  72  04-23        MEDICATIONS  (STANDING):  aspirin enteric coated 81 milliGRAM(s) Oral daily  azithromycin   Tablet 500 milliGRAM(s) Oral daily  doxazosin 4 milliGRAM(s) Oral at bedtime  enoxaparin Injectable 40 milliGRAM(s) SubCutaneous daily  lisinopril 10 milliGRAM(s) Oral daily  melatonin 3 milliGRAM(s) Oral at bedtime  metoprolol tartrate 25 milliGRAM(s) Oral every 12 hours  predniSONE   Tablet 40 milliGRAM(s) Oral daily  sodium chloride 0.9%. 1000 milliLiter(s) (70 mL/Hr) IV Continuous <Continuous>    MEDICATIONS  (PRN):  ALBUTerol    90 MICROgram(s) HFA Inhaler 2 Puff(s) Inhalation every 6 hours PRN Bronchospasm  aluminum hydroxide/magnesium hydroxide/simethicone Suspension 30 milliLiter(s) Oral every 6 hours PRN Dyspepsia          RADIOLOGY & ADDITIONAL TESTS:    4/23/19 : Xray Chest 1 View- PORTABLE-Urgent (04.23.19 @ 01:05) The lungs  are clear.  No pleural abnormality is seen.  The  heart is enlarged in transverse diameter. No hilar mass. Trachea  midline.  Visualized osseous structures are intact.      Procalcitonin, Serum (04.23.19 @ 15:25) Procalcitonin, Serum: 0.11: Test Repeated  Procalcitonin (PCT) Interpretation (ng/mL) - Diagnosis of systemic bacterial infection/sepsis        MICROBIOLOGY DATA:      Legionella pneumophila Antigen, Urine (04.24.19 @ 18:34)    Legionella Antigen, Urine: Negative      Culture - Blood (04.23.19 @ 18:40)    Specimen Source: .Blood    Culture Results:   No growth to date.        FLU A B RSV Detection by PCR (04.23.19 @ 07:36)    [facility]: The Flu A B RSV assay is a Real-Time PCR test for the qualitative  detection and differentiation of Influenza A, Influenza B, and  Respiratory Syncytial Virus on nasopharyngeal swabs. The results should  be interpreted in the context of all clinical and laboratory findings.    Flu B Result: [facility]    RSV Result: [facility] Patient is seen and examined at the bed side, remains afebrile. He is doing fine and has no new complaints.         REVIEW OF SYSTEMS: All other review systems are negative        ALLERGIES: No Known Allergies        Vital Signs Last 24 Hrs  T(C): 36.5 (26 Apr 2019 14:30), Max: 36.6 (26 Apr 2019 05:05)  T(F): 97.7 (26 Apr 2019 14:30), Max: 97.8 (26 Apr 2019 05:05)  HR: 81 (26 Apr 2019 14:30) (69 - 82)  BP: 114/66 (26 Apr 2019 14:30) (114/66 - 128/79)  BP(mean): --  RR: 16 (26 Apr 2019 14:30) (16 - 16)  SpO2: 98% (26 Apr 2019 14:30) (97% - 98%)        PHYSICAL EXAM:  GENERAL: Not in distress  CHEST/LUNG: Air entry bilaterally  HEART: s1 and s2 present   ABDOMEN:  Nontender and  Nondistended  EXTREMITIES:  No pedal  edema  CNS: Awake And Alert        LABS: No new Labs today                          14.6   7.71  )-----------( 179      ( 24 Apr 2019 08:06 )             43.7                           14.6   7.40  )-----------( 202      ( 23 Apr 2019 05:58 )             43.0         04-24    136  |  104  |  24<H>  ----------------------------<  87  3.6   |  24  |  1.09    Ca    8.3<L>      24 Apr 2019 08:06    TPro  6.8  /  Alb  3.3<L>  /  TBili  0.9  /  DBili  x   /  AST  41<H>  /  ALT  28  /  AlkPhos  72  04-23    04-23    137  |  103  |  19<H>  ----------------------------<  89  4.1   |  27  |  1.28    Ca    8.9      23 Apr 2019 05:58  Phos  2.9     04-23  Mg     2.0     04-23    TPro  6.8  /  Alb  3.3<L>  /  TBili  0.9  /  DBili  x   /  AST  41<H>  /  ALT  28  /  AlkPhos  72  04-23        MEDICATIONS  (STANDING):  aspirin enteric coated 81 milliGRAM(s) Oral daily  azithromycin   Tablet 500 milliGRAM(s) Oral daily  doxazosin 4 milliGRAM(s) Oral at bedtime  enoxaparin Injectable 40 milliGRAM(s) SubCutaneous daily  lisinopril 10 milliGRAM(s) Oral daily  melatonin 3 milliGRAM(s) Oral at bedtime  metoprolol tartrate 25 milliGRAM(s) Oral every 12 hours  predniSONE   Tablet 40 milliGRAM(s) Oral daily  sodium chloride 0.9%. 1000 milliLiter(s) (70 mL/Hr) IV Continuous <Continuous>    MEDICATIONS  (PRN):  ALBUTerol    90 MICROgram(s) HFA Inhaler 2 Puff(s) Inhalation every 6 hours PRN Bronchospasm  aluminum hydroxide/magnesium hydroxide/simethicone Suspension 30 milliLiter(s) Oral every 6 hours PRN Dyspepsia          RADIOLOGY & ADDITIONAL TESTS:    4/23/19 : Xray Chest 1 View- PORTABLE-Urgent (04.23.19 @ 01:05) The lungs  are clear.  No pleural abnormality is seen.  The  heart is enlarged in transverse diameter. No hilar mass. Trachea  midline.  Visualized osseous structures are intact.      Procalcitonin, Serum (04.23.19 @ 15:25) Procalcitonin, Serum: 0.11: Test Repeated  Procalcitonin (PCT) Interpretation (ng/mL) - Diagnosis of systemic bacterial infection/sepsis        MICROBIOLOGY DATA:      Legionella pneumophila Antigen, Urine (04.24.19 @ 18:34)    Legionella Antigen, Urine: Negative      Culture - Blood (04.23.19 @ 18:40)    Specimen Source: .Blood    Culture Results:   No growth to date.        FLU A B RSV Detection by PCR (04.23.19 @ 07:36)    [facility]: The Flu A B RSV assay is a Real-Time PCR test for the qualitative  detection and differentiation of Influenza A, Influenza B, and  Respiratory Syncytial Virus on nasopharyngeal swabs. The results should  be interpreted in the context of all clinical and laboratory findings.    Flu B Result: [facility]    RSV Result: [facility]

## 2019-04-26 NOTE — PROGRESS NOTE ADULT - SUBJECTIVE AND OBJECTIVE BOX
PAWAN STALEY   [id number]   89y/Male    Patient is a 89y old  Male who presents with a chief complaint of Weakness (24 Apr 2019 09:06)                                                                                     INTERVAL HPI/OVERNIGHT EVENTS:      no new events overnight. patient pending auth for placement. daughter at bedside and would like to speak to CM regarding placement issues. CM aware        Patient seen and examined at bedside.  Denies chest pain, palpitations, SOB, nausea, vomiting, abdominal pain.    ICU Vital Signs Last 24 Hrs  T(C): 36.6 (26 Apr 2019 05:05), Max: 36.6 (26 Apr 2019 05:05)  T(F): 97.8 (26 Apr 2019 05:05), Max: 97.8 (26 Apr 2019 05:05)  HR: 69 (26 Apr 2019 05:05) (69 - 105)  BP: 117/62 (26 Apr 2019 05:05) (111/66 - 128/79)  BP(mean): --  ABP: --  ABP(mean): --  RR: 16 (26 Apr 2019 05:05) (16 - 17)  SpO2: 97% (26 Apr 2019 05:05) (94% - 99%)        REVIEW OF SYSTEMS:  No fever,   No cough, SOB, no wheezing  No chest pain, palpitations  No Abd pain, nausea, vomiting, No diarrhea or constipation                                                                                                    PHYSICAL EXAM    PHYSICAL EXAMINATION:    GENERAL: The patient is a well-developed, well-nourished in no apparent distress.   SKIN no rash ecchymoses or bruises  HEENT: Head is normocephalic and atraumatic  [place] , Extraocular muscles are intact. Mucous membranes  moist.   Neck supple ,No LN felt JVP not increased  Thyroid not enlarged  Cardiovascular:  S1 S2 heard, RSR, No JVD , systolic  murmur at apex, No gallop or rub  Respiratory: Chest wall symmetrical with good air entry ,Percussion note normal,    Lungs vesicular breathing with  no  rales, no wheezing today	  ABDOMEN:  Soft, Non-tender,  no hepatomegaly or splenomegaly BS positive	  Extremities: Normal range of motion, No clubbing, cyanosis or edema  Vascular: Peripheral pulses palpable 2+ bilaterally  CNS:  Alert and oriented x3                                                                                                           LABS:                        14.6   7.71  )-----------( 179      ( 24 Apr 2019 08:06 )             43.7     04-24    136  |  104  |  24<H>  ----------------------------<  87  3.6   |  24  |  1.09    Ca    8.3<L>      24 Apr 2019 08:06  Phos  2.9     04-23  Mg     2.0     04-23    TPro  6.8  /  Alb  3.3<L>  /  TBili  0.9  /  DBili  x   /  AST  41<H>  /  ALT  28  /  AlkPhos  72  04-23        CAPILLARY BLOOD GLUCOSE                CARDIAC MARKERS ( 24 Apr 2019 08:06 )  x     / x     / 1998 U/L / x     / x      CARDIAC MARKERS ( 23 Apr 2019 05:58 )  0.480 ng/mL / x     / 1839 U/L / x     / 2.0 ng/mL  CARDIAC MARKERS ( 22 Apr 2019 23:54 )  0.265 ng/mL / x     / x     / x     / x            Radiology:      EKG:

## 2019-04-26 NOTE — PROGRESS NOTE ADULT - ATTENDING COMMENTS
I have examined pt personally Hx chart lab and xrays reviewed and pt discussed with residents
I have examined pt personally Hx chart lab and xrays reviewed and pt discussed with residents
patient seen and examined alng with resident   above reviewed and agreed  pt ot  possible d/c in am if stable
patient was seen and examined along with resident   above discussed , reviewed and agreed  d/c planning
patient was seen and examined along with resident earlier   above discussed , reviewed and agreed   also spoke to DR chacon , insurance medical director to approve STR   it was declined   patient waiting to go back to adult home

## 2019-04-27 ENCOUNTER — TRANSCRIPTION ENCOUNTER (OUTPATIENT)
Age: 84
End: 2019-04-27

## 2019-04-27 VITALS — SYSTOLIC BLOOD PRESSURE: 145 MMHG | HEART RATE: 64 BPM | DIASTOLIC BLOOD PRESSURE: 69 MMHG

## 2019-04-27 PROCEDURE — 82553 CREATINE MB FRACTION: CPT

## 2019-04-27 PROCEDURE — 84436 ASSAY OF TOTAL THYROXINE: CPT

## 2019-04-27 PROCEDURE — 83735 ASSAY OF MAGNESIUM: CPT

## 2019-04-27 PROCEDURE — 87899 AGENT NOS ASSAY W/OPTIC: CPT

## 2019-04-27 PROCEDURE — 71045 X-RAY EXAM CHEST 1 VIEW: CPT

## 2019-04-27 PROCEDURE — 85027 COMPLETE CBC AUTOMATED: CPT

## 2019-04-27 PROCEDURE — 94640 AIRWAY INHALATION TREATMENT: CPT

## 2019-04-27 PROCEDURE — 83036 HEMOGLOBIN GLYCOSYLATED A1C: CPT

## 2019-04-27 PROCEDURE — 84484 ASSAY OF TROPONIN QUANT: CPT

## 2019-04-27 PROCEDURE — 82607 VITAMIN B-12: CPT

## 2019-04-27 PROCEDURE — 82550 ASSAY OF CK (CPK): CPT

## 2019-04-27 PROCEDURE — 82746 ASSAY OF FOLIC ACID SERUM: CPT

## 2019-04-27 PROCEDURE — 87449 NOS EACH ORGANISM AG IA: CPT

## 2019-04-27 PROCEDURE — 84145 PROCALCITONIN (PCT): CPT

## 2019-04-27 PROCEDURE — 80061 LIPID PANEL: CPT

## 2019-04-27 PROCEDURE — 36415 COLL VENOUS BLD VENIPUNCTURE: CPT

## 2019-04-27 PROCEDURE — 97110 THERAPEUTIC EXERCISES: CPT

## 2019-04-27 PROCEDURE — 97530 THERAPEUTIC ACTIVITIES: CPT

## 2019-04-27 PROCEDURE — 73080 X-RAY EXAM OF ELBOW: CPT

## 2019-04-27 PROCEDURE — 84443 ASSAY THYROID STIM HORMONE: CPT

## 2019-04-27 PROCEDURE — 80053 COMPREHEN METABOLIC PANEL: CPT

## 2019-04-27 PROCEDURE — 97161 PT EVAL LOW COMPLEX 20 MIN: CPT

## 2019-04-27 PROCEDURE — 99285 EMERGENCY DEPT VISIT HI MDM: CPT | Mod: 25

## 2019-04-27 PROCEDURE — 97116 GAIT TRAINING THERAPY: CPT

## 2019-04-27 PROCEDURE — 87631 RESP VIRUS 3-5 TARGETS: CPT

## 2019-04-27 PROCEDURE — 93005 ELECTROCARDIOGRAM TRACING: CPT

## 2019-04-27 PROCEDURE — 87040 BLOOD CULTURE FOR BACTERIA: CPT

## 2019-04-27 PROCEDURE — 84100 ASSAY OF PHOSPHORUS: CPT

## 2019-04-27 PROCEDURE — 80048 BASIC METABOLIC PNL TOTAL CA: CPT

## 2019-04-27 RX ORDER — GUAIFENESIN/DEXTROMETHORPHAN 600MG-30MG
10 TABLET, EXTENDED RELEASE 12 HR ORAL ONCE
Qty: 0 | Refills: 0 | Status: COMPLETED | OUTPATIENT
Start: 2019-04-27 | End: 2019-04-27

## 2019-04-27 RX ADMIN — AZITHROMYCIN 500 MILLIGRAM(S): 500 TABLET, FILM COATED ORAL at 12:31

## 2019-04-27 RX ADMIN — LISINOPRIL 10 MILLIGRAM(S): 2.5 TABLET ORAL at 12:32

## 2019-04-27 RX ADMIN — ENOXAPARIN SODIUM 40 MILLIGRAM(S): 100 INJECTION SUBCUTANEOUS at 12:31

## 2019-04-27 RX ADMIN — Medication 81 MILLIGRAM(S): at 12:31

## 2019-04-27 RX ADMIN — Medication 10 MILLILITER(S): at 01:44

## 2019-04-27 RX ADMIN — Medication 40 MILLIGRAM(S): at 05:29

## 2019-04-27 NOTE — DISCHARGE NOTE NURSING/CASE MANAGEMENT/SOCIAL WORK - NSDCDPATPORTLINK_GEN_ALL_CORE
You can access the [facility], offered by [facility], by registering with the following website: [email or web address]

## 2019-04-27 NOTE — CHART NOTE - NSCHARTNOTEFT_GEN_A_CORE
DC papers done, patient is getting discharged to facility today. CM made arrangements for discharge, d/w RN, patient will leave after lunch

## 2019-04-28 LAB
CULTURE RESULTS: SIGNIFICANT CHANGE UP
SPECIMEN SOURCE: SIGNIFICANT CHANGE UP

## 2019-12-24 NOTE — PROGRESS NOTE ADULT - PROBLEM SELECTOR PROBLEM 8
COPD (chronic obstructive pulmonary disease)
good balance

## 2020-01-24 ENCOUNTER — INPATIENT (INPATIENT)
Facility: HOSPITAL | Age: 85
LOS: 2 days | Discharge: NURSING, REHAB OR LONG-TERM CARE | DRG: 194 | End: 2020-01-27
Attending: INTERNAL MEDICINE | Admitting: INTERNAL MEDICINE
Payer: MEDICARE

## 2020-01-24 VITALS
SYSTOLIC BLOOD PRESSURE: 116 MMHG | HEIGHT: 67 IN | RESPIRATION RATE: 16 BRPM | OXYGEN SATURATION: 96 % | TEMPERATURE: 98 F | HEART RATE: 62 BPM | WEIGHT: 173.06 LBS | DIASTOLIC BLOOD PRESSURE: 72 MMHG

## 2020-01-24 DIAGNOSIS — R79.89 OTHER SPECIFIED ABNORMAL FINDINGS OF BLOOD CHEMISTRY: ICD-10-CM

## 2020-01-24 DIAGNOSIS — J18.9 PNEUMONIA, UNSPECIFIED ORGANISM: ICD-10-CM

## 2020-01-24 DIAGNOSIS — R53.1 WEAKNESS: ICD-10-CM

## 2020-01-24 DIAGNOSIS — N17.9 ACUTE KIDNEY FAILURE, UNSPECIFIED: ICD-10-CM

## 2020-01-24 DIAGNOSIS — E78.00 PURE HYPERCHOLESTEROLEMIA, UNSPECIFIED: ICD-10-CM

## 2020-01-24 DIAGNOSIS — N40.0 BENIGN PROSTATIC HYPERPLASIA WITHOUT LOWER URINARY TRACT SYMPTOMS: ICD-10-CM

## 2020-01-24 DIAGNOSIS — Z29.9 ENCOUNTER FOR PROPHYLACTIC MEASURES, UNSPECIFIED: ICD-10-CM

## 2020-01-24 DIAGNOSIS — Z87.09 PERSONAL HISTORY OF OTHER DISEASES OF THE RESPIRATORY SYSTEM: ICD-10-CM

## 2020-01-24 DIAGNOSIS — Z86.79 PERSONAL HISTORY OF OTHER DISEASES OF THE CIRCULATORY SYSTEM: ICD-10-CM

## 2020-01-24 LAB
ALBUMIN SERPL ELPH-MCNC: 3.3 G/DL — LOW (ref 3.5–5)
ALP SERPL-CCNC: 79 U/L — SIGNIFICANT CHANGE UP (ref 40–120)
ALT FLD-CCNC: 23 U/L DA — SIGNIFICANT CHANGE UP (ref 10–60)
ANION GAP SERPL CALC-SCNC: 4 MMOL/L — LOW (ref 5–17)
APPEARANCE UR: CLEAR — SIGNIFICANT CHANGE UP
APTT BLD: 35.4 SEC — SIGNIFICANT CHANGE UP (ref 27.5–36.3)
AST SERPL-CCNC: 22 U/L — SIGNIFICANT CHANGE UP (ref 10–40)
BASOPHILS # BLD AUTO: 0.03 K/UL — SIGNIFICANT CHANGE UP (ref 0–0.2)
BASOPHILS NFR BLD AUTO: 0.5 % — SIGNIFICANT CHANGE UP (ref 0–2)
BILIRUB SERPL-MCNC: 1.2 MG/DL — SIGNIFICANT CHANGE UP (ref 0.2–1.2)
BILIRUB UR-MCNC: NEGATIVE — SIGNIFICANT CHANGE UP
BUN SERPL-MCNC: 20 MG/DL — HIGH (ref 7–18)
CALCIUM SERPL-MCNC: 8.6 MG/DL — SIGNIFICANT CHANGE UP (ref 8.4–10.5)
CHLORIDE SERPL-SCNC: 109 MMOL/L — HIGH (ref 96–108)
CO2 SERPL-SCNC: 26 MMOL/L — SIGNIFICANT CHANGE UP (ref 22–31)
COLOR SPEC: YELLOW — SIGNIFICANT CHANGE UP
CREAT SERPL-MCNC: 1.34 MG/DL — HIGH (ref 0.5–1.3)
DIFF PNL FLD: NEGATIVE — SIGNIFICANT CHANGE UP
EOSINOPHIL # BLD AUTO: 0.1 K/UL — SIGNIFICANT CHANGE UP (ref 0–0.5)
EOSINOPHIL NFR BLD AUTO: 1.6 % — SIGNIFICANT CHANGE UP (ref 0–6)
FLU A RESULT: SIGNIFICANT CHANGE UP
FLU A RESULT: SIGNIFICANT CHANGE UP
FLUAV AG NPH QL: SIGNIFICANT CHANGE UP
FLUBV AG NPH QL: SIGNIFICANT CHANGE UP
GLUCOSE SERPL-MCNC: 108 MG/DL — HIGH (ref 70–99)
GLUCOSE UR QL: NEGATIVE — SIGNIFICANT CHANGE UP
HCT VFR BLD CALC: 41.8 % — SIGNIFICANT CHANGE UP (ref 39–50)
HGB BLD-MCNC: 14 G/DL — SIGNIFICANT CHANGE UP (ref 13–17)
IMM GRANULOCYTES NFR BLD AUTO: 0.3 % — SIGNIFICANT CHANGE UP (ref 0–1.5)
INR BLD: 1.24 RATIO — HIGH (ref 0.88–1.16)
KETONES UR-MCNC: NEGATIVE — SIGNIFICANT CHANGE UP
LACTATE SERPL-SCNC: 1.3 MMOL/L — SIGNIFICANT CHANGE UP (ref 0.7–2)
LEUKOCYTE ESTERASE UR-ACNC: NEGATIVE — SIGNIFICANT CHANGE UP
LYMPHOCYTES # BLD AUTO: 0.98 K/UL — LOW (ref 1–3.3)
LYMPHOCYTES # BLD AUTO: 15.7 % — SIGNIFICANT CHANGE UP (ref 13–44)
MCHC RBC-ENTMCNC: 32.9 PG — SIGNIFICANT CHANGE UP (ref 27–34)
MCHC RBC-ENTMCNC: 33.5 GM/DL — SIGNIFICANT CHANGE UP (ref 32–36)
MCV RBC AUTO: 98.4 FL — SIGNIFICANT CHANGE UP (ref 80–100)
MONOCYTES # BLD AUTO: 0.54 K/UL — SIGNIFICANT CHANGE UP (ref 0–0.9)
MONOCYTES NFR BLD AUTO: 8.6 % — SIGNIFICANT CHANGE UP (ref 2–14)
NEUTROPHILS # BLD AUTO: 4.59 K/UL — SIGNIFICANT CHANGE UP (ref 1.8–7.4)
NEUTROPHILS NFR BLD AUTO: 73.3 % — SIGNIFICANT CHANGE UP (ref 43–77)
NITRITE UR-MCNC: NEGATIVE — SIGNIFICANT CHANGE UP
NRBC # BLD: 0 /100 WBCS — SIGNIFICANT CHANGE UP (ref 0–0)
NT-PROBNP SERPL-SCNC: 2974 PG/ML — HIGH (ref 0–450)
PH UR: 7 — SIGNIFICANT CHANGE UP (ref 5–8)
PLATELET # BLD AUTO: 177 K/UL — SIGNIFICANT CHANGE UP (ref 150–400)
POTASSIUM SERPL-MCNC: 3.8 MMOL/L — SIGNIFICANT CHANGE UP (ref 3.5–5.3)
POTASSIUM SERPL-SCNC: 3.8 MMOL/L — SIGNIFICANT CHANGE UP (ref 3.5–5.3)
PROT SERPL-MCNC: 6.4 G/DL — SIGNIFICANT CHANGE UP (ref 6–8.3)
PROT UR-MCNC: 30 MG/DL
PROTHROM AB SERPL-ACNC: 13.8 SEC — HIGH (ref 10–12.9)
RBC # BLD: 4.25 M/UL — SIGNIFICANT CHANGE UP (ref 4.2–5.8)
RBC # FLD: 14.4 % — SIGNIFICANT CHANGE UP (ref 10.3–14.5)
RSV RESULT: SIGNIFICANT CHANGE UP
RSV RNA RESP QL NAA+PROBE: SIGNIFICANT CHANGE UP
SODIUM SERPL-SCNC: 139 MMOL/L — SIGNIFICANT CHANGE UP (ref 135–145)
SP GR SPEC: 1.01 — SIGNIFICANT CHANGE UP (ref 1.01–1.02)
TROPONIN I SERPL-MCNC: 0.1 NG/ML — HIGH (ref 0–0.04)
TROPONIN I SERPL-MCNC: 0.11 NG/ML — HIGH (ref 0–0.04)
UROBILINOGEN FLD QL: NEGATIVE — SIGNIFICANT CHANGE UP
WBC # BLD: 6.26 K/UL — SIGNIFICANT CHANGE UP (ref 3.8–10.5)
WBC # FLD AUTO: 6.26 K/UL — SIGNIFICANT CHANGE UP (ref 3.8–10.5)

## 2020-01-24 PROCEDURE — 71045 X-RAY EXAM CHEST 1 VIEW: CPT | Mod: 26

## 2020-01-24 PROCEDURE — 93010 ELECTROCARDIOGRAM REPORT: CPT

## 2020-01-24 PROCEDURE — 99285 EMERGENCY DEPT VISIT HI MDM: CPT

## 2020-01-24 RX ORDER — SODIUM CHLORIDE 9 MG/ML
2400 INJECTION INTRAMUSCULAR; INTRAVENOUS; SUBCUTANEOUS ONCE
Refills: 0 | Status: COMPLETED | OUTPATIENT
Start: 2020-01-24 | End: 2020-01-24

## 2020-01-24 RX ORDER — SODIUM CHLORIDE 9 MG/ML
2000 INJECTION INTRAMUSCULAR; INTRAVENOUS; SUBCUTANEOUS ONCE
Refills: 0 | Status: DISCONTINUED | OUTPATIENT
Start: 2020-01-24 | End: 2020-01-24

## 2020-01-24 RX ORDER — AZITHROMYCIN 500 MG/1
500 TABLET, FILM COATED ORAL ONCE
Refills: 0 | Status: COMPLETED | OUTPATIENT
Start: 2020-01-24 | End: 2020-01-24

## 2020-01-24 RX ORDER — METOPROLOL TARTRATE 50 MG
50 TABLET ORAL DAILY
Refills: 0 | Status: DISCONTINUED | OUTPATIENT
Start: 2020-01-24 | End: 2020-01-27

## 2020-01-24 RX ORDER — SIMVASTATIN 20 MG/1
10 TABLET, FILM COATED ORAL AT BEDTIME
Refills: 0 | Status: DISCONTINUED | OUTPATIENT
Start: 2020-01-24 | End: 2020-01-27

## 2020-01-24 RX ORDER — DOXAZOSIN MESYLATE 4 MG
4 TABLET ORAL AT BEDTIME
Refills: 0 | Status: DISCONTINUED | OUTPATIENT
Start: 2020-01-24 | End: 2020-01-27

## 2020-01-24 RX ORDER — CEFTRIAXONE 500 MG/1
1000 INJECTION, POWDER, FOR SOLUTION INTRAMUSCULAR; INTRAVENOUS ONCE
Refills: 0 | Status: COMPLETED | OUTPATIENT
Start: 2020-01-24 | End: 2020-01-24

## 2020-01-24 RX ORDER — ENOXAPARIN SODIUM 100 MG/ML
40 INJECTION SUBCUTANEOUS DAILY
Refills: 0 | Status: DISCONTINUED | OUTPATIENT
Start: 2020-01-24 | End: 2020-01-27

## 2020-01-24 RX ORDER — LUTEIN 20 MG
1 CAPSULE ORAL
Qty: 0 | Refills: 0 | DISCHARGE

## 2020-01-24 RX ORDER — ACETAMINOPHEN 500 MG
650 TABLET ORAL ONCE
Refills: 0 | Status: DISCONTINUED | OUTPATIENT
Start: 2020-01-24 | End: 2020-01-24

## 2020-01-24 RX ORDER — IPRATROPIUM/ALBUTEROL SULFATE 18-103MCG
3 AEROSOL WITH ADAPTER (GRAM) INHALATION EVERY 6 HOURS
Refills: 0 | Status: DISCONTINUED | OUTPATIENT
Start: 2020-01-24 | End: 2020-01-27

## 2020-01-24 RX ADMIN — Medication 4 MILLIGRAM(S): at 23:15

## 2020-01-24 RX ADMIN — SODIUM CHLORIDE 2400 MILLILITER(S): 9 INJECTION INTRAMUSCULAR; INTRAVENOUS; SUBCUTANEOUS at 15:51

## 2020-01-24 RX ADMIN — CEFTRIAXONE 1000 MILLIGRAM(S): 500 INJECTION, POWDER, FOR SOLUTION INTRAMUSCULAR; INTRAVENOUS at 18:20

## 2020-01-24 RX ADMIN — SIMVASTATIN 10 MILLIGRAM(S): 20 TABLET, FILM COATED ORAL at 23:16

## 2020-01-24 RX ADMIN — SODIUM CHLORIDE 1200 MILLILITER(S): 9 INJECTION INTRAMUSCULAR; INTRAVENOUS; SUBCUTANEOUS at 14:51

## 2020-01-24 RX ADMIN — AZITHROMYCIN 255 MILLIGRAM(S): 500 TABLET, FILM COATED ORAL at 18:18

## 2020-01-24 RX ADMIN — CEFTRIAXONE 100 MILLIGRAM(S): 500 INJECTION, POWDER, FOR SOLUTION INTRAMUSCULAR; INTRAVENOUS at 17:50

## 2020-01-24 NOTE — H&P ADULT - ASSESSMENT
89 y M from assisted living , walks with walker with MHx of HTN, BPH,latent TB and COPD and no significant PSHx presented to ED with Generalised weakness    Pt will be admitted to med for Weakness 89 y M from assisted living , walks with walker with MHx of HTN, BPH,latent TB and COPD and no significant PSHx presented to ED with Generalised weakness    Pt will be admitted to med for Weakness and elevated trop

## 2020-01-24 NOTE — H&P ADULT - NSHPPHYSICALEXAM_GEN_ALL_CORE
GENERAL: NAD  EYES: EOMI, PERRLA,   NECK: Supple, No JVD  CHEST/LUNG: mild scattered wheezing   HEART: Regular rate and rhythm; No murmurs, +ve S1 S2   ABDOMEN: Soft, Nontender, Nondistended; Bowel sounds present  NERVOUS SYSTEM:  Alert & Oriented X3, normal sensations and normal strength     EXTREMITIES:   No clubbing, cyanosis, or edema  LYMPH NODES : non palpable   PSYCH: normal mood and affect

## 2020-01-24 NOTE — H&P ADULT - NSICDXPASTMEDICALHX_GEN_ALL_CORE_FT
PAST MEDICAL HISTORY:  BPH (benign prostatic hyperplasia)     History of COPD     History of hypertension     Hypercholesterolemia

## 2020-01-24 NOTE — H&P ADULT - PROBLEM SELECTOR PLAN 8
IMPROVE VTE Individual Risk Assessment  RISK                                                                Points  [  ] Previous VTE                                                  3  [  ] Thrombophilia                                               2  [  ] Lower limb paralysis                                      2  [  ] Current Cancer                                              2         (within 6 months)  [  ] Immobilization > 24 hrs                                1  [  ] ICU/CCU stay > 24 hours                              1  [  ] Age > 60                                                      1  IMPROVE VTE Score ___2______  DVT ppx : lovenox  GI ppx : no need

## 2020-01-24 NOTE — H&P ADULT - PROBLEM SELECTOR PLAN 6
c/w simvastatin Pt on isosorbide 30 mg , lasix 20 and toprol 50 mg   - c/w toprol 50 mg for now   - start other meds if BP high

## 2020-01-24 NOTE — ED ADULT NURSE NOTE - NSIMPLEMENTINTERV_GEN_ALL_ED
Implemented All Fall with Harm Risk Interventions:  [place] to call system. Call bell, personal items and telephone within reach. Instruct patient to call for assistance. Room bathroom lighting operational. Non-slip footwear when patient is off stretcher. Physically safe environment: no spills, clutter or unnecessary equipment. Stretcher in lowest position, wheels locked, appropriate side rails in place. Provide visual cue, wrist band, yellow gown, etc. Monitor gait and stability. Monitor for mental status changes and reorient to person, place, and time. Review medications for side effects contributing to fall risk. Reinforce activity limits and safety measures with patient and family. Provide visual clues: red socks.

## 2020-01-24 NOTE — H&P ADULT - PROBLEM SELECTOR PLAN 4
Pt takes terazosin 5 mg at home   c/w doxazosin ( interchange) scattered wheezing on exam, pt not c/o SOB   - c/w duoneb prn

## 2020-01-24 NOTE — ED PROVIDER NOTE - CLINICAL SUMMARY MEDICAL DECISION MAKING FREE TEXT BOX
Pt now febrile, will do septic workup. Check for flu, possible viral illness. Will order labs, chest xray and reassess.

## 2020-01-24 NOTE — H&P ADULT - PROBLEM SELECTOR PLAN 3
scattered wheezing on exam, pt not c/o SOB   - c/w duoneb prn T1 0.096 , ECG : sinus rhythm with ocassional PVCs and PACs   - Pt denies any CP , likely demand ischemia   - f/u T2   - tele monitoring for now   - cont BB   - cardio consult Dr nieves

## 2020-01-24 NOTE — ED PROVIDER NOTE - CARE PLAN
Principal Discharge DX:	Pneumonia of right middle lobe due to infectious organism  Secondary Diagnosis:	Generalized weakness

## 2020-01-24 NOTE — ED PROVIDER NOTE - PMH
Mandy Yu Department Support Staff Signed  Date of Service: 07/28/2017 2:05 PM   Copy       Left message for patient at  [phone] (home) to schedule procedure.  Patient to return call to [phone].                           Mihaela Kelsey LPN Licensed Practical Nurse Signed  Date of Service: 07/28/2017 1:53 PM   Copy    Expand All Collapse All    Patient's chart reviewed, please contact to schedule OA colonoscopy with .Patient Preference     Screening Criteria  Age: 52 year old Patient meets age criteria.  PCP:  Geovanna Batista, DO  Personal H/O Colon CA or Polyps: Patient does NOT have a personal history of colon polyps or colon cancer  Family H/O Colon CA: No family history of colon cancer.  GI Symptoms: No  Most recent colon procedure No prior Flexi or Colonoscopy  Concerns for taking prep at home(mobility, etc): No          ALLERGIES:   Allergen Reactions   • Nsaids HIVES         Medications:        Current Medications     CALCIUM CARBONATE (CALCIUM 600 PO)    Take  by mouth.     CHOLECALCIFEROL (VITAMIN D PO)    Take  by mouth.     FERROUS SULFATE PO    Take  by mouth.     MULTIPLE VITAMINS-MINERALS (ANTIOXIDANT PO)    Take  by mouth.     MULTIPLE VITAMINS-MINERALS (MULTIVITAMIN PO)    Take  by mouth.         Anticoagulation (examples - coumadin, heparin, lovenox, xarelto, pradaxa): No  Platelet Modifying (examples - Plavix, aspirin, nsaids, Aggrenox) : No  Concerns for sedation. On Chronic long acting narcotics, Methadone, Suboxone, antianxiety like xanax, klonazepam: No  Review of other medications indicate - no concern   BMI: Estimated body mass index is 27.86 kg/m² as calculated from the following:    Height as of an earlier encounter on 7/28/17: 5' 6\" (1.676 m).    Weight as of an earlier encounter on 7/28/17: 78.3 kg.:more than 45 No  Is the patient over 300 lbs Weight:       Wt Readings from Last 1 Encounters:   07/28/17 78.3 kg   :  No  On Oxygen:  No     Past Medical History:    History - past medical         Past Medical History:   Diagnosis Date   • Vaginal prolapse without mention of uterine prolapse              Past Surgical History:   History - past surgical          Past Surgical History:   Procedure Laterality Date   • TONSILLECTOMY AND ADENOIDECTOMY                Other Concerns: none noted     Prior Labs: If abnormal creatinine/electrolytes, then will need Nulytely prep        Creatinine   Date Value Ref Range Status   05/30/2013 0.60 0.50 - 1.10 mg/dL Final            BUN   Date Value Ref Range Status   05/30/2013 14 10 - 20 mg/dL Final            Sodium   Date Value Ref Range Status   05/30/2013 140 135 - 145 mmol/L Final            Potassium   Date Value Ref Range Status   05/30/2013 4.4 3.4 - 5.1 mmol/L Final            Chloride   Date Value Ref Range Status   05/30/2013 105 98 - 107 mmol/L Final            Carbon Dioxide   Date Value Ref Range Status   05/30/2013 27 21 - 32 mmol/L Final            Glucose   Date Value Ref Range Status   05/30/2013 92 65 - 99 mg/dL Final            CALCIUM   Date Value Ref Range Status   05/30/2013 8.7 8.4 - 10.2 mg/dL Final         SCHEDULING:  OK to schedule open access colonoscopy, if no then will need office visit: Yes  Physician Scheduled with: Patient Preference  Diagnosis code from O/A order:  Colon Cancer Screening Z12.11  Location: Patient Preference  Sedation: Moderate sedation    Prep: Physician Preference                 BPH (benign prostatic hyperplasia)    History of hypertension    Hypercholesterolemia

## 2020-01-24 NOTE — H&P ADULT - PROBLEM SELECTOR PLAN 5
Pt on isosorbide 30 mg , lasix 20 and toprol 50 mg   - c/w toprol 50 mg for now   - start other meds if BP high Pt takes terazosin 5 mg at home   c/w doxazosin ( interchange)

## 2020-01-24 NOTE — H&P ADULT - PROBLEM SELECTOR PLAN 7
IMPROVE VTE Individual Risk Assessment  RISK                                                                Points  [  ] Previous VTE                                                  3  [  ] Thrombophilia                                               2  [  ] Lower limb paralysis                                      2  [  ] Current Cancer                                              2         (within 6 months)  [  ] Immobilization > 24 hrs                                1  [  ] ICU/CCU stay > 24 hours                              1  [  ] Age > 60                                                      1  IMPROVE VTE Score ___2______  DVT ppx : lovenox  GI ppx : no need c/w simvastatin

## 2020-01-24 NOTE — ED PROVIDER NOTE - OBJECTIVE STATEMENT
Pt is a 89y M with significant PMHx of BPH, htn, hypercholesterolemia and no significant PSHx presenting to the ED with congestion, increasing lethargy and states he is not feeling well since yesterday. Pt denies any fever but was given Tylenol for a 99F temperature in his assisted living facility. Pt denies any nausea, vomiting or any other specific sick contacts. Pt has NKDA and currently denies any additional complaints at this time.

## 2020-01-24 NOTE — H&P ADULT - HISTORY OF PRESENT ILLNESS
89 y M from assisted living , walks with walker with MHx of HTN, BPH,latent TB and COPD and no significant PSHx presented to ED with Generalised weakness since yesterday. Pt states he is feeling weak overall with dec appetite since yesterday but denies any fever,chills, SOB,CP , N/V/D/C ,abd pain , any dysuria ,leg swelling or any other complaints. Pt says he has chronic cough after the Pneumonia last yr.     ED course :   Vitals : stable   Labs : unremarkable except for Cr 1.34   Flu -ve   UA -ve   CXR : Mild pul vascular congestion and interstitial infiltrates . CXR was done after pt got 2L NS and 1 dose of rocephin and zithromax    SH : Former smoker ,smoked 1 PPD x 40 yrs . Drinks ocassionally . Denies any drug use     GOC : Pt doesnt want CPR/intubation . Says he filled the form long time back 89 y M from assisted living , walks with walker with MHx of HTN, BPH,latent TB and COPD and no significant PSHx presented to ED with Generalised weakness since yesterday. Pt states he is feeling weak overall with dec appetite since yesterday but denies any fever,chills, SOB,CP , N/V/D/C ,abd pain , any dysuria ,leg swelling or any other complaints. Pt says he has chronic cough after the Pneumonia last yr.     ED course :   Vitals : stable   Labs : unremarkable except for Cr 1.34 , T1 0.096   Flu -ve   UA -ve   CXR : Mild pul vascular congestion and interstitial infiltrates . CXR was done after pt got 2L NS and 1 dose of rocephin and zithromax    SH : Former smoker ,smoked 1 PPD x 40 yrs . Drinks ocassionally . Denies any drug use     GOC : Pt doesnt want CPR/intubation . Says he filled the form long time back

## 2020-01-24 NOTE — ED PROVIDER NOTE - PROGRESS NOTE DETAILS
Willett:  Pt received in signout from Dr. Palomino, for reassessment.  Pt with infiltrates on CXR, questionable PNA--will give ABx.  Pt still feels weak, but slightly better, wants to be admitted.  -Informed Dr. NESTOR Castelan for PMD Dr. Quan Hager.  Pulm is CHARLIE Chambers and cardio is [place].

## 2020-01-24 NOTE — H&P ADULT - PROBLEM SELECTOR PLAN 1
pt came with generalised weakness and dec appetite , no SOB  - UA -ve , flu -ve , afebrile, no wbc count   -CXR : mild vascular congestion and interstitial infiltrates   - s/p 2 L NS and 1 dose of rocephin and azithro in ED   - will hold IVF and abx for now   - f/u CT chest   - If congestion on CT ,will give Lasix. If infiltrate . then cont with Abx   - tylenol prn   - Will check Vit B12 , Vit D, TSH

## 2020-01-25 LAB
24R-OH-CALCIDIOL SERPL-MCNC: 54.8 NG/ML — SIGNIFICANT CHANGE UP (ref 30–80)
ANION GAP SERPL CALC-SCNC: 7 MMOL/L — SIGNIFICANT CHANGE UP (ref 5–17)
BASOPHILS # BLD AUTO: 0.02 K/UL — SIGNIFICANT CHANGE UP (ref 0–0.2)
BASOPHILS NFR BLD AUTO: 0.3 % — SIGNIFICANT CHANGE UP (ref 0–2)
BUN SERPL-MCNC: 16 MG/DL — SIGNIFICANT CHANGE UP (ref 7–18)
CALCIUM SERPL-MCNC: 8.2 MG/DL — LOW (ref 8.4–10.5)
CHLORIDE SERPL-SCNC: 109 MMOL/L — HIGH (ref 96–108)
CHOLEST SERPL-MCNC: 108 MG/DL — SIGNIFICANT CHANGE UP (ref 10–199)
CO2 SERPL-SCNC: 24 MMOL/L — SIGNIFICANT CHANGE UP (ref 22–31)
CREAT SERPL-MCNC: 1 MG/DL — SIGNIFICANT CHANGE UP (ref 0.5–1.3)
CULTURE RESULTS: SIGNIFICANT CHANGE UP
EOSINOPHIL # BLD AUTO: 0.15 K/UL — SIGNIFICANT CHANGE UP (ref 0–0.5)
EOSINOPHIL NFR BLD AUTO: 2.1 % — SIGNIFICANT CHANGE UP (ref 0–6)
GLUCOSE SERPL-MCNC: 85 MG/DL — SIGNIFICANT CHANGE UP (ref 70–99)
HBA1C BLD-MCNC: 5.5 % — SIGNIFICANT CHANGE UP (ref 4–5.6)
HCT VFR BLD CALC: 41.9 % — SIGNIFICANT CHANGE UP (ref 39–50)
HDLC SERPL-MCNC: 60 MG/DL — SIGNIFICANT CHANGE UP
HGB BLD-MCNC: 14.1 G/DL — SIGNIFICANT CHANGE UP (ref 13–17)
IMM GRANULOCYTES NFR BLD AUTO: 0.5 % — SIGNIFICANT CHANGE UP (ref 0–1.5)
LIPID PNL WITH DIRECT LDL SERPL: 39 MG/DL — SIGNIFICANT CHANGE UP
LYMPHOCYTES # BLD AUTO: 1.29 K/UL — SIGNIFICANT CHANGE UP (ref 1–3.3)
LYMPHOCYTES # BLD AUTO: 17.7 % — SIGNIFICANT CHANGE UP (ref 13–44)
MAGNESIUM SERPL-MCNC: 1.9 MG/DL — SIGNIFICANT CHANGE UP (ref 1.6–2.6)
MCHC RBC-ENTMCNC: 32.7 PG — SIGNIFICANT CHANGE UP (ref 27–34)
MCHC RBC-ENTMCNC: 33.7 GM/DL — SIGNIFICANT CHANGE UP (ref 32–36)
MCV RBC AUTO: 97.2 FL — SIGNIFICANT CHANGE UP (ref 80–100)
MONOCYTES # BLD AUTO: 0.63 K/UL — SIGNIFICANT CHANGE UP (ref 0–0.9)
MONOCYTES NFR BLD AUTO: 8.6 % — SIGNIFICANT CHANGE UP (ref 2–14)
NEUTROPHILS # BLD AUTO: 5.16 K/UL — SIGNIFICANT CHANGE UP (ref 1.8–7.4)
NEUTROPHILS NFR BLD AUTO: 70.8 % — SIGNIFICANT CHANGE UP (ref 43–77)
NRBC # BLD: 0 /100 WBCS — SIGNIFICANT CHANGE UP (ref 0–0)
PHOSPHATE SERPL-MCNC: 1.7 MG/DL — LOW (ref 2.5–4.5)
PLATELET # BLD AUTO: 165 K/UL — SIGNIFICANT CHANGE UP (ref 150–400)
POTASSIUM SERPL-MCNC: 3.9 MMOL/L — SIGNIFICANT CHANGE UP (ref 3.5–5.3)
POTASSIUM SERPL-SCNC: 3.9 MMOL/L — SIGNIFICANT CHANGE UP (ref 3.5–5.3)
RBC # BLD: 4.31 M/UL — SIGNIFICANT CHANGE UP (ref 4.2–5.8)
RBC # FLD: 14.1 % — SIGNIFICANT CHANGE UP (ref 10.3–14.5)
SODIUM SERPL-SCNC: 140 MMOL/L — SIGNIFICANT CHANGE UP (ref 135–145)
SPECIMEN SOURCE: SIGNIFICANT CHANGE UP
TOTAL CHOLESTEROL/HDL RATIO MEASUREMENT: 1.8 RATIO — LOW (ref 3.4–9.6)
TRIGL SERPL-MCNC: 45 MG/DL — SIGNIFICANT CHANGE UP (ref 10–149)
TROPONIN I SERPL-MCNC: 0.15 NG/ML — HIGH (ref 0–0.04)
TROPONIN I SERPL-MCNC: 0.15 NG/ML — HIGH (ref 0–0.04)
TSH SERPL-MCNC: 0.49 UU/ML — SIGNIFICANT CHANGE UP (ref 0.34–4.82)
VIT B12 SERPL-MCNC: 429 PG/ML — SIGNIFICANT CHANGE UP (ref 232–1245)
WBC # BLD: 7.29 K/UL — SIGNIFICANT CHANGE UP (ref 3.8–10.5)
WBC # FLD AUTO: 7.29 K/UL — SIGNIFICANT CHANGE UP (ref 3.8–10.5)

## 2020-01-25 PROCEDURE — 71250 CT THORAX DX C-: CPT | Mod: 26

## 2020-01-25 RX ORDER — FUROSEMIDE 40 MG
1 TABLET ORAL
Qty: 0 | Refills: 0 | DISCHARGE

## 2020-01-25 RX ORDER — CEFTRIAXONE 500 MG/1
1000 INJECTION, POWDER, FOR SOLUTION INTRAMUSCULAR; INTRAVENOUS EVERY 24 HOURS
Refills: 0 | Status: DISCONTINUED | OUTPATIENT
Start: 2020-01-25 | End: 2020-01-27

## 2020-01-25 RX ORDER — MAGNESIUM SULFATE 500 MG/ML
1 VIAL (ML) INJECTION ONCE
Refills: 0 | Status: DISCONTINUED | OUTPATIENT
Start: 2020-01-25 | End: 2020-01-25

## 2020-01-25 RX ORDER — FUROSEMIDE 40 MG
40 TABLET ORAL EVERY 24 HOURS
Refills: 0 | Status: DISCONTINUED | OUTPATIENT
Start: 2020-01-25 | End: 2020-01-27

## 2020-01-25 RX ORDER — AZITHROMYCIN 500 MG/1
500 TABLET, FILM COATED ORAL ONCE
Refills: 0 | Status: COMPLETED | OUTPATIENT
Start: 2020-01-25 | End: 2020-01-25

## 2020-01-25 RX ORDER — POTASSIUM PHOSPHATE, MONOBASIC POTASSIUM PHOSPHATE, DIBASIC 236; 224 MG/ML; MG/ML
15 INJECTION, SOLUTION INTRAVENOUS ONCE
Refills: 0 | Status: COMPLETED | OUTPATIENT
Start: 2020-01-25 | End: 2020-01-25

## 2020-01-25 RX ORDER — AZITHROMYCIN 500 MG/1
TABLET, FILM COATED ORAL
Refills: 0 | Status: DISCONTINUED | OUTPATIENT
Start: 2020-01-25 | End: 2020-01-27

## 2020-01-25 RX ORDER — AZITHROMYCIN 500 MG/1
500 TABLET, FILM COATED ORAL EVERY 24 HOURS
Refills: 0 | Status: DISCONTINUED | OUTPATIENT
Start: 2020-01-26 | End: 2020-01-27

## 2020-01-25 RX ORDER — METOPROLOL TARTRATE 50 MG
1 TABLET ORAL
Qty: 0 | Refills: 0 | DISCHARGE

## 2020-01-25 RX ORDER — PANTOPRAZOLE SODIUM 20 MG/1
40 TABLET, DELAYED RELEASE ORAL
Refills: 0 | Status: DISCONTINUED | OUTPATIENT
Start: 2020-01-25 | End: 2020-01-27

## 2020-01-25 RX ORDER — MAGNESIUM SULFATE 500 MG/ML
1 VIAL (ML) INJECTION ONCE
Refills: 0 | Status: COMPLETED | OUTPATIENT
Start: 2020-01-25 | End: 2020-01-25

## 2020-01-25 RX ADMIN — SIMVASTATIN 10 MILLIGRAM(S): 20 TABLET, FILM COATED ORAL at 21:08

## 2020-01-25 RX ADMIN — Medication 40 MILLIGRAM(S): at 11:29

## 2020-01-25 RX ADMIN — ENOXAPARIN SODIUM 40 MILLIGRAM(S): 100 INJECTION SUBCUTANEOUS at 11:29

## 2020-01-25 RX ADMIN — Medication 50 MILLIGRAM(S): at 06:29

## 2020-01-25 RX ADMIN — CEFTRIAXONE 100 MILLIGRAM(S): 500 INJECTION, POWDER, FOR SOLUTION INTRAMUSCULAR; INTRAVENOUS at 11:51

## 2020-01-25 RX ADMIN — Medication 100 GRAM(S): at 17:22

## 2020-01-25 RX ADMIN — POTASSIUM PHOSPHATE, MONOBASIC POTASSIUM PHOSPHATE, DIBASIC 62.5 MILLIMOLE(S): 236; 224 INJECTION, SOLUTION INTRAVENOUS at 17:22

## 2020-01-25 RX ADMIN — AZITHROMYCIN 255 MILLIGRAM(S): 500 TABLET, FILM COATED ORAL at 11:51

## 2020-01-25 NOTE — PROGRESS NOTE ADULT - PROBLEM SELECTOR PLAN 1
pt came with generalised weakness and dec appetite , no SOB  - UA -ve , flu -ve , afebrile, no wbc count   -CXR : mild vascular congestion and interstitial infiltrates   - s/p 2 L NS and 1 dose of rocephin and azithro in ED   - will hold IVF and abx for now   - CT chest shows bronchopneumonia   - will start azithro and rocephin   - tylenol prn   - Will check Vit B12 , Vit D, TSH

## 2020-01-25 NOTE — PROGRESS NOTE ADULT - ASSESSMENT
89 y M from assisted living , walks with walker with MHx of HTN, BPH,latent TB and COPD and no significant PSHx presented to ED with Generalised weakness    Pt will be admitted to med for Weakness and elevated trop

## 2020-01-25 NOTE — PROGRESS NOTE ADULT - SUBJECTIVE AND OBJECTIVE BOX
PGY 1 Note discussed with supervising resident and primary attending    Patient is a 89y old  Male who presents with a chief complaint of weakness (2020 19:57)      INTERVAL HPI/OVERNIGHT EVENTS: offers no new complaints; current symptoms resolving    MEDICATIONS  (STANDING):  azithromycin  IVPB      cefTRIAXone   IVPB 1000 milliGRAM(s) IV Intermittent every 24 hours  doxazosin 4 milliGRAM(s) Oral at bedtime  enoxaparin Injectable 40 milliGRAM(s) SubCutaneous daily  furosemide   Injectable 40 milliGRAM(s) IV Push every 24 hours  magnesium sulfate  IVPB 1 Gram(s) IV Intermittent once  metoprolol succinate ER 50 milliGRAM(s) Oral daily  pantoprazole    Tablet 40 milliGRAM(s) Oral before breakfast  potassium phosphate IVPB 15 milliMole(s) IV Intermittent once  simvastatin 10 milliGRAM(s) Oral at bedtime    MEDICATIONS  (PRN):  albuterol/ipratropium for Nebulization. 3 milliLiter(s) Nebulizer every 6 hours PRN Shortness of Breath and/or Wheezing      __________________________________________________  REVIEW OF SYSTEMS:    CONSTITUTIONAL: No fever,   EYES: no acute visual disturbances  NECK: No pain or stiffness  RESPIRATORY: No cough; No shortness of breath  CARDIOVASCULAR: No chest pain, no palpitations  GASTROINTESTINAL: No pain. No nausea or vomiting; No diarrhea   NEUROLOGICAL: No headache or numbness, no tremors  MUSCULOSKELETAL: No joint pain, no muscle pain  GENITOURINARY: no dysuria, no frequency, no hesitancy  PSYCHIATRY: no depression , no anxiety  ALL OTHER  ROS negative        Vital Signs Last 24 Hrs  T(C): 37.1 (2020 11:06), Max: 38.1 (2020 13:37)  T(F): 98.7 (2020 11:06), Max: 100.6 (2020 13:37)  HR: 77 (2020 11:06) (62 - 102)  BP: 125/82 (2020 11:06) (116/52 - 162/79)  BP(mean): 87 (2020 21:30) (87 - 87)  RR: 18 (2020 11:06) (16 - 19)  SpO2: 98% (2020 11:06) (95% - 100%)    ________________________________________________  PHYSICAL EXAM:  GENERAL: NAD  HEENT: Normocephalic;  conjunctivae and sclerae clear; moist mucous membranes;   NECK : supple  CHEST/LUNG: Clear to auscultation bilaterally with good air entry   HEART: S1 S2  regular; no murmurs, gallops or rubs  ABDOMEN: Soft, Nontender, Nondistended; Bowel sounds present  EXTREMITIES: no cyanosis; no edema; no calf tenderness  SKIN: warm and dry; no rash  NERVOUS SYSTEM:  Awake and alert; Oriented  to place, person and time ; no new deficits    _________________________________________________  LABS:                        14.1   7.29  )-----------( 165      ( 2020 07:16 )             41.9     01-25    140  |  109<H>  |  16  ----------------------------<  85  3.9   |  24  |  1.00    Ca    8.2<L>      2020 07:16  Phos  1.7     01-25  Mg     1.9     -25    TPro  6.4  /  Alb  3.3<L>  /  TBili  1.2  /  DBili  x   /  AST  22  /  ALT  23  /  AlkPhos  79  01-24    PT/INR - ( 2020 14:58 )   PT: 13.8 sec;   INR: 1.24 ratio         PTT - ( 2020 14:58 )  PTT:35.4 sec  Urinalysis Basic - ( 2020 16:52 )    Color: Yellow / Appearance: Clear / S.010 / pH: x  Gluc: x / Ketone: Negative  / Bili: Negative / Urobili: Negative   Blood: x / Protein: 30 mg/dL / Nitrite: Negative   Leuk Esterase: Negative / RBC: x / WBC 3-5 /HPF   Sq Epi: x / Non Sq Epi: Few /HPF / Bacteria: Trace /HPF      CAPILLARY BLOOD GLUCOSE            RADIOLOGY & ADDITIONAL TESTS:    Imaging Personally Reviewed:  YES/NO    Consultant(s) Notes Reviewed:   YES/ No    Care Discussed with Consultants :     Plan of care was discussed with patient and /or primary care giver; all questions and concerns were addressed and care was aligned with patient's wishes.

## 2020-01-25 NOTE — PATIENT PROFILE ADULT - ANY IMPAIRED UPPER EXTREMITY FUNCTION WITHIN A WEEK PRIOR TO ADMISSION RELATED TO?
Patient reports his  Mom told him he had tonsil stones and so patient took pliers and removed the white 'pods' from his throat. He reports that they were yellow-whiteish and squishy. He threw them away. He has had a sore throat for 3 days and his throat is still bothering him today.    Patient is accompanied by dad    Patient would like communication of their results via:        Cell Phone:   Telephone Information:   Mobile [phone]     Okay to leave a message containing results? Yes    Patient wearing mask,caregiver and family not wearing masks     no

## 2020-01-25 NOTE — PROGRESS NOTE ADULT - PROBLEM SELECTOR PLAN 3
#T1 0.096 , ECG : sinus rhythm with ocassional PVCs and PACs   - Pt denies any CP , likely demand ischemia   -t2 .112 ->.149  - tele monitoring for now   - cont BB   - cardio consult Dr nieves

## 2020-01-25 NOTE — PROGRESS NOTE ADULT - PROBLEM SELECTOR PLAN 6
Secondary to LMCA  Neuro Checks Q1 hr   Pt on isosorbide 30 mg , lasix 20 and toprol 50 mg   - c/w toprol 50 mg for now   - start other meds if BP high

## 2020-01-26 LAB
ANION GAP SERPL CALC-SCNC: 6 MMOL/L — SIGNIFICANT CHANGE UP (ref 5–17)
BASOPHILS # BLD AUTO: 0.03 K/UL — SIGNIFICANT CHANGE UP (ref 0–0.2)
BASOPHILS NFR BLD AUTO: 0.5 % — SIGNIFICANT CHANGE UP (ref 0–2)
BUN SERPL-MCNC: 15 MG/DL — SIGNIFICANT CHANGE UP (ref 7–18)
CALCIUM SERPL-MCNC: 8.8 MG/DL — SIGNIFICANT CHANGE UP (ref 8.4–10.5)
CHLORIDE SERPL-SCNC: 104 MMOL/L — SIGNIFICANT CHANGE UP (ref 96–108)
CO2 SERPL-SCNC: 28 MMOL/L — SIGNIFICANT CHANGE UP (ref 22–31)
CREAT SERPL-MCNC: 1.07 MG/DL — SIGNIFICANT CHANGE UP (ref 0.5–1.3)
EOSINOPHIL # BLD AUTO: 0.22 K/UL — SIGNIFICANT CHANGE UP (ref 0–0.5)
EOSINOPHIL NFR BLD AUTO: 3.5 % — SIGNIFICANT CHANGE UP (ref 0–6)
GLUCOSE SERPL-MCNC: 89 MG/DL — SIGNIFICANT CHANGE UP (ref 70–99)
HCT VFR BLD CALC: 41.7 % — SIGNIFICANT CHANGE UP (ref 39–50)
HGB BLD-MCNC: 14 G/DL — SIGNIFICANT CHANGE UP (ref 13–17)
IMM GRANULOCYTES NFR BLD AUTO: 0.5 % — SIGNIFICANT CHANGE UP (ref 0–1.5)
LYMPHOCYTES # BLD AUTO: 1.4 K/UL — SIGNIFICANT CHANGE UP (ref 1–3.3)
LYMPHOCYTES # BLD AUTO: 22.2 % — SIGNIFICANT CHANGE UP (ref 13–44)
MCHC RBC-ENTMCNC: 32.3 PG — SIGNIFICANT CHANGE UP (ref 27–34)
MCHC RBC-ENTMCNC: 33.6 GM/DL — SIGNIFICANT CHANGE UP (ref 32–36)
MCV RBC AUTO: 96.1 FL — SIGNIFICANT CHANGE UP (ref 80–100)
MONOCYTES # BLD AUTO: 0.81 K/UL — SIGNIFICANT CHANGE UP (ref 0–0.9)
MONOCYTES NFR BLD AUTO: 12.9 % — SIGNIFICANT CHANGE UP (ref 2–14)
NEUTROPHILS # BLD AUTO: 3.81 K/UL — SIGNIFICANT CHANGE UP (ref 1.8–7.4)
NEUTROPHILS NFR BLD AUTO: 60.4 % — SIGNIFICANT CHANGE UP (ref 43–77)
NRBC # BLD: 0 /100 WBCS — SIGNIFICANT CHANGE UP (ref 0–0)
PLATELET # BLD AUTO: 170 K/UL — SIGNIFICANT CHANGE UP (ref 150–400)
POTASSIUM SERPL-MCNC: 4.1 MMOL/L — SIGNIFICANT CHANGE UP (ref 3.5–5.3)
POTASSIUM SERPL-SCNC: 4.1 MMOL/L — SIGNIFICANT CHANGE UP (ref 3.5–5.3)
RBC # BLD: 4.34 M/UL — SIGNIFICANT CHANGE UP (ref 4.2–5.8)
RBC # FLD: 14.2 % — SIGNIFICANT CHANGE UP (ref 10.3–14.5)
SODIUM SERPL-SCNC: 138 MMOL/L — SIGNIFICANT CHANGE UP (ref 135–145)
WBC # BLD: 6.3 K/UL — SIGNIFICANT CHANGE UP (ref 3.8–10.5)
WBC # FLD AUTO: 6.3 K/UL — SIGNIFICANT CHANGE UP (ref 3.8–10.5)

## 2020-01-26 RX ADMIN — ENOXAPARIN SODIUM 40 MILLIGRAM(S): 100 INJECTION SUBCUTANEOUS at 11:44

## 2020-01-26 RX ADMIN — CEFTRIAXONE 100 MILLIGRAM(S): 500 INJECTION, POWDER, FOR SOLUTION INTRAMUSCULAR; INTRAVENOUS at 09:56

## 2020-01-26 RX ADMIN — PANTOPRAZOLE SODIUM 40 MILLIGRAM(S): 20 TABLET, DELAYED RELEASE ORAL at 05:15

## 2020-01-26 RX ADMIN — Medication 40 MILLIGRAM(S): at 11:45

## 2020-01-26 RX ADMIN — Medication 50 MILLIGRAM(S): at 05:15

## 2020-01-26 RX ADMIN — SIMVASTATIN 10 MILLIGRAM(S): 20 TABLET, FILM COATED ORAL at 21:34

## 2020-01-26 RX ADMIN — AZITHROMYCIN 255 MILLIGRAM(S): 500 TABLET, FILM COATED ORAL at 09:56

## 2020-01-26 NOTE — PHYSICAL THERAPY INITIAL EVALUATION ADULT - ADDITIONAL COMMENTS
Patient lives an assisted living facility.  Reports he ambulates independently with a rollator in the facility.  Utilizes a cane when going out with family.  Reports he is independent with all ADLs.

## 2020-01-26 NOTE — PROGRESS NOTE ADULT - PROBLEM SELECTOR PLAN 6
Pt on isosorbide 30 mg , lasix 20 and toprol 50 mg   - c/w toprol 50 mg for now   - start other meds if BP high

## 2020-01-26 NOTE — PROGRESS NOTE ADULT - PROBLEM SELECTOR PLAN 3
T1 0.096 , ECG : sinus rhythm with ocassional PVCs and PACs   - Pt denies any CP , likely demand ischemia   -t2 .112 ->.149  - tele monitoring for now   - cont BB   - cardio consult Dr nieves

## 2020-01-26 NOTE — PROGRESS NOTE ADULT - SUBJECTIVE AND OBJECTIVE BOX
Patient is a 89y old  Male who presents with a chief complaint of weakness (2020 19:57)      INTERVAL HPI/OVERNIGHT EVENTS: offers no new complaints; current symptoms resolving  MEDICATIONS  (STANDING):  azithromycin  IVPB 500 milliGRAM(s) IV Intermittent every 24 hours  azithromycin  IVPB      cefTRIAXone   IVPB 1000 milliGRAM(s) IV Intermittent every 24 hours  doxazosin 4 milliGRAM(s) Oral at bedtime  enoxaparin Injectable 40 milliGRAM(s) SubCutaneous daily  furosemide   Injectable 40 milliGRAM(s) IV Push every 24 hours  metoprolol succinate ER 50 milliGRAM(s) Oral daily  pantoprazole    Tablet 40 milliGRAM(s) Oral before breakfast  simvastatin 10 milliGRAM(s) Oral at bedtime    MEDICATIONS  (PRN):  albuterol/ipratropium for Nebulization. 3 milliLiter(s) Nebulizer every 6 hours PRN Shortness of Breath and/or Wheezing    __________________________________________________  REVIEW OF SYSTEMS:    CONSTITUTIONAL: No fever,   EYES: no acute visual disturbances  NECK: No pain or stiffness  RESPIRATORY: No cough; No shortness of breath  CARDIOVASCULAR: No chest pain, no palpitations  GASTROINTESTINAL: No pain. No nausea or vomiting; No diarrhea   NEUROLOGICAL: No headache or numbness, no tremors  MUSCULOSKELETAL: No joint pain, no muscle pain  GENITOURINARY: no dysuria, no frequency, no hesitancy  PSYCHIATRY: no depression , no anxiety  ALL OTHER  ROS negative      Vital Signs Last 24 Hrs  T(C): 36.4 (2020 16:02), Max: 37 (2020 07:35)  T(F): 97.6 (2020 16:02), Max: 98.6 (2020 07:35)  HR: 63 (2020 16:02) (63 - 77)  BP: 135/79 (2020 16:02) (104/62 - 144/78)  BP(mean): --  RR: 18 (2020 16:02) (18 - 19)  SpO2: 98% (2020 16:02) (95% - 98%)    ________________________________________________  PHYSICAL EXAM:  GENERAL: NAD  HEENT: Normocephalic;  conjunctivae and sclerae clear; moist mucous membranes;   NECK : supple  CHEST/LUNG: Clear to auscultation bilaterally with good air entry   HEART: S1 S2  regular; no murmurs, gallops or rubs  ABDOMEN: Soft, Nontender, Nondistended; Bowel sounds present  EXTREMITIES: no cyanosis; no edema; no calf tenderness  SKIN: warm and dry; no rash  NERVOUS SYSTEM:  Awake and alert; Oriented  to place, person and time ; no new deficits    LABS:                        14.0   6.30  )-----------( 170      ( 2020 07:08 )             41.7         138  |  104  |  15  ----------------------------<  89  4.1   |  28  |  1.07    Ca    8.8      2020 07:08  Phos  1.7       Mg     1.9             Urinalysis Basic - ( 2020 16:52 )    Color: Yellow / Appearance: Clear / S.010 / pH: x  Gluc: x / Ketone: Negative  / Bili: Negative / Urobili: Negative   Blood: x / Protein: 30 mg/dL / Nitrite: Negative   Leuk Esterase: Negative / RBC: x / WBC 3-5 /HPF   Sq Epi: x / Non Sq Epi: Few /HPF / Bacteria: Trace /HPF        CARDIAC MARKERS ( 2020 14:27 )  0.152 ng/mL / x     / x     / x     / x      CARDIAC MARKERS ( 2020 07:16 )  0.149 ng/mL / x     / x     / x     / x      CARDIAC MARKERS ( 2020 22:47 )  0.112 ng/mL / x     / x     / x     / x            Serum Pro-Brain Natriuretic Peptide: 2974 pg/mL (20 @ 14:58)              _________________________________________________  LABS:                        14.1   7.29  )-----------( 165      ( 2020 07:16 )             41.9     01-25    140  |  109<H>  |  16  ----------------------------<  85  3.9   |  24  |  1.00    Ca    8.2<L>      2020 07:16  Phos  1.7     01-25  Mg     1.9         TPro  6.4  /  Alb  3.3<L>  /  TBili  1.2  /  DBili  x   /  AST  22  /  ALT  23  /  AlkPhos  79  -24    PT/INR - ( 2020 14:58 )   PT: 13.8 sec;   INR: 1.24 ratio         PTT - ( 2020 14:58 )  PTT:35.4 sec  Urinalysis Basic - ( 2020 16:52 )    Color: Yellow / Appearance: Clear / S.010 / pH: x  Gluc: x / Ketone: Negative  / Bili: Negative / Urobili: Negative   Blood: x / Protein: 30 mg/dL / Nitrite: Negative   Leuk Esterase: Negative / RBC: x / WBC 3-5 /HPF   Sq Epi: x / Non Sq Epi: Few /HPF / Bacteria: Trace /HPF      CAPILLARY BLOOD GLUCOSE            RADIOLOGY & ADDITIONAL TESTS:    Imaging Personally Reviewed:  YES/NO    Consultant(s) Notes Reviewed:   YES/ No    Care Discussed with Consultants :     Plan of care was discussed with patient and /or primary care giver; all questions and concerns were addressed and care was aligned with patient's wishes.

## 2020-01-27 ENCOUNTER — TRANSCRIPTION ENCOUNTER (OUTPATIENT)
Age: 85
End: 2020-01-27

## 2020-01-27 VITALS
RESPIRATION RATE: 19 BRPM | HEART RATE: 90 BPM | DIASTOLIC BLOOD PRESSURE: 64 MMHG | OXYGEN SATURATION: 98 % | TEMPERATURE: 98 F | SYSTOLIC BLOOD PRESSURE: 106 MMHG

## 2020-01-27 LAB
ANION GAP SERPL CALC-SCNC: 6 MMOL/L — SIGNIFICANT CHANGE UP (ref 5–17)
BASOPHILS # BLD AUTO: 0.02 K/UL — SIGNIFICANT CHANGE UP (ref 0–0.2)
BASOPHILS NFR BLD AUTO: 0.3 % — SIGNIFICANT CHANGE UP (ref 0–2)
BUN SERPL-MCNC: 17 MG/DL — SIGNIFICANT CHANGE UP (ref 7–18)
CALCIUM SERPL-MCNC: 8.8 MG/DL — SIGNIFICANT CHANGE UP (ref 8.4–10.5)
CHLORIDE SERPL-SCNC: 102 MMOL/L — SIGNIFICANT CHANGE UP (ref 96–108)
CO2 SERPL-SCNC: 31 MMOL/L — SIGNIFICANT CHANGE UP (ref 22–31)
CREAT SERPL-MCNC: 1.18 MG/DL — SIGNIFICANT CHANGE UP (ref 0.5–1.3)
EOSINOPHIL # BLD AUTO: 0.28 K/UL — SIGNIFICANT CHANGE UP (ref 0–0.5)
EOSINOPHIL NFR BLD AUTO: 4.6 % — SIGNIFICANT CHANGE UP (ref 0–6)
GLUCOSE BLDC GLUCOMTR-MCNC: 103 MG/DL — HIGH (ref 70–99)
GLUCOSE SERPL-MCNC: 91 MG/DL — SIGNIFICANT CHANGE UP (ref 70–99)
HCT VFR BLD CALC: 42.3 % — SIGNIFICANT CHANGE UP (ref 39–50)
HGB BLD-MCNC: 14.3 G/DL — SIGNIFICANT CHANGE UP (ref 13–17)
IMM GRANULOCYTES NFR BLD AUTO: 0.3 % — SIGNIFICANT CHANGE UP (ref 0–1.5)
LYMPHOCYTES # BLD AUTO: 1.45 K/UL — SIGNIFICANT CHANGE UP (ref 1–3.3)
LYMPHOCYTES # BLD AUTO: 24 % — SIGNIFICANT CHANGE UP (ref 13–44)
MCHC RBC-ENTMCNC: 32.5 PG — SIGNIFICANT CHANGE UP (ref 27–34)
MCHC RBC-ENTMCNC: 33.8 GM/DL — SIGNIFICANT CHANGE UP (ref 32–36)
MCV RBC AUTO: 96.1 FL — SIGNIFICANT CHANGE UP (ref 80–100)
MONOCYTES # BLD AUTO: 0.75 K/UL — SIGNIFICANT CHANGE UP (ref 0–0.9)
MONOCYTES NFR BLD AUTO: 12.4 % — SIGNIFICANT CHANGE UP (ref 2–14)
NEUTROPHILS # BLD AUTO: 3.51 K/UL — SIGNIFICANT CHANGE UP (ref 1.8–7.4)
NEUTROPHILS NFR BLD AUTO: 58.4 % — SIGNIFICANT CHANGE UP (ref 43–77)
NRBC # BLD: 0 /100 WBCS — SIGNIFICANT CHANGE UP (ref 0–0)
PHOSPHATE SERPL-MCNC: 2.5 MG/DL — SIGNIFICANT CHANGE UP (ref 2.5–4.5)
PLATELET # BLD AUTO: 186 K/UL — SIGNIFICANT CHANGE UP (ref 150–400)
POTASSIUM SERPL-MCNC: 4 MMOL/L — SIGNIFICANT CHANGE UP (ref 3.5–5.3)
POTASSIUM SERPL-SCNC: 4 MMOL/L — SIGNIFICANT CHANGE UP (ref 3.5–5.3)
RBC # BLD: 4.4 M/UL — SIGNIFICANT CHANGE UP (ref 4.2–5.8)
RBC # FLD: 14 % — SIGNIFICANT CHANGE UP (ref 10.3–14.5)
SODIUM SERPL-SCNC: 139 MMOL/L — SIGNIFICANT CHANGE UP (ref 135–145)
WBC # BLD: 6.03 K/UL — SIGNIFICANT CHANGE UP (ref 3.8–10.5)
WBC # FLD AUTO: 6.03 K/UL — SIGNIFICANT CHANGE UP (ref 3.8–10.5)

## 2020-01-27 PROCEDURE — 93306 TTE W/DOPPLER COMPLETE: CPT

## 2020-01-27 PROCEDURE — 82306 VITAMIN D 25 HYDROXY: CPT

## 2020-01-27 PROCEDURE — 84484 ASSAY OF TROPONIN QUANT: CPT

## 2020-01-27 PROCEDURE — 84100 ASSAY OF PHOSPHORUS: CPT

## 2020-01-27 PROCEDURE — 83605 ASSAY OF LACTIC ACID: CPT

## 2020-01-27 PROCEDURE — 80061 LIPID PANEL: CPT

## 2020-01-27 PROCEDURE — 87040 BLOOD CULTURE FOR BACTERIA: CPT

## 2020-01-27 PROCEDURE — 84443 ASSAY THYROID STIM HORMONE: CPT

## 2020-01-27 PROCEDURE — 82962 GLUCOSE BLOOD TEST: CPT

## 2020-01-27 PROCEDURE — 99285 EMERGENCY DEPT VISIT HI MDM: CPT | Mod: 25

## 2020-01-27 PROCEDURE — 85610 PROTHROMBIN TIME: CPT

## 2020-01-27 PROCEDURE — 80048 BASIC METABOLIC PNL TOTAL CA: CPT

## 2020-01-27 PROCEDURE — 81001 URINALYSIS AUTO W/SCOPE: CPT

## 2020-01-27 PROCEDURE — 83735 ASSAY OF MAGNESIUM: CPT

## 2020-01-27 PROCEDURE — 85730 THROMBOPLASTIN TIME PARTIAL: CPT

## 2020-01-27 PROCEDURE — 83036 HEMOGLOBIN GLYCOSYLATED A1C: CPT

## 2020-01-27 PROCEDURE — 36415 COLL VENOUS BLD VENIPUNCTURE: CPT

## 2020-01-27 PROCEDURE — 87631 RESP VIRUS 3-5 TARGETS: CPT

## 2020-01-27 PROCEDURE — 85027 COMPLETE CBC AUTOMATED: CPT

## 2020-01-27 PROCEDURE — 71250 CT THORAX DX C-: CPT

## 2020-01-27 PROCEDURE — 87086 URINE CULTURE/COLONY COUNT: CPT

## 2020-01-27 PROCEDURE — 82607 VITAMIN B-12: CPT

## 2020-01-27 PROCEDURE — 93005 ELECTROCARDIOGRAM TRACING: CPT

## 2020-01-27 PROCEDURE — 80053 COMPREHEN METABOLIC PANEL: CPT

## 2020-01-27 PROCEDURE — 71045 X-RAY EXAM CHEST 1 VIEW: CPT

## 2020-01-27 PROCEDURE — 83880 ASSAY OF NATRIURETIC PEPTIDE: CPT

## 2020-01-27 PROCEDURE — 97161 PT EVAL LOW COMPLEX 20 MIN: CPT

## 2020-01-27 RX ORDER — ASPIRIN/CALCIUM CARB/MAGNESIUM 324 MG
1 TABLET ORAL
Qty: 0 | Refills: 0 | DISCHARGE

## 2020-01-27 RX ORDER — ACETAMINOPHEN 500 MG
2 TABLET ORAL
Qty: 0 | Refills: 0 | DISCHARGE

## 2020-01-27 RX ORDER — CEFUROXIME AXETIL 250 MG
1 TABLET ORAL
Qty: 10 | Refills: 0
Start: 2020-01-27 | End: 2020-01-31

## 2020-01-27 RX ORDER — CALCIUM CARBONATE 500(1250)
1 TABLET ORAL
Qty: 0 | Refills: 0 | DISCHARGE

## 2020-01-27 RX ORDER — AZITHROMYCIN 500 MG/1
1 TABLET, FILM COATED ORAL
Qty: 1 | Refills: 0
Start: 2020-01-27 | End: 2020-01-27

## 2020-01-27 RX ORDER — ACETAMINOPHEN 500 MG
1 TABLET ORAL
Qty: 0 | Refills: 0 | DISCHARGE

## 2020-01-27 RX ORDER — ISOSORBIDE MONONITRATE 60 MG/1
1 TABLET, EXTENDED RELEASE ORAL
Qty: 0 | Refills: 0 | DISCHARGE

## 2020-01-27 RX ADMIN — Medication 50 MILLIGRAM(S): at 06:00

## 2020-01-27 RX ADMIN — CEFTRIAXONE 100 MILLIGRAM(S): 500 INJECTION, POWDER, FOR SOLUTION INTRAMUSCULAR; INTRAVENOUS at 10:06

## 2020-01-27 RX ADMIN — PANTOPRAZOLE SODIUM 40 MILLIGRAM(S): 20 TABLET, DELAYED RELEASE ORAL at 06:01

## 2020-01-27 RX ADMIN — ENOXAPARIN SODIUM 40 MILLIGRAM(S): 100 INJECTION SUBCUTANEOUS at 12:20

## 2020-01-27 RX ADMIN — Medication 40 MILLIGRAM(S): at 12:20

## 2020-01-27 RX ADMIN — AZITHROMYCIN 255 MILLIGRAM(S): 500 TABLET, FILM COATED ORAL at 10:07

## 2020-01-27 NOTE — DISCHARGE NOTE PROVIDER - NSDCMRMEDTOKEN_GEN_ALL_CORE_FT
isosorbide mononitrate 30 mg oral tablet, extended release: 1 tab(s) orally once a day (in the morning)  Lasix 40 mg oral tablet: 1 tab(s) orally once a day  Metoprolol Tartrate 25 mg oral tablet: 1 tab(s) orally once a day  Pepcid 20 mg oral tablet: 1 tab(s) orally once a day  simvastatin 10 mg oral tablet: 1 tab(s) orally once a day (at bedtime)  terazosin 5 mg oral capsule: 1 cap(s) orally once a day (at bedtime)  Vitamin D3 1000 intl units (25 mcg) oral tablet: 1 tab(s) orally once a day aspirin 81 mg oral tablet: 1 tab(s) orally once a day  Caltrate 600 mg oral tablet: 1 tab(s) orally once a day  isosorbide mononitrate 30 mg oral tablet, extended release: 1 tab(s) orally once a day (in the morning)  Lasix 40 mg oral tablet: 1 tab(s) orally once a day  Metoprolol Tartrate 25 mg oral tablet: 1 tab(s) orally once a day  Omega-3 1000 mg oral capsule: 1 cap(s) orally every other day  Pepcid 20 mg oral tablet: 1 tab(s) orally once a day  simvastatin 10 mg oral tablet: 1 tab(s) orally once a day (at bedtime)  terazosin 5 mg oral capsule: 1 cap(s) orally once a day (at bedtime)  Tylenol 500 mg oral tablet: 2 tab(s) orally 2 times a day, As Needed  Vitamin D3 1000 intl units (25 mcg) oral tablet: 1 tab(s) orally once a day aspirin 81 mg oral tablet: 1 tab(s) orally once a day  azithromycin 500 mg oral tablet: 1 tab(s) orally once a day   Caltrate 600 mg oral tablet: 1 tab(s) orally once a day  cefuroxime 500 mg oral tablet: 1 tab(s) orally 2 times a day   home physical therapy : home physical therapy for strength   isosorbide mononitrate 30 mg oral tablet, extended release: 1 tab(s) orally once a day (in the morning)  Lasix 40 mg oral tablet: 1 tab(s) orally once a day  Metoprolol Tartrate 25 mg oral tablet: 1 tab(s) orally once a day  Omega-3 1000 mg oral capsule: 1 cap(s) orally every other day  Pepcid 20 mg oral tablet: 1 tab(s) orally once a day  simvastatin 10 mg oral tablet: 1 tab(s) orally once a day (at bedtime)  terazosin 5 mg oral capsule: 1 cap(s) orally once a day (at bedtime)  Tylenol 500 mg oral tablet: 2 tab(s) orally 2 times a day, As Needed  Vitamin D3 1000 intl units (25 mcg) oral tablet: 1 tab(s) orally once a day

## 2020-01-27 NOTE — DISCHARGE NOTE PROVIDER - NSDCCPCAREPLAN_GEN_ALL_CORE_FT
PRINCIPAL DISCHARGE DIAGNOSIS  Diagnosis: Pneumonia of right middle lobe due to infectious organism  Assessment and Plan of Treatment: Treated in the hospital with respiratory therapy and IV antibiotics:  With significant imporvemnet to respiratory status. Will continue a course of oral antibiotics for 1 days.  Make sure you follow up with Primary Care physician within 1-2 weeks of discharge to inform of your recent admission.        SECONDARY DISCHARGE DIAGNOSES  Diagnosis: Elevated troponin  Assessment and Plan of Treatment: Yor cardiac enzymes were noted to be elevated and you were seen by cardiologist. This was likely in the setting of demand ischemia from the infection.    Diagnosis: History of hypertension  Assessment and Plan of Treatment: Continue with blood pressure medication. Maintain a healthy diet that consist of low sugar, low fat, low sodium diet. Exercise frequently if possible.  Follow up with primary care physician in one week after discharge.      Diagnosis: Hypercholesterolemia  Assessment and Plan of Treatment: Continue with cholesterol medications. Maintain a healthy diet that consist of low sugar, low fat, low sodium diet. Exercise frequently if possible.  Follow up with primary care physician in one week after discharge.  Diet suggested: DASH Diet that Emphasizes vegetables, fruits, and fat-free or low-fat dairy products. Includes whole grains, fish, poultry, beans, seeds, nuts, and vegetable oils. Limits sodium, sweets, sugary beverages, and red meats.      Diagnosis: Generalized weakness  Assessment and Plan of Treatment: PRINCIPAL DISCHARGE DIAGNOSIS  Diagnosis: Pneumonia of right middle lobe due to infectious organism  Assessment and Plan of Treatment: Treated in the hospital with respiratory therapy and IV antibiotics:  With significant imporvemnet to respiratory status. Will continue a course of oral antibiotics azithromycin for 1 day and cefuroximine for 5 more days  Make sure you follow up with Primary Care physician within 1-2 weeks of discharge to inform of your recent admission.        SECONDARY DISCHARGE DIAGNOSES  Diagnosis: Elevated troponin  Assessment and Plan of Treatment: Yor cardiac enzymes were noted to be elevated and you were seen by cardiologist. This was likely in the setting of demand ischemia from the infection. Your echocardiogram showed Grade 2 diastolic dysfunction and normal ejection fraction.    Diagnosis: History of hypertension  Assessment and Plan of Treatment: Continue with blood pressure medication. Maintain a healthy diet that consist of low sugar, low fat, low sodium diet. Exercise frequently if possible.  Follow up with primary care physician in one week after discharge.      Diagnosis: Hypercholesterolemia  Assessment and Plan of Treatment: Continue with cholesterol medications. Maintain a healthy diet that consist of low sugar, low fat, low sodium diet. Exercise frequently if possible.  Follow up with primary care physician in one week after discharge.  Diet suggested: DASH Diet that Emphasizes vegetables, fruits, and fat-free or low-fat dairy products. Includes whole grains, fish, poultry, beans, seeds, nuts, and vegetable oils. Limits sodium, sweets, sugary beverages, and red meats.

## 2020-01-27 NOTE — PROGRESS NOTE ADULT - NSHPATTENDINGPLANDISCUSS_GEN_ALL_CORE
resident , patient and his family.

## 2020-01-27 NOTE — DISCHARGE NOTE NURSING/CASE MANAGEMENT/SOCIAL WORK - PATIENT PORTAL LINK FT
You can access the FollowMyHealth Patient Portal offered by [facility] by registering at the following website: [email or web address]. By joining [facility]’s FollowMyHealth portal, you will also be able to view your health information using other applications (apps) compatible with our system.

## 2020-01-27 NOTE — DISCHARGE NOTE PROVIDER - CARE PROVIDER_API CALL
Willian Richardson)  Cardiology  [address] [id number]  Phone: [phone]  Fax: [phone]  Follow Up Time:

## 2020-01-27 NOTE — PROGRESS NOTE ADULT - SUBJECTIVE AND OBJECTIVE BOX
Patient is a 89y old  Male who presents with a chief complaint of weakness (2020 19:57)      INTERVAL HPI/OVERNIGHT EVENTS: offers no new complaints; current symptoms resolving  MEDICATIONS  (STANDING):  azithromycin  IVPB 500 milliGRAM(s) IV Intermittent every 24 hours  azithromycin  IVPB      cefTRIAXone   IVPB 1000 milliGRAM(s) IV Intermittent every 24 hours  doxazosin 4 milliGRAM(s) Oral at bedtime  enoxaparin Injectable 40 milliGRAM(s) SubCutaneous daily  furosemide   Injectable 40 milliGRAM(s) IV Push every 24 hours  metoprolol succinate ER 50 milliGRAM(s) Oral daily  pantoprazole    Tablet 40 milliGRAM(s) Oral before breakfast  simvastatin 10 milliGRAM(s) Oral at bedtime    MEDICATIONS  (PRN):  albuterol/ipratropium for Nebulization. 3 milliLiter(s) Nebulizer every 6 hours PRN Shortness of Breath and/or Wheezing    PAST MEDICAL & SURGICAL HISTORY:  History of COPD  BPH (benign prostatic hyperplasia)  Hypercholesterolemia  History of hypertension  No significant past surgical history  __________________________________________________  REVIEW OF SYSTEMS:    CONSTITUTIONAL: No fever,   EYES: no acute visual disturbances  NECK: No pain or stiffness  RESPIRATORY: No cough; No shortness of breath  CARDIOVASCULAR: No chest pain, no palpitations  GASTROINTESTINAL: No pain. No nausea or vomiting; No diarrhea   NEUROLOGICAL: No headache or numbness, no tremors  MUSCULOSKELETAL: No joint pain, no muscle pain  GENITOURINARY: no dysuria, no frequency, no hesitancy  PSYCHIATRY: no depression , no anxiety  ALL OTHER  ROS negative      Vital Signs Last 24 Hrs  T(C): 36.4 (2020 11:18), Max: 36.5 (2020 20:25)  T(F): 97.5 (2020 11:18), Max: 97.7 (2020 20:25)  HR: 65 (2020 11:18) (63 - 78)  BP: 111/59 (2020 11:18) (111/59 - 137/74)  BP(mean): --  RR: 18 (2020 11:18) (18 - 20)  SpO2: 97% (2020 11:18) (95% - 98%)      ________________________________________________  PHYSICAL EXAM:  GENERAL: NAD  HEENT: Normocephalic;  conjunctivae and sclerae clear; moist mucous membranes;   NECK : supple  CHEST/LUNG: Clear to auscultation bilaterally with good air entry   HEART: S1 S2  regular; no murmurs, gallops or rubs  ABDOMEN: Soft, Nontender, Nondistended; Bowel sounds present  EXTREMITIES: no cyanosis; no edema; no calf tenderness  SKIN: warm and dry; no rash  NERVOUS SYSTEM:  Awake and alert; Oriented  to place, person and time ; no new deficits    _________________________________________________    < from: Transthoracic Echocardiogram (20 @ 06:55) >  CONCLUSIONS:  1. Normal mitral valve. Trace mitral regurgitation.  2. Probably trileaflet aortic valve is not well seen. No  aortic stenosis. Moderate to severe aortic regurgitation  (PHT 490msec, vena contracta 0.6 cm).  3. Normal aortic root.  4. Mildly dilated left atrium.  5. Mild left ventricular enlargement.  6. Endocardium not well visualized; grossly normal left  ventricular systolic function based on limited views.  7. Grade II diastolic dysfunction.  8. Right ventricle not well visualized. Normal RV systolic  function (RV tissue Doppler  11cm/s).  9. RV systolic pressure is borderline normal at  33 mm Hg.  10. Tricuspid valve not well seen. Trace tricuspid  regurgitation.  11. No pericardial effusion.    *** Compared with echocardiogram report of 2017, there  appears to be increased severity of aortic regurgitation  based on technically dfficult study.  ------------------------------------------------------------------------    < end of copied text >    LABS:                        14.3   6.03  )-----------( 186      ( 2020 07:16 )             42.3         139  |  102  |  17  ----------------------------<  91  4.0   |  31  |  1.18    Ca    8.8      2020 07:16  Phos  2.5           Culture - Blood (20 @ 01:20)    Specimen Source: .Blood    Culture Results:   No growth to date.    Culture - Urine (20 @ 01:01)    Specimen Source: .Urine    Culture Results:   <10,000 CFU/mL Normal Urogenital Bhumi          CARDIAC MARKERS ( 2020 14:27 )  0.152 ng/mL / x     / x     / x     / x            Serum Pro-Brain Natriuretic Peptide: 2974 pg/mL (20 @ 14:58)            LABS:                        14.1   7.29  )-----------( 165      ( 2020 07:16 )             41.9         140  |  109<H>  |  16  ----------------------------<  85  3.9   |  24  |  1.00    Ca    8.2<L>      2020 07:16  Phos  1.7       Mg     1.9         TPro  6.4  /  Alb  3.3<L>  /  TBili  1.2  /  DBili  x   /  AST  22  /  ALT  23  /  AlkPhos  79  24    PT/INR - ( 2020 14:58 )   PT: 13.8 sec;   INR: 1.24 ratio         PTT - ( 2020 14:58 )  PTT:35.4 sec  Urinalysis Basic - ( 2020 16:52 )    Color: Yellow / Appearance: Clear / S.010 / pH: x  Gluc: x / Ketone: Negative  / Bili: Negative / Urobili: Negative   Blood: x / Protein: 30 mg/dL / Nitrite: Negative   Leuk Esterase: Negative / RBC: x / WBC 3-5 /HPF   Sq Epi: x / Non Sq Epi: Few /HPF / Bacteria: Trace /HPF      CAPILLARY BLOOD GLUCOSE            RADIOLOGY & ADDITIONAL TESTS:    Imaging Personally Reviewed:  YES/NO    Consultant(s) Notes Reviewed:   YES/ No    Care Discussed with Consultants :     Plan of care was discussed with patient and /or primary care giver; all questions and concerns were addressed and care was aligned with patient's wishes.

## 2020-01-27 NOTE — DISCHARGE NOTE PROVIDER - HOSPITAL COURSE
89 y M from assisted living , walks with walker with MHx of HTN, BPH,latent TB and COPD and no significant PSHx presented to ED with Generalised weakness since yesterday. Pt states he is feeling weak overall with dec appetite since yesterday but denies any fever,chills, SOB,CP , N/V/D/C ,abd pain , any dysuria ,leg swelling or any other complaints. Pt says he has chronic cough after the Pneumonia last yr. SH : Former smoker ,smoked 1 PPD x 40 yrs . Drinks ocassionally . Denies any drug use. GOC : Pt doesnt want CPR/intubation . Says he filled the form long time back (24 Jan 2020 19:57)        ED course :     Vitals : stable     Labs : unremarkable except for Cr 1.34 , T1 0.096-> .14, Flu -ve . UA -ve     Pt was admitted fot Weakness and elevated troponins.         Generalized weakness:     CXR : Mild pul vascular congestion and interstitial infiltrates . CXR was done after pt got 2L NS and 1 dose of rocephin and zithromax-> CT was performed to ddx btw fluid over load and concerns for pneumonia. CT showed LLL bronchopneumonia and pt was started on zithromax and rocephin. Pt's symptoms improved and was planned to discharge him on 1 more day of oral antibiotics.         elevated troponins: .09 89 y M from assisted living , walks with walker with MHx of HTN, BPH,latent TB and COPD and no significant PSHx presented to ED with Generalised weakness since yesterday. Pt states he is feeling weak overall with dec appetite since yesterday but denies any fever,chills, SOB,CP , N/V/D/C ,abd pain , any dysuria ,leg swelling or any other complaints. Pt says he has chronic cough after the Pneumonia last yr. SH : Former smoker ,smoked 1 PPD x 40 yrs . Drinks ocassionally . Denies any drug use. GOC : Pt doesnt want CPR/intubation . Says he filled the form long time back (24 Jan 2020 19:57)        ED course :     Vitals : stable     Labs : unremarkable except for Cr 1.34 , T1 0.096-> .14, Flu -ve . UA -ve     Pt was admitted fot Weakness and elevated troponins.         Generalized weakness:     CXR : Mild pul vascular congestion and interstitial infiltrates . CXR was done after pt got 2L NS and 1 dose of rocephin and zithromax-> CT was performed to ddx btw fluid over load and concerns for pneumonia. CT showed LLL bronchopneumonia and pt was started on zithromax and rocephin. Pt's symptoms improved and was planned to discharge him on 1 more day of oral antibiotics.         elevated troponins 1->.09 t2 .112 ->.149 cardiology was consulted and evaluated to be likely from demand ischemia. Continued on his home medications             Given patient's improved clinical status and current hemodynamic stability, decision was made to discharge.    Please refer to patient's complete medical chart with documents for a full hospital course, for this is only a brief summary. 89 y M from assisted living , walks with walker with MHx of HTN, BPH,latent TB and COPD and no significant PSHx presented to ED with Generalised weakness since yesterday. Pt states he is feeling weak overall with dec appetite since yesterday but denies any fever,chills, SOB,CP , N/V/D/C ,abd pain , any dysuria ,leg swelling or any other complaints. Pt says he has chronic cough after the Pneumonia last yr. SH : Former smoker ,smoked 1 PPD x 40 yrs . Drinks ocassionally . Denies any drug use. GOC : Pt doesnt want CPR/intubation . Says he filled the form long time back         ED course :     Vitals : stable     Labs : unremarkable except for Cr 1.34 , T1 0.096-> .14, Flu -ve . UA -ve     Pt was admitted fot Weakness and elevated troponins.         Generalized weakness:     CXR : Mild pul vascular congestion and interstitial infiltrates . CXR was done after pt got 2L NS and 1 dose of rocephin and zithromax-> CT was performed to ddx btw fluid over load and concerns for pneumonia. CT showed LLL bronchopneumonia and pt was started on zithromax and rocephin. Pt's symptoms improved and was planned to discharge him on few days of oral antibiotics.         elevated troponins 1->.09 t2 .112 ->.149 cardiology was consulted and evaluated to be likely from demand ischemia, Echo cardio gram showed normal EF and G2dd. Continued on his home medications             Given patient's improved clinical status and current hemodynamic stability, decision was made to discharge.    Please refer to patient's complete medical chart with documents for a full hospital course, for this is only a brief summary.

## 2020-05-18 NOTE — PHYSICAL THERAPY INITIAL EVALUATION ADULT - PHYSICAL ASSIST/NONPHYSICAL ASSIST: SIT/STAND, REHAB EVAL
I supervised care provided by the midlevel provider.   We have discussed this patient's history, physical exam, and treatment plan.  I have reviewed the note and personally saw and examined the patient and agree with the plan of care.   I have seen and examined this patient.  He has been dealing with this back pain episodically since the beginning of the year January or February 2020.  The pain is mainly in his right buttocks radiates to his right greater troches.  Pain is worse with movement rolling over trying to sit up.  There is periods of times where the pain is worse and then periods of time for it eases off.  Patient denies any direct trauma to back. No fevers. No h/o IVDU. No chronic steroid use. No anticoagulants. No h/o malignancy.  No weakness to lower extremities. No saddle anesthesia or any new urinary/fecal incontinence or retention.  As mentioned above no motor or sensory changes to lower extremities bilaterally        GENERAL: not distressed  HENT: nares patent  Head/neck/ face are symmetric without gross deformity or swelling  EYES: no scleral icterus  CV: regular rhythm, regular rate with intact distal pulses  RESPIRATORY: normal effort and no respiratory distress  ABDOMEN: Obese soft and non-tender  MUSCULOSKELETAL: no deformity.  Location of pain is at his right buttocks to his right greater troches.  It is reproducible with rolling over and trying to sit up.  Normal saddle sensation and normal rectal tone  5/5 strength to hip flexion, knee extension/flexion, dorsiflexion, plantarflexion, and EHL.  No pain with bilateral external and internal rotation of hips.  Intact distal pulses with no cyanosis, pallor, or temperature change on palpation. Normal inspection and palpation with soft compartments.  SILT at bilateral superficial peroneal, deep peroneal, sural, and saphenous nerves  NEURO: alert and appropriate, moves all extremities, follows commands  SKIN: warm, dry    Vital signs and nursing  notes reviewed.    Plan patient had recent x-rays for the same pain on March 9, 2019.  Explained to him he does not need an emergent MRI as he has no motor or sensory changes, no bowel or bladder changes, no history of malignancy, not on blood thinning medicines, and neurologically intact..  Patient very likely has sciatica but it could be a potential L5, L4, or S1 radiculopathy.  We will have him follow-up with his primary care doctor, Dr. Clements, and we will also give him a referral to the neurosurgeon on-call.  He is out of his hydrocodone instruction to take Tylenol Motrin for pain and to use the hydrocodone for breakthrough pain such as when he tries to sleep.  We will only give him prescription enough for a couple days.  We are currently under a pandemic from the COVID19 infection.  The patient presented to the emergency department by ambulance or personal vehicle.  During current hospital restrictions no other visitors were present in the emergency department during my evaluation and treatment. I followed the current protocols required by Infection Control at [facility] in my evaluation and treatment of the patient. The patient was wearing a face mask during my evaluation and throughout my encounter. During my whole encounter with this patient I used appropriate personal protective equipment.  This equipment consisted of eye protection, facemask, gown, and gloves.  I applied this equipment before entering the room.      Patient was seen in the emergency department on March 9, 2020 for low back pain patient had x-rays of the lumbar spine series and of the right hip.  Report was reviewed.  There is some arthritic changes with some spurring along the endplate of L5 of the vertebral body heights are remained and unremarkable.  No fractures.     Rahul Jesus MD  05/18/20 [id number]     1 person assist

## 2020-09-29 NOTE — ED ADULT TRIAGE NOTE - MEANS OF ARRIVAL
Rufino Davis M.D.     =  1. Based on the history you given review of emergency room records from July 2020, it appears that you are having recurrent vaginal yeast infections and not recurrent bladder infections.   Fo before bedtime, drink it out of a ice cold \"shot glass\" and only have 1 \"shot\" of water or juice.   If you wake up at night to urinate, do not drink any water, will cause your body to manufacture more urine and urinate more often at night; if your mouth stretcher

## 2021-09-18 NOTE — PROGRESS NOTE ADULT - ASSESSMENT
End of Shift Note     Bedside shift change report given to [id number] [facility]  (oncoming nurse) by Rubina Villanueva RN (offgoing nurse).   Report included the following information SBAR, Kardex, Intake/Output and MAR     Shift worked:  7p - 7a   Shift summary and any significant changes:     none         Concerns for physician to address:  none   Zone phone for oncoming shift:   [phone]      Patient Information  Solo Palacios  [de-identified] y.o.  9/15/2021  2:17 PM by Di Perez MD. 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 ([address] 05/25/2021    Diarrhea 06/03/2020    Severe protein-calorie malnutrition ([address] 06/02/2020    Acute on chronic renal failure ([address] 06/01/2020    Septic shock ([address] 03/12/2020    E coli bacteremia 03/12/2020    SONIDO (acute kidney injury) ([address] 03/12/2020    Acute respiratory failure ([address] 03/05/2020    Adenocarcinoma of esophagus ([address] 05/30/2019    Esophageal dysphagia 05/22/2019    Abnormal x-ray of abdomen 05/22/2019    History of colon polyps 06/01/2018           Past Medical History:   Diagnosis Date    Abnormal x-ray of abdomen 05/22/2019     Bas showed food and irregular stricture above ge junction in addition to large hiatal hernia  5.20.2019    Adenocarcinoma of esophagus ([address] 5/30/2019     chemotherapy    Anemia      Arrhythmia      Arthritis      Chronic kidney disease       Stage II followed by Dr Yeimy Chan Nephrology     Diverticulosis      Esophageal dysphagia 5/22/2019    Glaucoma       bilateral    Hiatal hernia      History of Clostridioides difficile infection 05/2019    History of colon polyps 6/1/2018     2013 tubular adenoma     Hypertension      Hypothyroid      Pulmonary HTN ([facility]      Sleep apnea       CPAP compliant, as stated 5/25/2021    Thromboembolus ([facility] 2015     Right  leg , spontaneous            Activity:  Activity Level: Bed Rest  Number times ambulated in hallways past shift: 0  Number of times OOB to chair past shift: 0     Cardiac:   Cardiac Monitoring: Yes      Cardiac Rhythm: Sinus Rhythm     Access:      Central Line? Yes Placement date  Reason Medically Necessary      Genitourinary:   Urinary status: external catheter     Respiratory:   O2 Device: Nasal cannula  Chronic home O2 use?: YES  Incentive spirometer at bedside: NO     GI:  Last Bowel Movement Date:  (PTA)  Current diet:  DIET NPO Ice Chips  ADULT TUBE FEEDING Jejunostomy; Standard without Fiber; Delivery Method: Cyclic; Cyclic Rate (mL/hr): 760; Cyclic Start Time: 7:53 PM; Cyclic Stop Time: 8:59 AM; Water Flush Volume (mL): 100; Water Flush Frequency: Q 4 hours  Passing flatus: YES  Tolerating current diet: YES     Pain Management:   Patient states pain is manageable on current regimen: YES     Skin:  Tomy Score: 12  Interventions: turn team, speciality bed, float heels, increase time out of bed, foam dressing, limit briefs and internal/external urinary devices    Patient Safety:  Fall Score:  Total Score: 3  Interventions: bed/chair alarm, assistive device (walker, cane, etc), gripper socks and pt to call before getting OOB  High Fall Risk: Yes  @Rollbelt  @dexterity to release roll belt  Yes/No ( must document dexterity  here by stating Yes or No here, otherwise this is a restraint and must follow restraint documentation policy.)     DVT prophylaxis:  DVT prophylaxis Med- Yes  DVT prophylaxis SCD or CECELIA- Yes      Wounds: (If Applicable)  Wounds- Yes  Location sacrum     Active Consults:  IP CONSULT TO ONCOLOGY     Length of Stay:  Expected LOS: 2d 7h  Actual LOS: 2  Discharge Plan: No         Ava Enamorado RN                                              89 Male with PMH of HTN, COPD, BPH, Macular degeneration, Latent TB came to hospital for generalized weakness and unable to walk properly for 2 days. Admitted for ambulatory dysfunction

## 2022-01-01 ENCOUNTER — INPATIENT (INPATIENT)
Facility: HOSPITAL | Age: 87
LOS: 0 days | DRG: 951 | End: 2022-03-16
Attending: FAMILY MEDICINE | Admitting: FAMILY MEDICINE
Payer: OTHER MISCELLANEOUS

## 2022-01-01 ENCOUNTER — TRANSCRIPTION ENCOUNTER (OUTPATIENT)
Age: 87
End: 2022-01-01

## 2022-01-01 ENCOUNTER — INPATIENT (INPATIENT)
Facility: HOSPITAL | Age: 87
LOS: 7 days | Discharge: HOSPICE | DRG: 291 | End: 2022-03-15
Attending: INTERNAL MEDICINE | Admitting: INTERNAL MEDICINE
Payer: MEDICARE

## 2022-01-01 VITALS
RESPIRATION RATE: 24 BRPM | HEIGHT: 67 IN | OXYGEN SATURATION: 100 % | TEMPERATURE: 97 F | SYSTOLIC BLOOD PRESSURE: 109 MMHG | HEART RATE: 97 BPM | DIASTOLIC BLOOD PRESSURE: 66 MMHG

## 2022-01-01 VITALS
TEMPERATURE: 97 F | DIASTOLIC BLOOD PRESSURE: 60 MMHG | RESPIRATION RATE: 12 BRPM | SYSTOLIC BLOOD PRESSURE: 92 MMHG | HEART RATE: 115 BPM | OXYGEN SATURATION: 93 %

## 2022-01-01 VITALS — HEART RATE: 137 BPM | OXYGEN SATURATION: 91 % | RESPIRATION RATE: 33 BRPM

## 2022-01-01 DIAGNOSIS — F01.50 VASCULAR DEMENTIA WITHOUT BEHAVIORAL DISTURBANCE: ICD-10-CM

## 2022-01-01 DIAGNOSIS — Z51.5 ENCOUNTER FOR PALLIATIVE CARE: ICD-10-CM

## 2022-01-01 DIAGNOSIS — J44.9 CHRONIC OBSTRUCTIVE PULMONARY DISEASE, UNSPECIFIED: ICD-10-CM

## 2022-01-01 DIAGNOSIS — I50.9 HEART FAILURE, UNSPECIFIED: ICD-10-CM

## 2022-01-01 DIAGNOSIS — E11.9 TYPE 2 DIABETES MELLITUS WITHOUT COMPLICATIONS: ICD-10-CM

## 2022-01-01 DIAGNOSIS — N17.9 ACUTE KIDNEY FAILURE, UNSPECIFIED: ICD-10-CM

## 2022-01-01 DIAGNOSIS — J96.00 ACUTE RESPIRATORY FAILURE, UNSPECIFIED WHETHER WITH HYPOXIA OR HYPERCAPNIA: ICD-10-CM

## 2022-01-01 DIAGNOSIS — I48.91 UNSPECIFIED ATRIAL FIBRILLATION: ICD-10-CM

## 2022-01-01 DIAGNOSIS — I50.23 ACUTE ON CHRONIC SYSTOLIC (CONGESTIVE) HEART FAILURE: ICD-10-CM

## 2022-01-01 DIAGNOSIS — R77.8 OTHER SPECIFIED ABNORMALITIES OF PLASMA PROTEINS: ICD-10-CM

## 2022-01-01 DIAGNOSIS — I10 ESSENTIAL (PRIMARY) HYPERTENSION: ICD-10-CM

## 2022-01-01 DIAGNOSIS — J96.01 ACUTE RESPIRATORY FAILURE WITH HYPOXIA: ICD-10-CM

## 2022-01-01 DIAGNOSIS — Z29.9 ENCOUNTER FOR PROPHYLACTIC MEASURES, UNSPECIFIED: ICD-10-CM

## 2022-01-01 LAB
-  AMIKACIN: SIGNIFICANT CHANGE UP
-  AMOXICILLIN/CLAVULANIC ACID: SIGNIFICANT CHANGE UP
-  AMPICILLIN/SULBACTAM: SIGNIFICANT CHANGE UP
-  AMPICILLIN: SIGNIFICANT CHANGE UP
-  AZTREONAM: SIGNIFICANT CHANGE UP
-  CEFAZOLIN: SIGNIFICANT CHANGE UP
-  CEFEPIME: SIGNIFICANT CHANGE UP
-  CEFOXITIN: SIGNIFICANT CHANGE UP
-  CEFTRIAXONE: SIGNIFICANT CHANGE UP
-  CIPROFLOXACIN: SIGNIFICANT CHANGE UP
-  ERTAPENEM: SIGNIFICANT CHANGE UP
-  GENTAMICIN: SIGNIFICANT CHANGE UP
-  LEVOFLOXACIN: SIGNIFICANT CHANGE UP
-  MEROPENEM: SIGNIFICANT CHANGE UP
-  PIPERACILLIN/TAZOBACTAM: SIGNIFICANT CHANGE UP
-  TOBRAMYCIN: SIGNIFICANT CHANGE UP
-  TRIMETHOPRIM/SULFAMETHOXAZOLE: SIGNIFICANT CHANGE UP
A1C WITH ESTIMATED AVERAGE GLUCOSE RESULT: 6 % — HIGH (ref 4–5.6)
ACANTHOCYTES BLD QL SMEAR: SLIGHT — SIGNIFICANT CHANGE UP
ALBUMIN SERPL ELPH-MCNC: 2.2 G/DL — LOW (ref 3.5–5)
ALBUMIN SERPL ELPH-MCNC: 2.5 G/DL — LOW (ref 3.5–5)
ALBUMIN SERPL ELPH-MCNC: 2.9 G/DL — LOW (ref 3.5–5)
ALBUMIN SERPL ELPH-MCNC: 3.1 G/DL — LOW (ref 3.5–5)
ALBUMIN SERPL ELPH-MCNC: 3.1 G/DL — LOW (ref 3.5–5)
ALP SERPL-CCNC: 88 U/L — SIGNIFICANT CHANGE UP (ref 40–120)
ALP SERPL-CCNC: 91 U/L — SIGNIFICANT CHANGE UP (ref 40–120)
ALP SERPL-CCNC: 93 U/L — SIGNIFICANT CHANGE UP (ref 40–120)
ALP SERPL-CCNC: 94 U/L — SIGNIFICANT CHANGE UP (ref 40–120)
ALP SERPL-CCNC: 97 U/L — SIGNIFICANT CHANGE UP (ref 40–120)
ALT FLD-CCNC: 110 U/L DA — HIGH (ref 10–60)
ALT FLD-CCNC: 185 U/L DA — HIGH (ref 10–60)
ALT FLD-CCNC: 232 U/L DA — HIGH (ref 10–60)
ALT FLD-CCNC: 327 U/L DA — HIGH (ref 10–60)
ALT FLD-CCNC: 448 U/L DA — HIGH (ref 10–60)
ANION GAP SERPL CALC-SCNC: 11 MMOL/L — SIGNIFICANT CHANGE UP (ref 5–17)
ANION GAP SERPL CALC-SCNC: 11 MMOL/L — SIGNIFICANT CHANGE UP (ref 5–17)
ANION GAP SERPL CALC-SCNC: 6 MMOL/L — SIGNIFICANT CHANGE UP (ref 5–17)
ANION GAP SERPL CALC-SCNC: 7 MMOL/L — SIGNIFICANT CHANGE UP (ref 5–17)
ANION GAP SERPL CALC-SCNC: 7 MMOL/L — SIGNIFICANT CHANGE UP (ref 5–17)
ANION GAP SERPL CALC-SCNC: 8 MMOL/L — SIGNIFICANT CHANGE UP (ref 5–17)
ANION GAP SERPL CALC-SCNC: 8 MMOL/L — SIGNIFICANT CHANGE UP (ref 5–17)
ANION GAP SERPL CALC-SCNC: 9 MMOL/L — SIGNIFICANT CHANGE UP (ref 5–17)
ANION GAP SERPL CALC-SCNC: 9 MMOL/L — SIGNIFICANT CHANGE UP (ref 5–17)
ANISOCYTOSIS BLD QL: SLIGHT — SIGNIFICANT CHANGE UP
APPEARANCE UR: CLEAR — SIGNIFICANT CHANGE UP
APTT BLD: 45 SEC — HIGH (ref 27.5–35.5)
APTT BLD: >200 SEC — CRITICAL HIGH (ref 27.5–35.5)
AST SERPL-CCNC: 111 U/L — HIGH (ref 10–40)
AST SERPL-CCNC: 155 U/L — HIGH (ref 10–40)
AST SERPL-CCNC: 205 U/L — HIGH (ref 10–40)
AST SERPL-CCNC: 264 U/L — HIGH (ref 10–40)
AST SERPL-CCNC: 489 U/L — HIGH (ref 10–40)
BASE EXCESS BLDA CALC-SCNC: 1.2 MMOL/L — SIGNIFICANT CHANGE UP (ref -2–3)
BASE EXCESS BLDA CALC-SCNC: 3.6 MMOL/L — HIGH (ref -2–3)
BASE EXCESS BLDA CALC-SCNC: 7.7 MMOL/L — HIGH (ref -2–3)
BASE EXCESS BLDA CALC-SCNC: 8.6 MMOL/L — HIGH (ref -2–3)
BASE EXCESS BLDV CALC-SCNC: 7 MMOL/L — SIGNIFICANT CHANGE UP
BASOPHILS # BLD AUTO: 0 K/UL — SIGNIFICANT CHANGE UP (ref 0–0.2)
BASOPHILS # BLD AUTO: 0.01 K/UL — SIGNIFICANT CHANGE UP (ref 0–0.2)
BASOPHILS # BLD AUTO: 0.02 K/UL — SIGNIFICANT CHANGE UP (ref 0–0.2)
BASOPHILS # BLD AUTO: 0.03 K/UL — SIGNIFICANT CHANGE UP (ref 0–0.2)
BASOPHILS NFR BLD AUTO: 0 % — SIGNIFICANT CHANGE UP (ref 0–2)
BASOPHILS NFR BLD AUTO: 0.1 % — SIGNIFICANT CHANGE UP (ref 0–2)
BASOPHILS NFR BLD AUTO: 0.1 % — SIGNIFICANT CHANGE UP (ref 0–2)
BASOPHILS NFR BLD AUTO: 0.2 % — SIGNIFICANT CHANGE UP (ref 0–2)
BASOPHILS NFR BLD AUTO: 0.2 % — SIGNIFICANT CHANGE UP (ref 0–2)
BASOPHILS NFR BLD AUTO: 0.4 % — SIGNIFICANT CHANGE UP (ref 0–2)
BILIRUB SERPL-MCNC: 2.2 MG/DL — HIGH (ref 0.2–1.2)
BILIRUB SERPL-MCNC: 2.2 MG/DL — HIGH (ref 0.2–1.2)
BILIRUB SERPL-MCNC: 2.5 MG/DL — HIGH (ref 0.2–1.2)
BILIRUB SERPL-MCNC: 3 MG/DL — HIGH (ref 0.2–1.2)
BILIRUB SERPL-MCNC: 3.1 MG/DL — HIGH (ref 0.2–1.2)
BILIRUB UR-MCNC: NEGATIVE — SIGNIFICANT CHANGE UP
BLOOD GAS COMMENTS ARTERIAL: SIGNIFICANT CHANGE UP
BUN SERPL-MCNC: 34 MG/DL — HIGH (ref 7–18)
BUN SERPL-MCNC: 35 MG/DL — HIGH (ref 7–18)
BUN SERPL-MCNC: 41 MG/DL — HIGH (ref 7–18)
BUN SERPL-MCNC: 48 MG/DL — HIGH (ref 7–18)
BUN SERPL-MCNC: 50 MG/DL — HIGH (ref 7–18)
BUN SERPL-MCNC: 57 MG/DL — HIGH (ref 7–18)
BUN SERPL-MCNC: 69 MG/DL — HIGH (ref 7–18)
BUN SERPL-MCNC: 70 MG/DL — HIGH (ref 7–18)
BUN SERPL-MCNC: 70 MG/DL — HIGH (ref 7–18)
BURR CELLS BLD QL SMEAR: SLIGHT — SIGNIFICANT CHANGE UP
CALCIUM SERPL-MCNC: 10.1 MG/DL — SIGNIFICANT CHANGE UP (ref 8.4–10.5)
CALCIUM SERPL-MCNC: 10.1 MG/DL — SIGNIFICANT CHANGE UP (ref 8.4–10.5)
CALCIUM SERPL-MCNC: 8.7 MG/DL — SIGNIFICANT CHANGE UP (ref 8.4–10.5)
CALCIUM SERPL-MCNC: 9 MG/DL — SIGNIFICANT CHANGE UP (ref 8.4–10.5)
CALCIUM SERPL-MCNC: 9.5 MG/DL — SIGNIFICANT CHANGE UP (ref 8.4–10.5)
CHLORIDE SERPL-SCNC: 100 MMOL/L — SIGNIFICANT CHANGE UP (ref 96–108)
CHLORIDE SERPL-SCNC: 100 MMOL/L — SIGNIFICANT CHANGE UP (ref 96–108)
CHLORIDE SERPL-SCNC: 101 MMOL/L — SIGNIFICANT CHANGE UP (ref 96–108)
CHLORIDE SERPL-SCNC: 98 MMOL/L — SIGNIFICANT CHANGE UP (ref 96–108)
CHLORIDE SERPL-SCNC: 99 MMOL/L — SIGNIFICANT CHANGE UP (ref 96–108)
CHOLEST SERPL-MCNC: 103 MG/DL — SIGNIFICANT CHANGE UP
CO2 SERPL-SCNC: 26 MMOL/L — SIGNIFICANT CHANGE UP (ref 22–31)
CO2 SERPL-SCNC: 27 MMOL/L — SIGNIFICANT CHANGE UP (ref 22–31)
CO2 SERPL-SCNC: 28 MMOL/L — SIGNIFICANT CHANGE UP (ref 22–31)
CO2 SERPL-SCNC: 29 MMOL/L — SIGNIFICANT CHANGE UP (ref 22–31)
CO2 SERPL-SCNC: 31 MMOL/L — SIGNIFICANT CHANGE UP (ref 22–31)
CO2 SERPL-SCNC: 32 MMOL/L — HIGH (ref 22–31)
CO2 SERPL-SCNC: 33 MMOL/L — HIGH (ref 22–31)
COLOR SPEC: YELLOW — SIGNIFICANT CHANGE UP
CREAT SERPL-MCNC: 2.16 MG/DL — HIGH (ref 0.5–1.3)
CREAT SERPL-MCNC: 2.2 MG/DL — HIGH (ref 0.5–1.3)
CREAT SERPL-MCNC: 2.24 MG/DL — HIGH (ref 0.5–1.3)
CREAT SERPL-MCNC: 2.25 MG/DL — HIGH (ref 0.5–1.3)
CREAT SERPL-MCNC: 2.59 MG/DL — HIGH (ref 0.5–1.3)
CREAT SERPL-MCNC: 2.62 MG/DL — HIGH (ref 0.5–1.3)
CREAT SERPL-MCNC: 2.63 MG/DL — HIGH (ref 0.5–1.3)
CREAT SERPL-MCNC: 2.8 MG/DL — HIGH (ref 0.5–1.3)
CREAT SERPL-MCNC: 2.89 MG/DL — HIGH (ref 0.5–1.3)
CULTURE RESULTS: SIGNIFICANT CHANGE UP
D DIMER BLD IA.RAPID-MCNC: 268 NG/ML DDU — HIGH
DACRYOCYTES BLD QL SMEAR: SIGNIFICANT CHANGE UP
DIFF PNL FLD: NEGATIVE — SIGNIFICANT CHANGE UP
EGFR: 19 ML/MIN/1.73M2 — LOW
EGFR: 20 ML/MIN/1.73M2 — LOW
EGFR: 21 ML/MIN/1.73M2 — LOW
EGFR: 21 ML/MIN/1.73M2 — LOW
EGFR: 22 ML/MIN/1.73M2 — LOW
EGFR: 26 ML/MIN/1.73M2 — LOW
EGFR: 27 ML/MIN/1.73M2 — LOW
EOSINOPHIL # BLD AUTO: 0 K/UL — SIGNIFICANT CHANGE UP (ref 0–0.5)
EOSINOPHIL # BLD AUTO: 0.03 K/UL — SIGNIFICANT CHANGE UP (ref 0–0.5)
EOSINOPHIL # BLD AUTO: 0.03 K/UL — SIGNIFICANT CHANGE UP (ref 0–0.5)
EOSINOPHIL # BLD AUTO: 0.09 K/UL — SIGNIFICANT CHANGE UP (ref 0–0.5)
EOSINOPHIL NFR BLD AUTO: 0 % — SIGNIFICANT CHANGE UP (ref 0–6)
EOSINOPHIL NFR BLD AUTO: 0.3 % — SIGNIFICANT CHANGE UP (ref 0–6)
EOSINOPHIL NFR BLD AUTO: 0.3 % — SIGNIFICANT CHANGE UP (ref 0–6)
EOSINOPHIL NFR BLD AUTO: 1.1 % — SIGNIFICANT CHANGE UP (ref 0–6)
ESTIMATED AVERAGE GLUCOSE: 126 MG/DL — HIGH (ref 68–114)
GAS PNL BLDV: 133 MMOL/L — LOW (ref 136–145)
GAS PNL BLDV: SIGNIFICANT CHANGE UP
GIANT PLATELETS BLD QL SMEAR: PRESENT — SIGNIFICANT CHANGE UP
GLUCOSE BLDC GLUCOMTR-MCNC: 124 MG/DL — HIGH (ref 70–99)
GLUCOSE BLDC GLUCOMTR-MCNC: 141 MG/DL — HIGH (ref 70–99)
GLUCOSE BLDC GLUCOMTR-MCNC: 59 MG/DL — LOW (ref 70–99)
GLUCOSE BLDC GLUCOMTR-MCNC: 72 MG/DL — SIGNIFICANT CHANGE UP (ref 70–99)
GLUCOSE BLDC GLUCOMTR-MCNC: 74 MG/DL — SIGNIFICANT CHANGE UP (ref 70–99)
GLUCOSE BLDC GLUCOMTR-MCNC: 75 MG/DL — SIGNIFICANT CHANGE UP (ref 70–99)
GLUCOSE BLDC GLUCOMTR-MCNC: 78 MG/DL — SIGNIFICANT CHANGE UP (ref 70–99)
GLUCOSE BLDC GLUCOMTR-MCNC: 78 MG/DL — SIGNIFICANT CHANGE UP (ref 70–99)
GLUCOSE BLDC GLUCOMTR-MCNC: 92 MG/DL — SIGNIFICANT CHANGE UP (ref 70–99)
GLUCOSE SERPL-MCNC: 108 MG/DL — HIGH (ref 70–99)
GLUCOSE SERPL-MCNC: 115 MG/DL — HIGH (ref 70–99)
GLUCOSE SERPL-MCNC: 124 MG/DL — HIGH (ref 70–99)
GLUCOSE SERPL-MCNC: 63 MG/DL — LOW (ref 70–99)
GLUCOSE SERPL-MCNC: 79 MG/DL — SIGNIFICANT CHANGE UP (ref 70–99)
GLUCOSE SERPL-MCNC: 84 MG/DL — SIGNIFICANT CHANGE UP (ref 70–99)
GLUCOSE SERPL-MCNC: 91 MG/DL — SIGNIFICANT CHANGE UP (ref 70–99)
GLUCOSE SERPL-MCNC: 92 MG/DL — SIGNIFICANT CHANGE UP (ref 70–99)
GLUCOSE SERPL-MCNC: 98 MG/DL — SIGNIFICANT CHANGE UP (ref 70–99)
GLUCOSE UR QL: 250
GRAM STN FLD: SIGNIFICANT CHANGE UP
HCO3 BLDA-SCNC: 26 MMOL/L — SIGNIFICANT CHANGE UP (ref 21–28)
HCO3 BLDA-SCNC: 26 MMOL/L — SIGNIFICANT CHANGE UP (ref 21–28)
HCO3 BLDA-SCNC: 31 MMOL/L — HIGH (ref 21–28)
HCO3 BLDA-SCNC: 34 MMOL/L — HIGH (ref 21–28)
HCO3 BLDV-SCNC: 31 MMOL/L — HIGH (ref 22–29)
HCT VFR BLD CALC: 39.9 % — SIGNIFICANT CHANGE UP (ref 39–50)
HCT VFR BLD CALC: 40.1 % — SIGNIFICANT CHANGE UP (ref 39–50)
HCT VFR BLD CALC: 42.2 % — SIGNIFICANT CHANGE UP (ref 39–50)
HCT VFR BLD CALC: 43.1 % — SIGNIFICANT CHANGE UP (ref 39–50)
HCT VFR BLD CALC: 44 % — SIGNIFICANT CHANGE UP (ref 39–50)
HCT VFR BLD CALC: 44.5 % — SIGNIFICANT CHANGE UP (ref 39–50)
HCT VFR BLD CALC: 44.5 % — SIGNIFICANT CHANGE UP (ref 39–50)
HCT VFR BLD CALC: 44.8 % — SIGNIFICANT CHANGE UP (ref 39–50)
HCT VFR BLD CALC: 45.1 % — SIGNIFICANT CHANGE UP (ref 39–50)
HDLC SERPL-MCNC: 53 MG/DL — SIGNIFICANT CHANGE UP
HGB BLD-MCNC: 13.1 G/DL — SIGNIFICANT CHANGE UP (ref 13–17)
HGB BLD-MCNC: 13.3 G/DL — SIGNIFICANT CHANGE UP (ref 13–17)
HGB BLD-MCNC: 14.1 G/DL — SIGNIFICANT CHANGE UP (ref 13–17)
HGB BLD-MCNC: 14.5 G/DL — SIGNIFICANT CHANGE UP (ref 13–17)
HGB BLD-MCNC: 14.7 G/DL — SIGNIFICANT CHANGE UP (ref 13–17)
HGB BLD-MCNC: 14.8 G/DL — SIGNIFICANT CHANGE UP (ref 13–17)
HGB BLD-MCNC: 14.9 G/DL — SIGNIFICANT CHANGE UP (ref 13–17)
HGB BLD-MCNC: 15.4 G/DL — SIGNIFICANT CHANGE UP (ref 13–17)
HGB BLD-MCNC: 15.6 G/DL — SIGNIFICANT CHANGE UP (ref 13–17)
HOROWITZ INDEX BLDA+IHG-RTO: 100 — SIGNIFICANT CHANGE UP
HOROWITZ INDEX BLDA+IHG-RTO: 40 — SIGNIFICANT CHANGE UP
HOROWITZ INDEX BLDA+IHG-RTO: 60 — SIGNIFICANT CHANGE UP
HOROWITZ INDEX BLDA+IHG-RTO: 70 — SIGNIFICANT CHANGE UP
IMM GRANULOCYTES NFR BLD AUTO: 0.4 % — SIGNIFICANT CHANGE UP (ref 0–1.5)
IMM GRANULOCYTES NFR BLD AUTO: 0.5 % — SIGNIFICANT CHANGE UP (ref 0–1.5)
IMM GRANULOCYTES NFR BLD AUTO: 0.6 % — SIGNIFICANT CHANGE UP (ref 0–1.5)
KETONES UR-MCNC: NEGATIVE — SIGNIFICANT CHANGE UP
LACTATE BLDV-MCNC: 5.3 MMOL/L — CRITICAL HIGH (ref 0.5–2)
LACTATE SERPL-SCNC: 3.9 MMOL/L — HIGH (ref 0.7–2)
LACTATE SERPL-SCNC: 4 MMOL/L — CRITICAL HIGH (ref 0.7–2)
LEGIONELLA AG UR QL: NEGATIVE — SIGNIFICANT CHANGE UP
LEUKOCYTE ESTERASE UR-ACNC: NEGATIVE — SIGNIFICANT CHANGE UP
LG PLATELETS BLD QL AUTO: SLIGHT — SIGNIFICANT CHANGE UP
LIPID PNL WITH DIRECT LDL SERPL: 32 MG/DL — SIGNIFICANT CHANGE UP
LYMPHOCYTES # BLD AUTO: 0.1 K/UL — LOW (ref 1–3.3)
LYMPHOCYTES # BLD AUTO: 0.5 K/UL — LOW (ref 1–3.3)
LYMPHOCYTES # BLD AUTO: 0.67 K/UL — LOW (ref 1–3.3)
LYMPHOCYTES # BLD AUTO: 0.68 K/UL — LOW (ref 1–3.3)
LYMPHOCYTES # BLD AUTO: 0.87 K/UL — LOW (ref 1–3.3)
LYMPHOCYTES # BLD AUTO: 1 % — LOW (ref 13–44)
LYMPHOCYTES # BLD AUTO: 1.37 K/UL — SIGNIFICANT CHANGE UP (ref 1–3.3)
LYMPHOCYTES # BLD AUTO: 10.7 % — LOW (ref 13–44)
LYMPHOCYTES # BLD AUTO: 14.5 % — SIGNIFICANT CHANGE UP (ref 13–44)
LYMPHOCYTES # BLD AUTO: 3.5 % — LOW (ref 13–44)
LYMPHOCYTES # BLD AUTO: 4.8 % — LOW (ref 13–44)
LYMPHOCYTES # BLD AUTO: 5.8 % — LOW (ref 13–44)
M PNEUMO IGM SER-ACNC: 0.21 INDEX — SIGNIFICANT CHANGE UP (ref 0–0.9)
MACROCYTES BLD QL: SLIGHT — SIGNIFICANT CHANGE UP
MAGNESIUM SERPL-MCNC: 2.3 MG/DL — SIGNIFICANT CHANGE UP (ref 1.6–2.6)
MAGNESIUM SERPL-MCNC: 2.3 MG/DL — SIGNIFICANT CHANGE UP (ref 1.6–2.6)
MAGNESIUM SERPL-MCNC: 2.4 MG/DL — SIGNIFICANT CHANGE UP (ref 1.6–2.6)
MAGNESIUM SERPL-MCNC: 2.5 MG/DL — SIGNIFICANT CHANGE UP (ref 1.6–2.6)
MAGNESIUM SERPL-MCNC: 2.5 MG/DL — SIGNIFICANT CHANGE UP (ref 1.6–2.6)
MAGNESIUM SERPL-MCNC: 2.6 MG/DL — SIGNIFICANT CHANGE UP (ref 1.6–2.6)
MANUAL SMEAR VERIFICATION: SIGNIFICANT CHANGE UP
MCHC RBC-ENTMCNC: 30.1 PG — SIGNIFICANT CHANGE UP (ref 27–34)
MCHC RBC-ENTMCNC: 30.4 PG — SIGNIFICANT CHANGE UP (ref 27–34)
MCHC RBC-ENTMCNC: 30.5 PG — SIGNIFICANT CHANGE UP (ref 27–34)
MCHC RBC-ENTMCNC: 30.6 PG — SIGNIFICANT CHANGE UP (ref 27–34)
MCHC RBC-ENTMCNC: 30.6 PG — SIGNIFICANT CHANGE UP (ref 27–34)
MCHC RBC-ENTMCNC: 30.9 PG — SIGNIFICANT CHANGE UP (ref 27–34)
MCHC RBC-ENTMCNC: 30.9 PG — SIGNIFICANT CHANGE UP (ref 27–34)
MCHC RBC-ENTMCNC: 31 PG — SIGNIFICANT CHANGE UP (ref 27–34)
MCHC RBC-ENTMCNC: 31.1 PG — SIGNIFICANT CHANGE UP (ref 27–34)
MCHC RBC-ENTMCNC: 32.6 GM/DL — SIGNIFICANT CHANGE UP (ref 32–36)
MCHC RBC-ENTMCNC: 32.8 GM/DL — SIGNIFICANT CHANGE UP (ref 32–36)
MCHC RBC-ENTMCNC: 33.2 GM/DL — SIGNIFICANT CHANGE UP (ref 32–36)
MCHC RBC-ENTMCNC: 33.4 GM/DL — SIGNIFICANT CHANGE UP (ref 32–36)
MCHC RBC-ENTMCNC: 33.5 GM/DL — SIGNIFICANT CHANGE UP (ref 32–36)
MCHC RBC-ENTMCNC: 33.6 GM/DL — SIGNIFICANT CHANGE UP (ref 32–36)
MCHC RBC-ENTMCNC: 33.6 GM/DL — SIGNIFICANT CHANGE UP (ref 32–36)
MCHC RBC-ENTMCNC: 34.4 GM/DL — SIGNIFICANT CHANGE UP (ref 32–36)
MCHC RBC-ENTMCNC: 35.1 GM/DL — SIGNIFICANT CHANGE UP (ref 32–36)
MCV RBC AUTO: 88.8 FL — SIGNIFICANT CHANGE UP (ref 80–100)
MCV RBC AUTO: 89.8 FL — SIGNIFICANT CHANGE UP (ref 80–100)
MCV RBC AUTO: 90.4 FL — SIGNIFICANT CHANGE UP (ref 80–100)
MCV RBC AUTO: 90.9 FL — SIGNIFICANT CHANGE UP (ref 80–100)
MCV RBC AUTO: 92.2 FL — SIGNIFICANT CHANGE UP (ref 80–100)
MCV RBC AUTO: 92.2 FL — SIGNIFICANT CHANGE UP (ref 80–100)
MCV RBC AUTO: 92.3 FL — SIGNIFICANT CHANGE UP (ref 80–100)
MCV RBC AUTO: 92.7 FL — SIGNIFICANT CHANGE UP (ref 80–100)
MCV RBC AUTO: 92.8 FL — SIGNIFICANT CHANGE UP (ref 80–100)
METHOD TYPE: SIGNIFICANT CHANGE UP
MONOCYTES # BLD AUTO: 0.19 K/UL — SIGNIFICANT CHANGE UP (ref 0–0.9)
MONOCYTES # BLD AUTO: 0.61 K/UL — SIGNIFICANT CHANGE UP (ref 0–0.9)
MONOCYTES # BLD AUTO: 0.75 K/UL — SIGNIFICANT CHANGE UP (ref 0–0.9)
MONOCYTES # BLD AUTO: 0.78 K/UL — SIGNIFICANT CHANGE UP (ref 0–0.9)
MONOCYTES # BLD AUTO: 0.81 K/UL — SIGNIFICANT CHANGE UP (ref 0–0.9)
MONOCYTES # BLD AUTO: 0.83 K/UL — SIGNIFICANT CHANGE UP (ref 0–0.9)
MONOCYTES NFR BLD AUTO: 2 % — SIGNIFICANT CHANGE UP (ref 2–14)
MONOCYTES NFR BLD AUTO: 5.2 % — SIGNIFICANT CHANGE UP (ref 2–14)
MONOCYTES NFR BLD AUTO: 5.4 % — SIGNIFICANT CHANGE UP (ref 2–14)
MONOCYTES NFR BLD AUTO: 5.8 % — SIGNIFICANT CHANGE UP (ref 2–14)
MONOCYTES NFR BLD AUTO: 8.8 % — SIGNIFICANT CHANGE UP (ref 2–14)
MONOCYTES NFR BLD AUTO: 9.3 % — SIGNIFICANT CHANGE UP (ref 2–14)
MRSA PCR RESULT.: SIGNIFICANT CHANGE UP
MYCOPLASMA AG SPEC QL: NEGATIVE — SIGNIFICANT CHANGE UP
NEUTROPHILS # BLD AUTO: 10.26 K/UL — HIGH (ref 1.8–7.4)
NEUTROPHILS # BLD AUTO: 12.49 K/UL — HIGH (ref 1.8–7.4)
NEUTROPHILS # BLD AUTO: 13 K/UL — HIGH (ref 1.8–7.4)
NEUTROPHILS # BLD AUTO: 6.32 K/UL — SIGNIFICANT CHANGE UP (ref 1.8–7.4)
NEUTROPHILS # BLD AUTO: 7.13 K/UL — SIGNIFICANT CHANGE UP (ref 1.8–7.4)
NEUTROPHILS # BLD AUTO: 9.22 K/UL — HIGH (ref 1.8–7.4)
NEUTROPHILS NFR BLD AUTO: 75.7 % — SIGNIFICANT CHANGE UP (ref 43–77)
NEUTROPHILS NFR BLD AUTO: 78 % — HIGH (ref 43–77)
NEUTROPHILS NFR BLD AUTO: 87.9 % — HIGH (ref 43–77)
NEUTROPHILS NFR BLD AUTO: 88.9 % — HIGH (ref 43–77)
NEUTROPHILS NFR BLD AUTO: 90.5 % — HIGH (ref 43–77)
NEUTROPHILS NFR BLD AUTO: 95 % — HIGH (ref 43–77)
NITRITE UR-MCNC: NEGATIVE — SIGNIFICANT CHANGE UP
NON HDL CHOLESTEROL: 50 MG/DL — SIGNIFICANT CHANGE UP
NRBC # BLD: 0 /100 WBCS — SIGNIFICANT CHANGE UP (ref 0–0)
NRBC # BLD: 0 /100 — SIGNIFICANT CHANGE UP (ref 0–0)
NT-PROBNP SERPL-SCNC: HIGH PG/ML (ref 0–450)
ORGANISM # SPEC MICROSCOPIC CNT: SIGNIFICANT CHANGE UP
ORGANISM # SPEC MICROSCOPIC CNT: SIGNIFICANT CHANGE UP
OSMOLALITY SERPL: 307 MOSMOL/KG — HIGH (ref 280–301)
OVALOCYTES BLD QL SMEAR: SLIGHT — SIGNIFICANT CHANGE UP
PCO2 BLDA: 33 MMHG — LOW (ref 35–48)
PCO2 BLDA: 38 MMHG — SIGNIFICANT CHANGE UP (ref 35–48)
PCO2 BLDA: 41 MMHG — SIGNIFICANT CHANGE UP (ref 35–48)
PCO2 BLDA: 46 MMHG — SIGNIFICANT CHANGE UP (ref 35–48)
PCO2 BLDV: 42 MMHG — SIGNIFICANT CHANGE UP (ref 42–55)
PH BLDA: 7.41 — SIGNIFICANT CHANGE UP (ref 7.35–7.45)
PH BLDA: 7.47 — HIGH (ref 7.35–7.45)
PH BLDA: 7.51 — HIGH (ref 7.35–7.45)
PH BLDA: 7.52 — HIGH (ref 7.35–7.45)
PH BLDV: 7.48 — HIGH (ref 7.32–7.43)
PH UR: 6 — SIGNIFICANT CHANGE UP (ref 5–8)
PHOSPHATE SERPL-MCNC: 3.8 MG/DL — SIGNIFICANT CHANGE UP (ref 2.5–4.5)
PHOSPHATE SERPL-MCNC: 3.9 MG/DL — SIGNIFICANT CHANGE UP (ref 2.5–4.5)
PHOSPHATE SERPL-MCNC: 4 MG/DL — SIGNIFICANT CHANGE UP (ref 2.5–4.5)
PHOSPHATE SERPL-MCNC: 4.1 MG/DL — SIGNIFICANT CHANGE UP (ref 2.5–4.5)
PHOSPHATE SERPL-MCNC: 4.1 MG/DL — SIGNIFICANT CHANGE UP (ref 2.5–4.5)
PHOSPHATE SERPL-MCNC: 5.1 MG/DL — HIGH (ref 2.5–4.5)
PLAT MORPH BLD: NORMAL — SIGNIFICANT CHANGE UP
PLATELET # BLD AUTO: 155 K/UL — SIGNIFICANT CHANGE UP (ref 150–400)
PLATELET # BLD AUTO: 159 K/UL — SIGNIFICANT CHANGE UP (ref 150–400)
PLATELET # BLD AUTO: 174 K/UL — SIGNIFICANT CHANGE UP (ref 150–400)
PLATELET # BLD AUTO: 181 K/UL — SIGNIFICANT CHANGE UP (ref 150–400)
PLATELET # BLD AUTO: 182 K/UL — SIGNIFICANT CHANGE UP (ref 150–400)
PLATELET # BLD AUTO: 186 K/UL — SIGNIFICANT CHANGE UP (ref 150–400)
PLATELET # BLD AUTO: 207 K/UL — SIGNIFICANT CHANGE UP (ref 150–400)
PLATELET # BLD AUTO: 220 K/UL — SIGNIFICANT CHANGE UP (ref 150–400)
PLATELET # BLD AUTO: 230 K/UL — SIGNIFICANT CHANGE UP (ref 150–400)
PLATELET CLUMP BLD QL SMEAR: SLIGHT
PO2 BLDA: 122 MMHG — HIGH (ref 83–108)
PO2 BLDA: 127 MMHG — HIGH (ref 83–108)
PO2 BLDA: 150 MMHG — HIGH (ref 83–108)
PO2 BLDA: 97 MMHG — SIGNIFICANT CHANGE UP (ref 83–108)
PO2 BLDV: 33 MMHG — SIGNIFICANT CHANGE UP
POIKILOCYTOSIS BLD QL AUTO: SIGNIFICANT CHANGE UP
POLYCHROMASIA BLD QL SMEAR: SLIGHT — SIGNIFICANT CHANGE UP
POTASSIUM BLDV-SCNC: 5.2 MMOL/L — HIGH (ref 3.5–5.1)
POTASSIUM SERPL-MCNC: 3.9 MMOL/L — SIGNIFICANT CHANGE UP (ref 3.5–5.3)
POTASSIUM SERPL-MCNC: 4 MMOL/L — SIGNIFICANT CHANGE UP (ref 3.5–5.3)
POTASSIUM SERPL-MCNC: 4 MMOL/L — SIGNIFICANT CHANGE UP (ref 3.5–5.3)
POTASSIUM SERPL-MCNC: 4.5 MMOL/L — SIGNIFICANT CHANGE UP (ref 3.5–5.3)
POTASSIUM SERPL-MCNC: 4.6 MMOL/L — SIGNIFICANT CHANGE UP (ref 3.5–5.3)
POTASSIUM SERPL-MCNC: 4.7 MMOL/L — SIGNIFICANT CHANGE UP (ref 3.5–5.3)
POTASSIUM SERPL-MCNC: 4.8 MMOL/L — SIGNIFICANT CHANGE UP (ref 3.5–5.3)
POTASSIUM SERPL-MCNC: 5 MMOL/L — SIGNIFICANT CHANGE UP (ref 3.5–5.3)
POTASSIUM SERPL-MCNC: 5.1 MMOL/L — SIGNIFICANT CHANGE UP (ref 3.5–5.3)
POTASSIUM SERPL-SCNC: 3.9 MMOL/L — SIGNIFICANT CHANGE UP (ref 3.5–5.3)
POTASSIUM SERPL-SCNC: 4 MMOL/L — SIGNIFICANT CHANGE UP (ref 3.5–5.3)
POTASSIUM SERPL-SCNC: 4 MMOL/L — SIGNIFICANT CHANGE UP (ref 3.5–5.3)
POTASSIUM SERPL-SCNC: 4.5 MMOL/L — SIGNIFICANT CHANGE UP (ref 3.5–5.3)
POTASSIUM SERPL-SCNC: 4.6 MMOL/L — SIGNIFICANT CHANGE UP (ref 3.5–5.3)
POTASSIUM SERPL-SCNC: 4.7 MMOL/L — SIGNIFICANT CHANGE UP (ref 3.5–5.3)
POTASSIUM SERPL-SCNC: 4.8 MMOL/L — SIGNIFICANT CHANGE UP (ref 3.5–5.3)
POTASSIUM SERPL-SCNC: 5 MMOL/L — SIGNIFICANT CHANGE UP (ref 3.5–5.3)
POTASSIUM SERPL-SCNC: 5.1 MMOL/L — SIGNIFICANT CHANGE UP (ref 3.5–5.3)
PROCALCITONIN SERPL-MCNC: 0.16 NG/ML — HIGH (ref 0.02–0.1)
PROT SERPL-MCNC: 5.5 G/DL — LOW (ref 6–8.3)
PROT SERPL-MCNC: 5.7 G/DL — LOW (ref 6–8.3)
PROT SERPL-MCNC: 6.1 G/DL — SIGNIFICANT CHANGE UP (ref 6–8.3)
PROT SERPL-MCNC: 6.5 G/DL — SIGNIFICANT CHANGE UP (ref 6–8.3)
PROT SERPL-MCNC: 6.6 G/DL — SIGNIFICANT CHANGE UP (ref 6–8.3)
PROT UR-MCNC: NEGATIVE — SIGNIFICANT CHANGE UP
RAPID RVP RESULT: SIGNIFICANT CHANGE UP
RAPID RVP RESULT: SIGNIFICANT CHANGE UP
RBC # BLD: 4.3 M/UL — SIGNIFICANT CHANGE UP (ref 4.2–5.8)
RBC # BLD: 4.35 M/UL — SIGNIFICANT CHANGE UP (ref 4.2–5.8)
RBC # BLD: 4.57 M/UL — SIGNIFICANT CHANGE UP (ref 4.2–5.8)
RBC # BLD: 4.77 M/UL — SIGNIFICANT CHANGE UP (ref 4.2–5.8)
RBC # BLD: 4.8 M/UL — SIGNIFICANT CHANGE UP (ref 4.2–5.8)
RBC # BLD: 4.84 M/UL — SIGNIFICANT CHANGE UP (ref 4.2–5.8)
RBC # BLD: 4.89 M/UL — SIGNIFICANT CHANGE UP (ref 4.2–5.8)
RBC # BLD: 4.99 M/UL — SIGNIFICANT CHANGE UP (ref 4.2–5.8)
RBC # BLD: 5.01 M/UL — SIGNIFICANT CHANGE UP (ref 4.2–5.8)
RBC # FLD: 17.9 % — HIGH (ref 10.3–14.5)
RBC # FLD: 18.2 % — HIGH (ref 10.3–14.5)
RBC # FLD: 18.5 % — HIGH (ref 10.3–14.5)
RBC # FLD: 18.5 % — HIGH (ref 10.3–14.5)
RBC # FLD: 18.6 % — HIGH (ref 10.3–14.5)
RBC # FLD: 18.8 % — HIGH (ref 10.3–14.5)
RBC # FLD: 18.9 % — HIGH (ref 10.3–14.5)
RBC # FLD: 18.9 % — HIGH (ref 10.3–14.5)
RBC # FLD: 19.4 % — HIGH (ref 10.3–14.5)
RBC BLD AUTO: ABNORMAL
S AUREUS DNA NOSE QL NAA+PROBE: SIGNIFICANT CHANGE UP
SAO2 % BLDA: 98 % — SIGNIFICANT CHANGE UP
SAO2 % BLDA: 99 % — SIGNIFICANT CHANGE UP
SAO2 % BLDV: 51 % — SIGNIFICANT CHANGE UP
SARS-COV-2 RNA SPEC QL NAA+PROBE: SIGNIFICANT CHANGE UP
SMUDGE CELLS # BLD: PRESENT — SIGNIFICANT CHANGE UP
SODIUM SERPL-SCNC: 136 MMOL/L — SIGNIFICANT CHANGE UP (ref 135–145)
SODIUM SERPL-SCNC: 136 MMOL/L — SIGNIFICANT CHANGE UP (ref 135–145)
SODIUM SERPL-SCNC: 138 MMOL/L — SIGNIFICANT CHANGE UP (ref 135–145)
SODIUM SERPL-SCNC: 139 MMOL/L — SIGNIFICANT CHANGE UP (ref 135–145)
SODIUM SERPL-SCNC: 139 MMOL/L — SIGNIFICANT CHANGE UP (ref 135–145)
SODIUM SERPL-SCNC: 140 MMOL/L — SIGNIFICANT CHANGE UP (ref 135–145)
SP GR SPEC: 1.01 — SIGNIFICANT CHANGE UP (ref 1.01–1.02)
SPECIMEN SOURCE: SIGNIFICANT CHANGE UP
TRIGL SERPL-MCNC: 91 MG/DL — SIGNIFICANT CHANGE UP
TROPONIN I, HIGH SENSITIVITY RESULT: 548.3 NG/L — HIGH
TROPONIN I, HIGH SENSITIVITY RESULT: 559.3 NG/L — HIGH
TROPONIN I, HIGH SENSITIVITY RESULT: 594 NG/L — HIGH
TROPONIN I, HIGH SENSITIVITY RESULT: 638.1 NG/L — HIGH
TROPONIN I, HIGH SENSITIVITY RESULT: 805.5 NG/L — HIGH
TROPONIN I, HIGH SENSITIVITY RESULT: 8103.6 NG/L — HIGH
UROBILINOGEN FLD QL: NEGATIVE — SIGNIFICANT CHANGE UP
VARIANT LYMPHS # BLD: 2 % — SIGNIFICANT CHANGE UP (ref 0–6)
WBC # BLD: 10.03 K/UL — SIGNIFICANT CHANGE UP (ref 3.8–10.5)
WBC # BLD: 11.65 K/UL — HIGH (ref 3.8–10.5)
WBC # BLD: 11.67 K/UL — HIGH (ref 3.8–10.5)
WBC # BLD: 14.05 K/UL — HIGH (ref 3.8–10.5)
WBC # BLD: 14.36 K/UL — HIGH (ref 3.8–10.5)
WBC # BLD: 7.15 K/UL — SIGNIFICANT CHANGE UP (ref 3.8–10.5)
WBC # BLD: 8.1 K/UL — SIGNIFICANT CHANGE UP (ref 3.8–10.5)
WBC # BLD: 9.43 K/UL — SIGNIFICANT CHANGE UP (ref 3.8–10.5)
WBC # BLD: 9.71 K/UL — SIGNIFICANT CHANGE UP (ref 3.8–10.5)
WBC # FLD AUTO: 10.03 K/UL — SIGNIFICANT CHANGE UP (ref 3.8–10.5)
WBC # FLD AUTO: 11.65 K/UL — HIGH (ref 3.8–10.5)
WBC # FLD AUTO: 11.67 K/UL — HIGH (ref 3.8–10.5)
WBC # FLD AUTO: 14.05 K/UL — HIGH (ref 3.8–10.5)
WBC # FLD AUTO: 14.36 K/UL — HIGH (ref 3.8–10.5)
WBC # FLD AUTO: 7.15 K/UL — SIGNIFICANT CHANGE UP (ref 3.8–10.5)
WBC # FLD AUTO: 8.1 K/UL — SIGNIFICANT CHANGE UP (ref 3.8–10.5)
WBC # FLD AUTO: 9.43 K/UL — SIGNIFICANT CHANGE UP (ref 3.8–10.5)
WBC # FLD AUTO: 9.71 K/UL — SIGNIFICANT CHANGE UP (ref 3.8–10.5)

## 2022-01-01 PROCEDURE — 93306 TTE W/DOPPLER COMPLETE: CPT

## 2022-01-01 PROCEDURE — 71045 X-RAY EXAM CHEST 1 VIEW: CPT | Mod: 26

## 2022-01-01 PROCEDURE — 80053 COMPREHEN METABOLIC PANEL: CPT

## 2022-01-01 PROCEDURE — 93010 ELECTROCARDIOGRAM REPORT: CPT

## 2022-01-01 PROCEDURE — 81003 URINALYSIS AUTO W/O SCOPE: CPT

## 2022-01-01 PROCEDURE — 70450 CT HEAD/BRAIN W/O DYE: CPT

## 2022-01-01 PROCEDURE — 87449 NOS EACH ORGANISM AG IA: CPT

## 2022-01-01 PROCEDURE — 87641 MR-STAPH DNA AMP PROBE: CPT

## 2022-01-01 PROCEDURE — 83735 ASSAY OF MAGNESIUM: CPT

## 2022-01-01 PROCEDURE — 87186 SC STD MICRODIL/AGAR DIL: CPT

## 2022-01-01 PROCEDURE — 71045 X-RAY EXAM CHEST 1 VIEW: CPT

## 2022-01-01 PROCEDURE — 82330 ASSAY OF CALCIUM: CPT

## 2022-01-01 PROCEDURE — 83605 ASSAY OF LACTIC ACID: CPT

## 2022-01-01 PROCEDURE — 87635 SARS-COV-2 COVID-19 AMP PRB: CPT

## 2022-01-01 PROCEDURE — 87070 CULTURE OTHR SPECIMN AEROBIC: CPT

## 2022-01-01 PROCEDURE — 99223 1ST HOSP IP/OBS HIGH 75: CPT

## 2022-01-01 PROCEDURE — 85379 FIBRIN DEGRADATION QUANT: CPT

## 2022-01-01 PROCEDURE — 83930 ASSAY OF BLOOD OSMOLALITY: CPT

## 2022-01-01 PROCEDURE — 99285 EMERGENCY DEPT VISIT HI MDM: CPT

## 2022-01-01 PROCEDURE — 85027 COMPLETE CBC AUTOMATED: CPT

## 2022-01-01 PROCEDURE — 94002 VENT MGMT INPAT INIT DAY: CPT

## 2022-01-01 PROCEDURE — 94003 VENT MGMT INPAT SUBQ DAY: CPT

## 2022-01-01 PROCEDURE — 99497 ADVNCD CARE PLAN 30 MIN: CPT

## 2022-01-01 PROCEDURE — 94760 N-INVAS EAR/PLS OXIMETRY 1: CPT

## 2022-01-01 PROCEDURE — 87040 BLOOD CULTURE FOR BACTERIA: CPT

## 2022-01-01 PROCEDURE — 87640 STAPH A DNA AMP PROBE: CPT

## 2022-01-01 PROCEDURE — 84295 ASSAY OF SERUM SODIUM: CPT

## 2022-01-01 PROCEDURE — 71045 X-RAY EXAM CHEST 1 VIEW: CPT | Mod: 26,77,76

## 2022-01-01 PROCEDURE — 0225U NFCT DS DNA&RNA 21 SARSCOV2: CPT

## 2022-01-01 PROCEDURE — 83036 HEMOGLOBIN GLYCOSYLATED A1C: CPT

## 2022-01-01 PROCEDURE — 83880 ASSAY OF NATRIURETIC PEPTIDE: CPT

## 2022-01-01 PROCEDURE — 93005 ELECTROCARDIOGRAM TRACING: CPT

## 2022-01-01 PROCEDURE — 82962 GLUCOSE BLOOD TEST: CPT

## 2022-01-01 PROCEDURE — 87077 CULTURE AEROBIC IDENTIFY: CPT

## 2022-01-01 PROCEDURE — 70450 CT HEAD/BRAIN W/O DYE: CPT | Mod: 26

## 2022-01-01 PROCEDURE — 80048 BASIC METABOLIC PNL TOTAL CA: CPT

## 2022-01-01 PROCEDURE — 36415 COLL VENOUS BLD VENIPUNCTURE: CPT

## 2022-01-01 PROCEDURE — 94640 AIRWAY INHALATION TREATMENT: CPT

## 2022-01-01 PROCEDURE — 84484 ASSAY OF TROPONIN QUANT: CPT

## 2022-01-01 PROCEDURE — 99222 1ST HOSP IP/OBS MODERATE 55: CPT

## 2022-01-01 PROCEDURE — 94660 CPAP INITIATION&MGMT: CPT

## 2022-01-01 PROCEDURE — 82803 BLOOD GASES ANY COMBINATION: CPT

## 2022-01-01 PROCEDURE — 84100 ASSAY OF PHOSPHORUS: CPT

## 2022-01-01 PROCEDURE — 85025 COMPLETE CBC W/AUTO DIFF WBC: CPT

## 2022-01-01 PROCEDURE — 84132 ASSAY OF SERUM POTASSIUM: CPT

## 2022-01-01 PROCEDURE — 85730 THROMBOPLASTIN TIME PARTIAL: CPT

## 2022-01-01 PROCEDURE — 99053 MED SERV 10PM-8AM 24 HR FAC: CPT

## 2022-01-01 PROCEDURE — 99497 ADVNCD CARE PLAN 30 MIN: CPT | Mod: 25

## 2022-01-01 PROCEDURE — 80061 LIPID PANEL: CPT

## 2022-01-01 PROCEDURE — 84145 PROCALCITONIN (PCT): CPT

## 2022-01-01 PROCEDURE — 86738 MYCOPLASMA ANTIBODY: CPT

## 2022-01-01 RX ORDER — FAMOTIDINE 10 MG/ML
20 INJECTION INTRAVENOUS DAILY
Refills: 0 | Status: DISCONTINUED | OUTPATIENT
Start: 2022-01-01 | End: 2022-01-01

## 2022-01-01 RX ORDER — ROBINUL 0.2 MG/ML
0.4 INJECTION INTRAMUSCULAR; INTRAVENOUS
Qty: 0 | Refills: 0 | DISCHARGE
Start: 2022-01-01

## 2022-01-01 RX ORDER — METOPROLOL TARTRATE 50 MG
50 TABLET ORAL
Refills: 0 | Status: DISCONTINUED | OUTPATIENT
Start: 2022-01-01 | End: 2022-01-01

## 2022-01-01 RX ORDER — ALBUTEROL 90 UG/1
2 AEROSOL, METERED ORAL EVERY 6 HOURS
Refills: 0 | Status: DISCONTINUED | OUTPATIENT
Start: 2022-01-01 | End: 2022-01-01

## 2022-01-01 RX ORDER — FUROSEMIDE 40 MG
60 TABLET ORAL ONCE
Refills: 0 | Status: COMPLETED | OUTPATIENT
Start: 2022-01-01 | End: 2022-01-01

## 2022-01-01 RX ORDER — HEPARIN SODIUM 5000 [USP'U]/ML
3000 INJECTION INTRAVENOUS; SUBCUTANEOUS EVERY 6 HOURS
Refills: 0 | Status: DISCONTINUED | OUTPATIENT
Start: 2022-01-01 | End: 2022-01-01

## 2022-01-01 RX ORDER — CHLORHEXIDINE GLUCONATE 213 G/1000ML
1 SOLUTION TOPICAL DAILY
Refills: 0 | Status: DISCONTINUED | OUTPATIENT
Start: 2022-01-01 | End: 2022-01-01

## 2022-01-01 RX ORDER — LIDOCAINE 4 G/100G
1 CREAM TOPICAL DAILY
Refills: 0 | Status: DISCONTINUED | OUTPATIENT
Start: 2022-01-01 | End: 2022-01-01

## 2022-01-01 RX ORDER — OMEGA-3 ACID ETHYL ESTERS 1 G
1 CAPSULE ORAL
Qty: 0 | Refills: 0 | DISCHARGE

## 2022-01-01 RX ORDER — HEPARIN SODIUM 5000 [USP'U]/ML
5000 INJECTION INTRAVENOUS; SUBCUTANEOUS EVERY 8 HOURS
Refills: 0 | Status: DISCONTINUED | OUTPATIENT
Start: 2022-01-01 | End: 2022-01-01

## 2022-01-01 RX ORDER — FAMOTIDINE 10 MG/ML
1 INJECTION INTRAVENOUS
Qty: 0 | Refills: 0 | DISCHARGE

## 2022-01-01 RX ORDER — DEXTROSE 50 % IN WATER 50 %
50 SYRINGE (ML) INTRAVENOUS ONCE
Refills: 0 | Status: COMPLETED | OUTPATIENT
Start: 2022-01-01 | End: 2022-01-01

## 2022-01-01 RX ORDER — AZITHROMYCIN 500 MG/1
500 TABLET, FILM COATED ORAL EVERY 24 HOURS
Refills: 0 | Status: DISCONTINUED | OUTPATIENT
Start: 2022-01-01 | End: 2022-01-01

## 2022-01-01 RX ORDER — APIXABAN 2.5 MG/1
2.5 TABLET, FILM COATED ORAL
Refills: 0 | Status: DISCONTINUED | OUTPATIENT
Start: 2022-01-01 | End: 2022-01-01

## 2022-01-01 RX ORDER — ACETYLCYSTEINE 200 MG/ML
4 VIAL (ML) MISCELLANEOUS ONCE
Refills: 0 | Status: DISCONTINUED | OUTPATIENT
Start: 2022-01-01 | End: 2022-01-01

## 2022-01-01 RX ORDER — ASPIRIN/CALCIUM CARB/MAGNESIUM 324 MG
1 TABLET ORAL
Qty: 0 | Refills: 0 | DISCHARGE

## 2022-01-01 RX ORDER — ROBINUL 0.2 MG/ML
0.4 INJECTION INTRAMUSCULAR; INTRAVENOUS EVERY 6 HOURS
Refills: 0 | Status: DISCONTINUED | OUTPATIENT
Start: 2022-01-01 | End: 2022-01-01

## 2022-01-01 RX ORDER — METOPROLOL TARTRATE 50 MG
1 TABLET ORAL
Qty: 0 | Refills: 0 | DISCHARGE

## 2022-01-01 RX ORDER — BUMETANIDE 0.25 MG/ML
2 INJECTION INTRAMUSCULAR; INTRAVENOUS EVERY 12 HOURS
Refills: 0 | Status: DISCONTINUED | OUTPATIENT
Start: 2022-01-01 | End: 2022-01-01

## 2022-01-01 RX ORDER — FUROSEMIDE 40 MG
5 TABLET ORAL
Qty: 100 | Refills: 0 | Status: DISCONTINUED | OUTPATIENT
Start: 2022-01-01 | End: 2022-01-01

## 2022-01-01 RX ORDER — ISOSORBIDE MONONITRATE 60 MG/1
1 TABLET, EXTENDED RELEASE ORAL
Qty: 0 | Refills: 0 | DISCHARGE

## 2022-01-01 RX ORDER — FUROSEMIDE 40 MG
60 TABLET ORAL ONCE
Refills: 0 | Status: DISCONTINUED | OUTPATIENT
Start: 2022-01-01 | End: 2022-01-01

## 2022-01-01 RX ORDER — FUROSEMIDE 40 MG
20 TABLET ORAL ONCE
Refills: 0 | Status: COMPLETED | OUTPATIENT
Start: 2022-01-01 | End: 2022-01-01

## 2022-01-01 RX ORDER — AZITHROMYCIN 500 MG/1
TABLET, FILM COATED ORAL
Refills: 0 | Status: DISCONTINUED | OUTPATIENT
Start: 2022-01-01 | End: 2022-01-01

## 2022-01-01 RX ORDER — METOPROLOL TARTRATE 50 MG
12.5 TABLET ORAL EVERY 12 HOURS
Refills: 0 | Status: DISCONTINUED | OUTPATIENT
Start: 2022-01-01 | End: 2022-01-01

## 2022-01-01 RX ORDER — MORPHINE SULFATE 50 MG/1
2 CAPSULE, EXTENDED RELEASE ORAL
Qty: 0 | Refills: 0 | DISCHARGE
Start: 2022-01-01

## 2022-01-01 RX ORDER — FENTANYL CITRATE 50 UG/ML
50 INJECTION INTRAVENOUS
Refills: 0 | Status: DISCONTINUED | OUTPATIENT
Start: 2022-01-01 | End: 2022-01-01

## 2022-01-01 RX ORDER — ASPIRIN/CALCIUM CARB/MAGNESIUM 324 MG
81 TABLET ORAL DAILY
Refills: 0 | Status: DISCONTINUED | OUTPATIENT
Start: 2022-01-01 | End: 2022-01-01

## 2022-01-01 RX ORDER — FUROSEMIDE 40 MG
10 TABLET ORAL
Qty: 100 | Refills: 0 | Status: DISCONTINUED | OUTPATIENT
Start: 2022-01-01 | End: 2022-01-01

## 2022-01-01 RX ORDER — SIMVASTATIN 20 MG/1
1 TABLET, FILM COATED ORAL
Qty: 0 | Refills: 0 | DISCHARGE

## 2022-01-01 RX ORDER — SIMVASTATIN 20 MG/1
10 TABLET, FILM COATED ORAL AT BEDTIME
Refills: 0 | Status: DISCONTINUED | OUTPATIENT
Start: 2022-01-01 | End: 2022-01-01

## 2022-01-01 RX ORDER — HYDROMORPHONE HYDROCHLORIDE 2 MG/ML
0.5 INJECTION INTRAMUSCULAR; INTRAVENOUS; SUBCUTANEOUS
Refills: 0 | Status: DISCONTINUED | OUTPATIENT
Start: 2022-01-01 | End: 2022-03-16

## 2022-01-01 RX ORDER — ACETAMINOPHEN 500 MG
2 TABLET ORAL
Qty: 0 | Refills: 0 | DISCHARGE

## 2022-01-01 RX ORDER — HEPARIN SODIUM 5000 [USP'U]/ML
INJECTION INTRAVENOUS; SUBCUTANEOUS
Qty: 25000 | Refills: 0 | Status: DISCONTINUED | OUTPATIENT
Start: 2022-01-01 | End: 2022-01-01

## 2022-01-01 RX ORDER — HEPARIN SODIUM 5000 [USP'U]/ML
6000 INJECTION INTRAVENOUS; SUBCUTANEOUS EVERY 6 HOURS
Refills: 0 | Status: DISCONTINUED | OUTPATIENT
Start: 2022-01-01 | End: 2022-01-01

## 2022-01-01 RX ORDER — ACETAMINOPHEN 500 MG
650 TABLET ORAL EVERY 6 HOURS
Refills: 0 | Status: DISCONTINUED | OUTPATIENT
Start: 2022-01-01 | End: 2022-01-01

## 2022-01-01 RX ORDER — ROBINUL 0.2 MG/ML
0.2 INJECTION INTRAMUSCULAR; INTRAVENOUS ONCE
Refills: 0 | Status: COMPLETED | OUTPATIENT
Start: 2022-01-01 | End: 2022-01-01

## 2022-01-01 RX ORDER — ISOSORBIDE MONONITRATE 60 MG/1
30 TABLET, EXTENDED RELEASE ORAL DAILY
Refills: 0 | Status: DISCONTINUED | OUTPATIENT
Start: 2022-01-01 | End: 2022-01-01

## 2022-01-01 RX ORDER — DOXAZOSIN MESYLATE 4 MG
2 TABLET ORAL AT BEDTIME
Refills: 0 | Status: DISCONTINUED | OUTPATIENT
Start: 2022-01-01 | End: 2022-01-01

## 2022-01-01 RX ORDER — FUROSEMIDE 40 MG
1 TABLET ORAL
Qty: 0 | Refills: 0 | DISCHARGE

## 2022-01-01 RX ORDER — ROBINUL 0.2 MG/ML
0.4 INJECTION INTRAMUSCULAR; INTRAVENOUS
Refills: 0 | Status: DISCONTINUED | OUTPATIENT
Start: 2022-01-01 | End: 2022-03-16

## 2022-01-01 RX ORDER — DEXMEDETOMIDINE HYDROCHLORIDE IN 0.9% SODIUM CHLORIDE 4 UG/ML
0.5 INJECTION INTRAVENOUS
Qty: 200 | Refills: 0 | Status: DISCONTINUED | OUTPATIENT
Start: 2022-01-01 | End: 2022-01-01

## 2022-01-01 RX ORDER — HALOPERIDOL DECANOATE 100 MG/ML
2.5 INJECTION INTRAMUSCULAR ONCE
Refills: 0 | Status: COMPLETED | OUTPATIENT
Start: 2022-01-01 | End: 2022-01-01

## 2022-01-01 RX ORDER — ETOMIDATE 2 MG/ML
20 INJECTION INTRAVENOUS ONCE
Refills: 0 | Status: COMPLETED | OUTPATIENT
Start: 2022-01-01 | End: 2022-01-01

## 2022-01-01 RX ORDER — IPRATROPIUM/ALBUTEROL SULFATE 18-103MCG
3 AEROSOL WITH ADAPTER (GRAM) INHALATION EVERY 6 HOURS
Refills: 0 | Status: DISCONTINUED | OUTPATIENT
Start: 2022-01-01 | End: 2022-01-01

## 2022-01-01 RX ORDER — ASPIRIN/CALCIUM CARB/MAGNESIUM 324 MG
162 TABLET ORAL DAILY
Refills: 0 | Status: DISCONTINUED | OUTPATIENT
Start: 2022-01-01 | End: 2022-01-01

## 2022-01-01 RX ORDER — PROPOFOL 10 MG/ML
5 INJECTION, EMULSION INTRAVENOUS
Qty: 1000 | Refills: 0 | Status: DISCONTINUED | OUTPATIENT
Start: 2022-01-01 | End: 2022-01-01

## 2022-01-01 RX ORDER — NOREPINEPHRINE BITARTRATE/D5W 8 MG/250ML
0.05 PLASTIC BAG, INJECTION (ML) INTRAVENOUS
Qty: 8 | Refills: 0 | Status: DISCONTINUED | OUTPATIENT
Start: 2022-01-01 | End: 2022-01-01

## 2022-01-01 RX ORDER — APIXABAN 2.5 MG/1
1 TABLET, FILM COATED ORAL
Qty: 0 | Refills: 0 | DISCHARGE

## 2022-01-01 RX ORDER — CEFTRIAXONE 500 MG/1
1000 INJECTION, POWDER, FOR SOLUTION INTRAMUSCULAR; INTRAVENOUS EVERY 24 HOURS
Refills: 0 | Status: COMPLETED | OUTPATIENT
Start: 2022-01-01 | End: 2022-01-01

## 2022-01-01 RX ORDER — FUROSEMIDE 40 MG
60 TABLET ORAL DAILY
Refills: 0 | Status: DISCONTINUED | OUTPATIENT
Start: 2022-01-01 | End: 2022-01-01

## 2022-01-01 RX ORDER — HALOPERIDOL DECANOATE 100 MG/ML
2 INJECTION INTRAMUSCULAR ONCE
Refills: 0 | Status: DISCONTINUED | OUTPATIENT
Start: 2022-01-01 | End: 2022-01-01

## 2022-01-01 RX ORDER — FUROSEMIDE 40 MG
60 TABLET ORAL
Qty: 0 | Refills: 0 | DISCHARGE

## 2022-01-01 RX ORDER — FUROSEMIDE 40 MG
40 TABLET ORAL EVERY 12 HOURS
Refills: 0 | Status: DISCONTINUED | OUTPATIENT
Start: 2022-01-01 | End: 2022-01-01

## 2022-01-01 RX ORDER — METOPROLOL TARTRATE 50 MG
25 TABLET ORAL EVERY 8 HOURS
Refills: 0 | Status: DISCONTINUED | OUTPATIENT
Start: 2022-01-01 | End: 2022-01-01

## 2022-01-01 RX ORDER — METOPROLOL TARTRATE 50 MG
2.5 TABLET ORAL ONCE
Refills: 0 | Status: DISCONTINUED | OUTPATIENT
Start: 2022-01-01 | End: 2022-01-01

## 2022-01-01 RX ORDER — MORPHINE SULFATE 50 MG/1
2 CAPSULE, EXTENDED RELEASE ORAL
Refills: 0 | Status: DISCONTINUED | OUTPATIENT
Start: 2022-01-01 | End: 2022-01-01

## 2022-01-01 RX ORDER — FENTANYL CITRATE 50 UG/ML
2 INJECTION INTRAVENOUS
Qty: 2500 | Refills: 0 | Status: DISCONTINUED | OUTPATIENT
Start: 2022-01-01 | End: 2022-01-01

## 2022-01-01 RX ORDER — IPRATROPIUM BROMIDE 0.2 MG/ML
1 SOLUTION, NON-ORAL INHALATION EVERY 6 HOURS
Refills: 0 | Status: DISCONTINUED | OUTPATIENT
Start: 2022-01-01 | End: 2022-01-01

## 2022-01-01 RX ORDER — CALCIUM CARBONATE 500(1250)
2 TABLET ORAL
Qty: 0 | Refills: 0 | DISCHARGE

## 2022-01-01 RX ORDER — HYDROMORPHONE HYDROCHLORIDE 2 MG/ML
0.5 INJECTION INTRAMUSCULAR; INTRAVENOUS; SUBCUTANEOUS ONCE
Refills: 0 | Status: DISCONTINUED | OUTPATIENT
Start: 2022-01-01 | End: 2022-01-01

## 2022-01-01 RX ORDER — CHOLECALCIFEROL (VITAMIN D3) 125 MCG
1 CAPSULE ORAL
Qty: 0 | Refills: 0 | DISCHARGE

## 2022-01-01 RX ORDER — INSULIN LISPRO 100/ML
VIAL (ML) SUBCUTANEOUS
Refills: 0 | Status: DISCONTINUED | OUTPATIENT
Start: 2022-01-01 | End: 2022-01-01

## 2022-01-01 RX ORDER — LIDOCAINE 4 G/100G
1 CREAM TOPICAL ONCE
Refills: 0 | Status: COMPLETED | OUTPATIENT
Start: 2022-01-01 | End: 2022-01-01

## 2022-01-01 RX ORDER — AZITHROMYCIN 500 MG/1
500 TABLET, FILM COATED ORAL ONCE
Refills: 0 | Status: COMPLETED | OUTPATIENT
Start: 2022-01-01 | End: 2022-01-01

## 2022-01-01 RX ORDER — CHLORHEXIDINE GLUCONATE 213 G/1000ML
15 SOLUTION TOPICAL EVERY 12 HOURS
Refills: 0 | Status: DISCONTINUED | OUTPATIENT
Start: 2022-01-01 | End: 2022-01-01

## 2022-01-01 RX ORDER — FUROSEMIDE 40 MG
60 TABLET ORAL EVERY 12 HOURS
Refills: 0 | Status: DISCONTINUED | OUTPATIENT
Start: 2022-01-01 | End: 2022-01-01

## 2022-01-01 RX ORDER — TAMSULOSIN HYDROCHLORIDE 0.4 MG/1
0.8 CAPSULE ORAL AT BEDTIME
Refills: 0 | Status: DISCONTINUED | OUTPATIENT
Start: 2022-01-01 | End: 2022-01-01

## 2022-01-01 RX ORDER — CHOLECALCIFEROL (VITAMIN D3) 125 MCG
1000 CAPSULE ORAL DAILY
Refills: 0 | Status: DISCONTINUED | OUTPATIENT
Start: 2022-01-01 | End: 2022-01-01

## 2022-01-01 RX ORDER — ACETAMINOPHEN 500 MG
650 TABLET ORAL EVERY 6 HOURS
Refills: 0 | Status: DISCONTINUED | OUTPATIENT
Start: 2022-01-01 | End: 2022-03-16

## 2022-01-01 RX ADMIN — LIDOCAINE 1 PATCH: 4 CREAM TOPICAL at 12:05

## 2022-01-01 RX ADMIN — CHLORHEXIDINE GLUCONATE 1 APPLICATION(S): 213 SOLUTION TOPICAL at 13:17

## 2022-01-01 RX ADMIN — Medication 25 MILLIGRAM(S): at 21:48

## 2022-01-01 RX ADMIN — APIXABAN 2.5 MILLIGRAM(S): 2.5 TABLET, FILM COATED ORAL at 06:28

## 2022-01-01 RX ADMIN — MORPHINE SULFATE 2 MILLIGRAM(S): 50 CAPSULE, EXTENDED RELEASE ORAL at 08:52

## 2022-01-01 RX ADMIN — Medication 6.73 MICROGRAM(S)/KG/MIN: at 05:39

## 2022-01-01 RX ADMIN — LIDOCAINE 1 PATCH: 4 CREAM TOPICAL at 19:50

## 2022-01-01 RX ADMIN — Medication 6.73 MICROGRAM(S)/KG/MIN: at 00:34

## 2022-01-01 RX ADMIN — FENTANYL CITRATE 50 MICROGRAM(S): 50 INJECTION INTRAVENOUS at 17:12

## 2022-01-01 RX ADMIN — TAMSULOSIN HYDROCHLORIDE 0.8 MILLIGRAM(S): 0.4 CAPSULE ORAL at 21:49

## 2022-01-01 RX ADMIN — Medication 40 MILLIGRAM(S): at 17:27

## 2022-01-01 RX ADMIN — Medication 60 MILLIGRAM(S): at 12:47

## 2022-01-01 RX ADMIN — Medication 1 PUFF(S): at 15:25

## 2022-01-01 RX ADMIN — CHLORHEXIDINE GLUCONATE 1 APPLICATION(S): 213 SOLUTION TOPICAL at 17:25

## 2022-01-01 RX ADMIN — Medication 1 MILLIGRAM(S): at 03:44

## 2022-01-01 RX ADMIN — CEFTRIAXONE 100 MILLIGRAM(S): 500 INJECTION, POWDER, FOR SOLUTION INTRAMUSCULAR; INTRAVENOUS at 17:18

## 2022-01-01 RX ADMIN — Medication 81 MILLIGRAM(S): at 12:19

## 2022-01-01 RX ADMIN — Medication 162 MILLIGRAM(S): at 01:59

## 2022-01-01 RX ADMIN — FAMOTIDINE 20 MILLIGRAM(S): 10 INJECTION INTRAVENOUS at 11:25

## 2022-01-01 RX ADMIN — FAMOTIDINE 20 MILLIGRAM(S): 10 INJECTION INTRAVENOUS at 11:46

## 2022-01-01 RX ADMIN — CHLORHEXIDINE GLUCONATE 1 APPLICATION(S): 213 SOLUTION TOPICAL at 11:47

## 2022-01-01 RX ADMIN — Medication 100 MILLIGRAM(S): at 21:59

## 2022-01-01 RX ADMIN — Medication 60 MILLIGRAM(S): at 05:47

## 2022-01-01 RX ADMIN — SIMVASTATIN 10 MILLIGRAM(S): 20 TABLET, FILM COATED ORAL at 22:29

## 2022-01-01 RX ADMIN — Medication 60 MILLIGRAM(S): at 04:35

## 2022-01-01 RX ADMIN — APIXABAN 2.5 MILLIGRAM(S): 2.5 TABLET, FILM COATED ORAL at 06:00

## 2022-01-01 RX ADMIN — APIXABAN 2.5 MILLIGRAM(S): 2.5 TABLET, FILM COATED ORAL at 17:34

## 2022-01-01 RX ADMIN — SIMVASTATIN 10 MILLIGRAM(S): 20 TABLET, FILM COATED ORAL at 21:59

## 2022-01-01 RX ADMIN — MORPHINE SULFATE 2 MILLIGRAM(S): 50 CAPSULE, EXTENDED RELEASE ORAL at 21:28

## 2022-01-01 RX ADMIN — MORPHINE SULFATE 2 MILLIGRAM(S): 50 CAPSULE, EXTENDED RELEASE ORAL at 16:16

## 2022-01-01 RX ADMIN — TAMSULOSIN HYDROCHLORIDE 0.8 MILLIGRAM(S): 0.4 CAPSULE ORAL at 21:34

## 2022-01-01 RX ADMIN — SIMVASTATIN 10 MILLIGRAM(S): 20 TABLET, FILM COATED ORAL at 22:58

## 2022-01-01 RX ADMIN — Medication 1000 UNIT(S): at 11:46

## 2022-01-01 RX ADMIN — HEPARIN SODIUM 1300 UNIT(S)/HR: 5000 INJECTION INTRAVENOUS; SUBCUTANEOUS at 14:15

## 2022-01-01 RX ADMIN — LIDOCAINE 1 PATCH: 4 CREAM TOPICAL at 19:30

## 2022-01-01 RX ADMIN — Medication 1000 UNIT(S): at 11:37

## 2022-01-01 RX ADMIN — ETOMIDATE 20 MILLIGRAM(S): 2 INJECTION INTRAVENOUS at 17:10

## 2022-01-01 RX ADMIN — FENTANYL CITRATE 14.4 MICROGRAM(S)/KG/HR: 50 INJECTION INTRAVENOUS at 18:37

## 2022-01-01 RX ADMIN — Medication 12.5 MILLIGRAM(S): at 05:20

## 2022-01-01 RX ADMIN — FAMOTIDINE 20 MILLIGRAM(S): 10 INJECTION INTRAVENOUS at 11:37

## 2022-01-01 RX ADMIN — FAMOTIDINE 20 MILLIGRAM(S): 10 INJECTION INTRAVENOUS at 12:19

## 2022-01-01 RX ADMIN — MORPHINE SULFATE 2 MILLIGRAM(S): 50 CAPSULE, EXTENDED RELEASE ORAL at 05:17

## 2022-01-01 RX ADMIN — Medication 650 MILLIGRAM(S): at 09:58

## 2022-01-01 RX ADMIN — PROPOFOL 2.15 MICROGRAM(S)/KG/MIN: 10 INJECTION, EMULSION INTRAVENOUS at 00:51

## 2022-01-01 RX ADMIN — CEFTRIAXONE 100 MILLIGRAM(S): 500 INJECTION, POWDER, FOR SOLUTION INTRAMUSCULAR; INTRAVENOUS at 17:12

## 2022-01-01 RX ADMIN — LIDOCAINE 1 PATCH: 4 CREAM TOPICAL at 23:28

## 2022-01-01 RX ADMIN — Medication 12.5 MILLIGRAM(S): at 14:31

## 2022-01-01 RX ADMIN — ALBUTEROL 2 PUFF(S): 90 AEROSOL, METERED ORAL at 06:58

## 2022-01-01 RX ADMIN — BUMETANIDE 2 MILLIGRAM(S): 0.25 INJECTION INTRAMUSCULAR; INTRAVENOUS at 09:30

## 2022-01-01 RX ADMIN — Medication 81 MILLIGRAM(S): at 12:04

## 2022-01-01 RX ADMIN — Medication 1000 UNIT(S): at 12:04

## 2022-01-01 RX ADMIN — FAMOTIDINE 20 MILLIGRAM(S): 10 INJECTION INTRAVENOUS at 11:09

## 2022-01-01 RX ADMIN — CHLORHEXIDINE GLUCONATE 1 APPLICATION(S): 213 SOLUTION TOPICAL at 14:48

## 2022-01-01 RX ADMIN — AZITHROMYCIN 255 MILLIGRAM(S): 500 TABLET, FILM COATED ORAL at 16:06

## 2022-01-01 RX ADMIN — Medication 1 PUFF(S): at 02:38

## 2022-01-01 RX ADMIN — TAMSULOSIN HYDROCHLORIDE 0.8 MILLIGRAM(S): 0.4 CAPSULE ORAL at 22:58

## 2022-01-01 RX ADMIN — SIMVASTATIN 10 MILLIGRAM(S): 20 TABLET, FILM COATED ORAL at 21:34

## 2022-01-01 RX ADMIN — APIXABAN 2.5 MILLIGRAM(S): 2.5 TABLET, FILM COATED ORAL at 12:19

## 2022-01-01 RX ADMIN — APIXABAN 2.5 MILLIGRAM(S): 2.5 TABLET, FILM COATED ORAL at 06:13

## 2022-01-01 RX ADMIN — Medication 60 MILLIGRAM(S): at 17:29

## 2022-01-01 RX ADMIN — APIXABAN 2.5 MILLIGRAM(S): 2.5 TABLET, FILM COATED ORAL at 17:26

## 2022-01-01 RX ADMIN — Medication 81 MILLIGRAM(S): at 11:45

## 2022-01-01 RX ADMIN — Medication 1000 UNIT(S): at 12:19

## 2022-01-01 RX ADMIN — CHLORHEXIDINE GLUCONATE 15 MILLILITER(S): 213 SOLUTION TOPICAL at 05:51

## 2022-01-01 RX ADMIN — MORPHINE SULFATE 2 MILLIGRAM(S): 50 CAPSULE, EXTENDED RELEASE ORAL at 08:40

## 2022-01-01 RX ADMIN — LIDOCAINE 1 APPLICATION(S): 4 CREAM TOPICAL at 17:08

## 2022-01-01 RX ADMIN — CHLORHEXIDINE GLUCONATE 15 MILLILITER(S): 213 SOLUTION TOPICAL at 17:12

## 2022-01-01 RX ADMIN — AZITHROMYCIN 255 MILLIGRAM(S): 500 TABLET, FILM COATED ORAL at 16:05

## 2022-01-01 RX ADMIN — SIMVASTATIN 10 MILLIGRAM(S): 20 TABLET, FILM COATED ORAL at 22:00

## 2022-01-01 RX ADMIN — Medication 1 PUFF(S): at 21:55

## 2022-01-01 RX ADMIN — FENTANYL CITRATE 14.4 MICROGRAM(S)/KG/HR: 50 INJECTION INTRAVENOUS at 18:49

## 2022-01-01 RX ADMIN — PROPOFOL 2.15 MICROGRAM(S)/KG/MIN: 10 INJECTION, EMULSION INTRAVENOUS at 17:09

## 2022-01-01 RX ADMIN — Medication 2 MILLIGRAM(S): at 16:13

## 2022-01-01 RX ADMIN — ROBINUL 0.2 MILLIGRAM(S): 0.2 INJECTION INTRAMUSCULAR; INTRAVENOUS at 17:27

## 2022-01-01 RX ADMIN — MORPHINE SULFATE 2 MILLIGRAM(S): 50 CAPSULE, EXTENDED RELEASE ORAL at 16:13

## 2022-01-01 RX ADMIN — Medication 650 MILLIGRAM(S): at 19:47

## 2022-01-01 RX ADMIN — CHLORHEXIDINE GLUCONATE 15 MILLILITER(S): 213 SOLUTION TOPICAL at 05:59

## 2022-01-01 RX ADMIN — CEFTRIAXONE 100 MILLIGRAM(S): 500 INJECTION, POWDER, FOR SOLUTION INTRAMUSCULAR; INTRAVENOUS at 16:05

## 2022-01-01 RX ADMIN — Medication 650 MILLIGRAM(S): at 20:46

## 2022-01-01 RX ADMIN — TAMSULOSIN HYDROCHLORIDE 0.8 MILLIGRAM(S): 0.4 CAPSULE ORAL at 22:00

## 2022-01-01 RX ADMIN — TAMSULOSIN HYDROCHLORIDE 0.8 MILLIGRAM(S): 0.4 CAPSULE ORAL at 22:30

## 2022-01-01 RX ADMIN — Medication 5 MG/HR: at 06:28

## 2022-01-01 RX ADMIN — Medication 1 PUFF(S): at 21:11

## 2022-01-01 RX ADMIN — Medication 1 PUFF(S): at 20:05

## 2022-01-01 RX ADMIN — APIXABAN 2.5 MILLIGRAM(S): 2.5 TABLET, FILM COATED ORAL at 17:30

## 2022-01-01 RX ADMIN — ISOSORBIDE MONONITRATE 30 MILLIGRAM(S): 60 TABLET, EXTENDED RELEASE ORAL at 12:19

## 2022-01-01 RX ADMIN — CHLORHEXIDINE GLUCONATE 1 APPLICATION(S): 213 SOLUTION TOPICAL at 14:30

## 2022-01-01 RX ADMIN — Medication 81 MILLIGRAM(S): at 11:36

## 2022-01-01 RX ADMIN — Medication 60 MILLIGRAM(S): at 05:20

## 2022-01-01 RX ADMIN — ROBINUL 0.4 MILLIGRAM(S): 0.2 INJECTION INTRAMUSCULAR; INTRAVENOUS at 23:03

## 2022-01-01 RX ADMIN — MORPHINE SULFATE 2 MILLIGRAM(S): 50 CAPSULE, EXTENDED RELEASE ORAL at 03:01

## 2022-01-01 RX ADMIN — Medication 25 MILLIGRAM(S): at 13:08

## 2022-01-01 RX ADMIN — Medication 1 MILLIGRAM(S): at 22:24

## 2022-01-01 RX ADMIN — Medication 100 MILLIGRAM(S): at 11:24

## 2022-01-01 RX ADMIN — Medication 40 MILLIGRAM(S): at 06:49

## 2022-01-01 RX ADMIN — MORPHINE SULFATE 2 MILLIGRAM(S): 50 CAPSULE, EXTENDED RELEASE ORAL at 19:49

## 2022-01-01 RX ADMIN — FAMOTIDINE 20 MILLIGRAM(S): 10 INJECTION INTRAVENOUS at 12:04

## 2022-01-01 RX ADMIN — Medication 40 MILLIGRAM(S): at 01:59

## 2022-01-01 RX ADMIN — Medication 25 MILLIGRAM(S): at 14:20

## 2022-01-01 RX ADMIN — Medication 60 MILLIGRAM(S): at 00:06

## 2022-01-01 RX ADMIN — Medication 60 MILLIGRAM(S): at 05:59

## 2022-01-01 RX ADMIN — HEPARIN SODIUM 5000 UNIT(S): 5000 INJECTION INTRAVENOUS; SUBCUTANEOUS at 06:15

## 2022-01-01 RX ADMIN — Medication 50 MILLILITER(S): at 09:35

## 2022-01-01 RX ADMIN — PROPOFOL 2.15 MICROGRAM(S)/KG/MIN: 10 INJECTION, EMULSION INTRAVENOUS at 00:34

## 2022-01-01 RX ADMIN — ROBINUL 0.4 MILLIGRAM(S): 0.2 INJECTION INTRAMUSCULAR; INTRAVENOUS at 05:22

## 2022-01-01 RX ADMIN — LIDOCAINE 1 PATCH: 4 CREAM TOPICAL at 12:20

## 2022-01-01 RX ADMIN — FENTANYL CITRATE 14.4 MICROGRAM(S)/KG/HR: 50 INJECTION INTRAVENOUS at 00:34

## 2022-01-01 RX ADMIN — Medication 81 MILLIGRAM(S): at 11:24

## 2022-01-01 RX ADMIN — ISOSORBIDE MONONITRATE 30 MILLIGRAM(S): 60 TABLET, EXTENDED RELEASE ORAL at 12:05

## 2022-01-01 RX ADMIN — Medication 25 MILLIGRAM(S): at 06:13

## 2022-01-01 RX ADMIN — CEFTRIAXONE 100 MILLIGRAM(S): 500 INJECTION, POWDER, FOR SOLUTION INTRAMUSCULAR; INTRAVENOUS at 17:27

## 2022-01-01 RX ADMIN — Medication 60 MILLIGRAM(S): at 17:14

## 2022-01-01 RX ADMIN — HYDROMORPHONE HYDROCHLORIDE 0.5 MILLIGRAM(S): 2 INJECTION INTRAMUSCULAR; INTRAVENOUS; SUBCUTANEOUS at 01:30

## 2022-01-01 RX ADMIN — LIDOCAINE 1 PATCH: 4 CREAM TOPICAL at 11:37

## 2022-01-01 RX ADMIN — APIXABAN 2.5 MILLIGRAM(S): 2.5 TABLET, FILM COATED ORAL at 05:20

## 2022-01-01 RX ADMIN — AZITHROMYCIN 255 MILLIGRAM(S): 500 TABLET, FILM COATED ORAL at 17:12

## 2022-01-01 RX ADMIN — Medication 40 MILLIGRAM(S): at 06:57

## 2022-01-01 RX ADMIN — Medication 2.5 MG/HR: at 14:04

## 2022-01-01 RX ADMIN — SIMVASTATIN 10 MILLIGRAM(S): 20 TABLET, FILM COATED ORAL at 21:49

## 2022-01-01 RX ADMIN — Medication 40 MILLIGRAM(S): at 17:34

## 2022-01-01 RX ADMIN — Medication 1 PUFF(S): at 09:12

## 2022-01-01 RX ADMIN — CEFTRIAXONE 100 MILLIGRAM(S): 500 INJECTION, POWDER, FOR SOLUTION INTRAMUSCULAR; INTRAVENOUS at 16:06

## 2022-01-01 RX ADMIN — HYDROMORPHONE HYDROCHLORIDE 0.5 MILLIGRAM(S): 2 INJECTION INTRAMUSCULAR; INTRAVENOUS; SUBCUTANEOUS at 00:32

## 2022-01-01 RX ADMIN — APIXABAN 2.5 MILLIGRAM(S): 2.5 TABLET, FILM COATED ORAL at 17:23

## 2022-01-01 RX ADMIN — HALOPERIDOL DECANOATE 2.5 MILLIGRAM(S): 100 INJECTION INTRAMUSCULAR at 14:47

## 2022-01-01 RX ADMIN — Medication 6.73 MICROGRAM(S)/KG/MIN: at 17:09

## 2022-01-01 RX ADMIN — Medication 20 MILLIGRAM(S): at 09:11

## 2022-01-01 RX ADMIN — Medication 1 PUFF(S): at 14:10

## 2022-01-01 RX ADMIN — Medication 650 MILLIGRAM(S): at 09:36

## 2022-01-01 RX ADMIN — DEXMEDETOMIDINE HYDROCHLORIDE IN 0.9% SODIUM CHLORIDE 8.98 MICROGRAM(S)/KG/HR: 4 INJECTION INTRAVENOUS at 17:32

## 2022-01-01 RX ADMIN — TAMSULOSIN HYDROCHLORIDE 0.8 MILLIGRAM(S): 0.4 CAPSULE ORAL at 21:59

## 2022-01-01 RX ADMIN — ROBINUL 0.2 MILLIGRAM(S): 0.2 INJECTION INTRAMUSCULAR; INTRAVENOUS at 12:18

## 2022-01-01 RX ADMIN — LIDOCAINE 1 PATCH: 4 CREAM TOPICAL at 00:51

## 2022-02-22 NOTE — ED ADULT NURSE NOTE - NS ED NURSE LEVEL OF CONSCIOUSNESS AFFECT
Spoke to patient. Advise patient blood pressure better and to come back in 1 month for recheck. Patient verbalized understanding. Patient will call when he is back in from [place] at the end of march to set up nurse visit.    Calm

## 2022-03-07 NOTE — ED ADULT TRIAGE NOTE - CHIEF COMPLAINT QUOTE
Patient was sent from Assisted Living Facility for evaluation of pleural effusion as confirmed by X-ray.  Patient states he has shortness of breath x 1 week
home/Patient had maximal functional independence , no skilled PT is indicated at this time

## 2022-03-07 NOTE — ED ADULT TRIAGE NOTE - ARRIVAL FROM
[facility]

## 2022-03-08 PROBLEM — Z87.09 PERSONAL HISTORY OF OTHER DISEASES OF THE RESPIRATORY SYSTEM: Chronic | Status: ACTIVE | Noted: 2020-01-24

## 2022-03-08 NOTE — H&P ADULT - PROBLEM SELECTOR PLAN 2
- Baseline normal   - Today >2  - Likely due to fluid overload   - Will repeat BMP   - Sent U lytes   - Continue on Lasix

## 2022-03-08 NOTE — ED ADULT NURSE NOTE - CHIEF COMPLAINT QUOTE
Patient was sent from Assisted Living Facility for evaluation of pleural effusion as confirmed by X-ray.  Patient states he has shortness of breath x 1 week

## 2022-03-08 NOTE — H&P ADULT - ASSESSMENT
Patient is 89 years old male with past medical history of past medical history of HTN, BPH,latent TB and COPD, CHF who was brought to ED due to shortness of breath that started 2 weeks ago. Patient reports that for the last two weeks, His shortness of breath was getting worse. Patient reports that he wasn't aware that he has heart problem. On physical exam, Patient has diffuse crackles throughout lungs and shortness of breathing when lying down. Patient is saturating 96% on 3L NC in ED.    BNP>11K, Troponin is 560, EKG showed Right bundle branch block. Patient will get Lasix 60mg IV and will reassess.

## 2022-03-08 NOTE — H&P ADULT - PROBLEM SELECTOR PLAN 1
- Patient with hx of CHF came today with Shortness of breath; CHF exacerbation.  - CXR showed pulmonary edema and cardiomegaly ( Pending report )  - BNP >11K   - Troponin >500; EKG showed junctional rhythm and right bundle branch block  - Echo in 2020 showed moderate to severe Aortic regurgitation with grade II diastolic dysfunction   - Will give 60mg Lasix IV   - Will continue on ASA, Statin and Nitro   - Continue on Lasix 40mg IV daily from tomorrow   - Diet with fluid restriction   - Intake and outtake   - Tele admission   - Echo   - Cardiology consult Dr. Richardson

## 2022-03-08 NOTE — ED ADULT NURSE NOTE - NSIMPLEMENTINTERV_GEN_ALL_ED
Implemented All Fall with Harm Risk Interventions:  [place] to call system. Call bell, personal items and telephone within reach. Instruct patient to call for assistance. Room bathroom lighting operational. Non-slip footwear when patient is off stretcher. Physically safe environment: no spills, clutter or unnecessary equipment. Stretcher in lowest position, wheels locked, appropriate side rails in place. Provide visual cue, wrist band, yellow gown, etc. Monitor gait and stability. Monitor for mental status changes and reorient to person, place, and time. Review medications for side effects contributing to fall risk. Reinforce activity limits and safety measures with patient and family. Provide visual clues: red socks.

## 2022-03-08 NOTE — CONSULT NOTE ADULT - ASSESSMENT
89 yo M with h/o prior smoking, COPD, latent TB, Afib (on Eliquis), CAD (1 stent 20 yrs ago), CHF and HTN p/w progressive worsening shortness of breath c/w CHF exacerbation. Cardiology consulted and admitted to telemetry unit.    #Acute decompensated CHF  likely 2/2 Afib   T1: 559 > downtrended T2: 548  HR 97 Afib, low voltage RBBB QRS 120ms, QTc 467   BNP 11,605, dimer negative 268  CXR wet read with b/l intersitial edema  - strict I's/O's, fluid restrict, daily weights  - admit to tele  - resume home meds (Imdur, statin, ASA, eliquis)  - Metop tartrate 25 po q 8 w/ hold parameters  - Diurese w/ Lasix 40 IV BID  - Echocardiogram    #Afib  - rate control with metop tart 25 po q 8  - AC Eliquis 2.5 BID    #LUANNE on CKD  Prior Creat 1.0 in 2020, now presenting with BUN/ creat 34/ 2.20  had recent renal US w/out hydronephrosis   obtain records and updated baseline Cr  - avoid nephrotoxins  - renally dose medications

## 2022-03-08 NOTE — CONSULT NOTE ADULT - SUBJECTIVE AND OBJECTIVE BOX
CHIEF COMPLAINT: shortness of breath    HPI:     PAST MEDICAL & SURGICAL HISTORY:  History of hypertension    Hypercholesterolemia    BPH (benign prostatic hyperplasia)    History of COPD        Allergies    No Known Allergies    Intolerances        MEDICATIONS  (STANDING):  aspirin  chewable 81 milliGRAM(s) Oral daily  cholecalciferol 1000 Unit(s) Oral daily  famotidine    Tablet 20 milliGRAM(s) Oral daily  heparin   Injectable 5000 Unit(s) SubCutaneous every 8 hours  insulin lispro (ADMELOG) corrective regimen sliding scale   SubCutaneous three times a day before meals  isosorbide   mononitrate ER Tablet (IMDUR) 30 milliGRAM(s) Oral daily  metoprolol tartrate 50 milliGRAM(s) Oral two times a day  simvastatin 10 milliGRAM(s) Oral at bedtime  tamsulosin 0.8 milliGRAM(s) Oral at bedtime    MEDICATIONS  (PRN):      FAMILY HISTORY:    No family history of premature coronary artery disease or sudden cardiac death    SOCIAL HISTORY:  Smoking-  Alcohol-  Illicit Drug use-    REVIEW OF SYSTEMS:  Constitutional: [ ] fever, [ ]weight loss,  [ ]fatigue  Eyes: [ ] visual changes  Respiratory: [ ]shortness of breath;  [ ] cough, [ ]wheezing, [ ]chills, [ ]hemoptysis  Cardiovascular: [ ] chest pain, [ ]palpitations, [ ]dizziness,  [ ]leg swelling [ ]syncope  Gastrointestinal: [ ] abdominal pain, [ ]nausea, [ ]vomiting,  [ ]diarrhea   Genitourinary: [ ] dysuria, [ ] hematuria  Neurologic: [ ] headaches [ ] tremors  [ ] weakness [ ] lightheadedness  Skin: [ ] itching, [ ]burning, [ ] rashes  Endocrine: [ ] heat or cold intolerance  Musculoskeletal: [ ] joint pain or swelling; [ ] muscle, back, or extremity pain  Psychiatric: [ ] depression, [ ]anxiety, [ ]mood swings, or [ ]difficulty sleeping  Hematologic: [ ] easy bruising, [ ] bleeding gums       [ x] All others negative	  [ ] Unable to obtain    Vital Signs Last 24 Hrs  T(C): 36.4 (08 Mar 2022 07:20), Max: 36.6 (08 Mar 2022 04:30)  T(F): 97.6 (08 Mar 2022 07:20), Max: 97.8 (08 Mar 2022 04:30)  HR: 98 (08 Mar 2022 07:20) (94 - 100)  BP: 108/71 (08 Mar 2022 07:20) (104/65 - 118/84)  BP(mean): --  RR: 21 (08 Mar 2022 07:20) (21 - 30)  SpO2: 98% (08 Mar 2022 07:20) (97% - 100%)  I&O's Summary      PHYSICAL EXAM:  General: No acute distress  HEENT: EOMI, PERRL  Neck: Supple, No JVD  Lungs: Clear to auscultation bilaterally; No rales or wheezing  Heart: Regular rate and rhythm; No murmurs, rubs, or gallops  Abdomen: Nontender, bowel sounds present  Extremities: No clubbing, cyanosis, or edema  Nervous system:  Alert & Oriented X3, no focal deficits  Psychiatric: Normal affect  Skin: No rashes or lesions      LABS:  03-08    138  |  100  |  35<H>  ----------------------------<  91  4.7   |  31  |  2.16<H>    Ca    10.1      08 Mar 2022 05:31  Phos  3.8     03-08  Mg     2.5     03-08    TPro  6.6  /  Alb  3.1<L>  /  TBili  3.1<H>  /  DBili  x   /  AST  264<H>  /  ALT  185<H>  /  AlkPhos  97  03-08    Creatinine Trend: 2.16<--, 2.20<--                        14.7   10.03 )-----------( 181      ( 08 Mar 2022 05:31 )             45.1     Lipid Panel: Cholesterol, Serum 103  Direct LDL --  HDL Cholesterol, Serum 53  Triglycerides, Serum 91    Cardiac Enzymes:     Serum Pro-Brain Natriuretic Peptide: 49393 pg/mL (03-08-22 @ 00:07)    RADIOLOGY:    ECG [my interpretation]:    TELEMETRY:    ECHO:    STRESS TEST:    CATHETERIZATION: CHIEF COMPLAINT: shortness of breath    HPI:  89 year old male with h/o prior smoking (2ppd x 40yrs, quit 40 years ago), COPD?, latent TB, Afib?, CHF and HTN who was sent in from NH for progressive worsening shortness of breath. Ms. Abraham states that his difficulty breathing began approximately 2-3 weeks ago so they did a CXR which showed fluid in his lungs. They increased his Lasix dose from 40mg to 60mg daily but his symptoms had not improved. His SOB is associated with a non-productive cough and wheezing. He does NOT use oxygen at baseline but here in ED is on 4L with saturation of 100%. Also reports leg swelling, orthopnea and exertional dyspnea when ambulating with walker. Last Echo in Jan 2020 showing mild dilated LV and mild dilated LA with poor visualization and GIIDD. Prior EKG in Jan 2020 showed sinus rhythm with PVC, PAC and RBBB. Was hospitalized at [facility] 01/2020 for PNA w/ interstitial pulmonary edema. Improved after Abx course.      PATIENT AND CHART REVIEW SHOWS PRIOR DNR/DNI - to be documented and ordered by primary team.    PAST MEDICAL & SURGICAL HISTORY:  History of hypertension    Hypercholesterolemia    BPH (benign prostatic hyperplasia)    History of COPD        Allergies    No Known Allergies    Intolerances        MEDICATIONS  (STANDING):  aspirin  chewable 81 milliGRAM(s) Oral daily  cholecalciferol 1000 Unit(s) Oral daily  famotidine    Tablet 20 milliGRAM(s) Oral daily  heparin   Injectable 5000 Unit(s) SubCutaneous every 8 hours  insulin lispro (ADMELOG) corrective regimen sliding scale   SubCutaneous three times a day before meals  isosorbide   mononitrate ER Tablet (IMDUR) 30 milliGRAM(s) Oral daily  metoprolol tartrate 50 milliGRAM(s) Oral two times a day  simvastatin 10 milliGRAM(s) Oral at bedtime  tamsulosin 0.8 milliGRAM(s) Oral at bedtime    MEDICATIONS  (PRN):      FAMILY HISTORY:    No family history of premature coronary artery disease or sudden cardiac death    SOCIAL HISTORY:  Smoking-  Alcohol-  Illicit Drug use-    REVIEW OF SYSTEMS:  Constitutional: [ ] fever, [ ]weight loss,  [ ]fatigue  Eyes: [ ] visual changes  Respiratory: [ ]shortness of breath;  [ ] cough, [ ]wheezing, [ ]chills, [ ]hemoptysis  Cardiovascular: [ ] chest pain, [ ]palpitations, [ ]dizziness,  [ ]leg swelling [ ]syncope  Gastrointestinal: [ ] abdominal pain, [ ]nausea, [ ]vomiting,  [ ]diarrhea   Genitourinary: [ ] dysuria, [ ] hematuria  Neurologic: [ ] headaches [ ] tremors  [ ] weakness [ ] lightheadedness  Skin: [ ] itching, [ ]burning, [ ] rashes  Endocrine: [ ] heat or cold intolerance  Musculoskeletal: [ ] joint pain or swelling; [ ] muscle, back, or extremity pain  Psychiatric: [ ] depression, [ ]anxiety, [ ]mood swings, or [ ]difficulty sleeping  Hematologic: [ ] easy bruising, [ ] bleeding gums       [ x] All others negative	  [ ] Unable to obtain    Vital Signs Last 24 Hrs  T(C): 36.4 (08 Mar 2022 07:20), Max: 36.6 (08 Mar 2022 04:30)  T(F): 97.6 (08 Mar 2022 07:20), Max: 97.8 (08 Mar 2022 04:30)  HR: 98 (08 Mar 2022 07:20) (94 - 100)  BP: 108/71 (08 Mar 2022 07:20) (104/65 - 118/84)  BP(mean): --  RR: 21 (08 Mar 2022 07:20) (21 - 30)  SpO2: 98% (08 Mar 2022 07:20) (97% - 100%)  I&O's Summary      PHYSICAL EXAM:  General: No acute distress  HEENT: EOMI, PERRL  Neck: Supple, No JVD  Lungs: Clear to auscultation bilaterally; No rales or wheezing  Heart: Regular rate and rhythm; No murmurs, rubs, or gallops  Abdomen: Nontender, bowel sounds present  Extremities: No clubbing, cyanosis, or edema  Nervous system:  Alert & Oriented X3, no focal deficits  Psychiatric: Normal affect  Skin: No rashes or lesions      LABS:  03-08    138  |  100  |  35<H>  ----------------------------<  91  4.7   |  31  |  2.16<H>    Ca    10.1      08 Mar 2022 05:31  Phos  3.8     03-08  Mg     2.5     03-08    TPro  6.6  /  Alb  3.1<L>  /  TBili  3.1<H>  /  DBili  x   /  AST  264<H>  /  ALT  185<H>  /  AlkPhos  97  03-08    Creatinine Trend: 2.16<--, 2.20<--                        14.7   10.03 )-----------( 181      ( 08 Mar 2022 05:31 )             45.1     Lipid Panel: Cholesterol, Serum 103  Direct LDL --  HDL Cholesterol, Serum 53  Triglycerides, Serum 91    Cardiac Enzymes:     Serum Pro-Brain Natriuretic Peptide: 42931 pg/mL (03-08-22 @ 00:07)    RADIOLOGY:    ECG [my interpretation]:    TELEMETRY:    ECHO:    STRESS TEST:    CATHETERIZATION: CHIEF COMPLAINT: shortness of breath    HPI:  89 year old male with h/o prior smoking (2ppd x 40yrs, quit 40 years ago), COPD, latent TB, Afib (on Eliquis), CAD (1 stent 20 yrs ago at [facility]), CHF and HTN who was sent in from NH for progressive worsening shortness of breath. Ms. Abraham states that his difficulty breathing which began approximately 2-3 weeks ago. His SOB is associated with a non-productive cough and wheezing. He does NOT use oxygen at baseline but here in ED is on 4L with saturation of 100%. Also reports leg swelling, orthopnea and exertional dyspnea when ambulating with walker, now unable to rise from chair without dyspnea. Daughter Zarina Alvarez at bedside states that he has also had a 20lb unintentional weight loss over last year with lack of appetite. Denies any chest pain or palpitations. No fevers or chills. Was seen by his cardiologist Dr. Aidan Dutton and he increased his Lasix dose from 40mg to 60mg daily and metoprolol tart from 25 qd to 50 qd. His symptoms had not improved and his CXR read showed pulmonary edema so he was sent in to ED. Last Echo in Jan 2020 showing mild dilated LV and mild dilated LA with poor visualization and GIIDD. Prior EKG in Jan 2020 showed sinus rhythm with PVC, PAC and RBBB. Was hospitalized at [facility] 01/2020 for PNA w/ interstitial pulmonary edema. Improved after Abx course. Patient follows closely with cardiology Dr. Aidan Dutton - will obtain records and any more recent echo studies.    PAST MEDICAL & SURGICAL HISTORY:  History of hypertension    Hypercholesterolemia    BPH (benign prostatic hyperplasia)    History of COPD      Allergies    No Known Allergies    Intolerances    MEDICATIONS  (STANDING):  aspirin  chewable 81 milliGRAM(s) Oral daily  cholecalciferol 1000 Unit(s) Oral daily  famotidine    Tablet 20 milliGRAM(s) Oral daily  heparin   Injectable 5000 Unit(s) SubCutaneous every 8 hours  insulin lispro (ADMELOG) corrective regimen sliding scale   SubCutaneous three times a day before meals  isosorbide   mononitrate ER Tablet (IMDUR) 30 milliGRAM(s) Oral daily  metoprolol tartrate 50 milliGRAM(s) Oral two times a day  simvastatin 10 milliGRAM(s) Oral at bedtime  tamsulosin 0.8 milliGRAM(s) Oral at bedtime    MEDICATIONS  (PRN):      FAMILY HISTORY:    No family history of premature coronary artery disease or sudden cardiac death    SOCIAL HISTORY:  Smoking-  Alcohol-  Illicit Drug use-    REVIEW OF SYSTEMS:  Constitutional: [ ] fever, [x ]weight loss,  [x ]fatigue [x] lack of appetite  Eyes: [ ] visual changes  Respiratory: [ x]shortness of breath;  [ x] cough, [x ]wheezing, [ ]chills, [ ]hemoptysis  Cardiovascular: [ ] chest pain, [ ]palpitations, [ ]dizziness,  [ x]leg swelling [ ]syncope  Gastrointestinal: [ ] abdominal pain, [ ]nausea, [ ]vomiting,  [ ]diarrhea   Genitourinary: [ ] dysuria, [ ] hematuria  Neurologic: [ ] headaches [ ] tremors  [ ] weakness [ ] lightheadedness  Skin: [ ] itching, [ ]burning, [ ] rashes  Endocrine: [ ] heat or cold intolerance  Musculoskeletal: [ ] joint pain or swelling; [ ] muscle, back, or extremity pain  Psychiatric: [ ] depression, [ ]anxiety, [ ]mood swings, or [ ]difficulty sleeping  Hematologic: [ ] easy bruising, [ ] bleeding gums       [ x] All others negative	  [ ] Unable to obtain    Vital Signs Last 24 Hrs  T(C): 36.4 (08 Mar 2022 07:20), Max: 36.6 (08 Mar 2022 04:30)  T(F): 97.6 (08 Mar 2022 07:20), Max: 97.8 (08 Mar 2022 04:30)  HR: 98 (08 Mar 2022 07:20) (94 - 100)  BP: 108/71 (08 Mar 2022 07:20) (104/65 - 118/84)  BP(mean): --  RR: 21 (08 Mar 2022 07:20) (21 - 30)  SpO2: 98% (08 Mar 2022 07:20) (97% - 100%)  I&O's Summary      PHYSICAL EXAM:  General: No acute distress, sitting up on 4L NC sat 100%, with occasional cough  HEENT: EOMI, PERRL, glasses  Neck: Supple, No JVD  Lungs: Coarse crackles at bases bilaterally, scant insp/exp wheeze  Heart: irregular rhythm, rate 84 BPM; No murmurs, rubs, or gallops  Abdomen: Nontender, bowel sounds present  Extremities: No clubbing, cyanosis, 2+ b/l pitting edema  Nervous system:  Alert & Oriented X3, no focal deficits  Psychiatric: Normal affect  Skin: No rashes or lesions      LABS:  03-08    138  |  100  |  35<H>  ----------------------------<  91  4.7   |  31  |  2.16<H>    Ca    10.1      08 Mar 2022 05:31  Phos  3.8     03-08  Mg     2.5     03-08    TPro  6.6  /  Alb  3.1<L>  /  TBili  3.1<H>  /  DBili  x   /  AST  264<H>  /  ALT  185<H>  /  AlkPhos  97  03-08    Creatinine Trend: 2.16<--, 2.20<--                        14.7   10.03 )-----------( 181      ( 08 Mar 2022 05:31 )             45.1     Lipid Panel: Cholesterol, Serum 103  Direct LDL --  HDL Cholesterol, Serum 53  Triglycerides, Serum 91    Cardiac Enzymes:     Serum Pro-Brain Natriuretic Peptide: 95604 pg/mL (03-08-22 @ 00:07)    RADIOLOGY: CXR wet read with b/l intersitial edema    ECG [my interpretation]: HR 97 Afib, low voltage RBBB QRS 120ms, QTc 467     TELEMETRY:     ECHO:     STRESS TEST:    CATHETERIZATION:

## 2022-03-08 NOTE — CHART NOTE - NSCHARTNOTEFT_GEN_A_CORE
EVENT: EDHL NP      OBJECTIVE:  Vital Signs Last 24 Hrs  T(C): 36.6 (08 Mar 2022 16:00), Max: 36.6 (08 Mar 2022 04:30)  T(F): 97.8 (08 Mar 2022 16:00), Max: 97.9 (08 Mar 2022 11:30)  HR: 81 (08 Mar 2022 16:00) (81 - 100)  BP: 107/67 (08 Mar 2022 16:00) (103/64 - 118/84)  BP(mean): 80 (08 Mar 2022 16:00) (80 - 80)  RR: 17 (08 Mar 2022 16:00) (17 - 30)  SpO2: 98% (08 Mar 2022 16:00) (97% - 100%)    LABS:                        14.7   10.03 )-----------( 181      ( 08 Mar 2022 05:31 )             45.1     03-08    138  |  100  |  35<H>  ----------------------------<  91  4.7   |  31  |  2.16<H>    Ca    10.1      08 Mar 2022 05:31  Phos  3.8     03-08  Mg     2.5     03-08    TPro  6.6  /  Alb  3.1<L>  /  TBili  3.1<H>  /  DBili  x   /  AST  264<H>  /  ALT  185<H>  /  AlkPhos  97  03-08      EKG:   IMGAGING:    ASSESSMENT:  Patient seen and examined at bedside. Patient alert and oriented to person and place, ambulated OOB to wheelchair. Patient daughter concerned for new confusion, agitation and frustration. Patient complain of lower back pain, improves when sitting OOB in chair.      PLAN:   f/u CT head  started on acetaminophen  started on Lidocaine patch  continue telemetry  f/u ECHO EVENT: EDHL NP      OBJECTIVE:  Vital Signs Last 24 Hrs  T(C): 36.6 (08 Mar 2022 16:00), Max: 36.6 (08 Mar 2022 04:30)  T(F): 97.8 (08 Mar 2022 16:00), Max: 97.9 (08 Mar 2022 11:30)  HR: 81 (08 Mar 2022 16:00) (81 - 100)  BP: 107/67 (08 Mar 2022 16:00) (103/64 - 118/84)  BP(mean): 80 (08 Mar 2022 16:00) (80 - 80)  RR: 17 (08 Mar 2022 16:00) (17 - 30)  SpO2: 98% (08 Mar 2022 16:00) (97% - 100%)    LABS:                        14.7   10.03 )-----------( 181      ( 08 Mar 2022 05:31 )             45.1     03-08    138  |  100  |  35<H>  ----------------------------<  91  4.7   |  31  |  2.16<H>    Ca    10.1      08 Mar 2022 05:31  Phos  3.8     03-08  Mg     2.5     03-08    TPro  6.6  /  Alb  3.1<L>  /  TBili  3.1<H>  /  DBili  x   /  AST  264<H>  /  ALT  185<H>  /  AlkPhos  97  03-08      EKG:   IMGAGING:    ASSESSMENT:  Patient seen and examined at bedside. Patient alert and oriented to person and place, ambulated OOB to wheelchair. Patient daughter concerned for new confusion, agitation and frustration. Patient complain of lower back pain, improves when sitting OOB in chair.      PLAN:   f/u CT head- if CT head abnormal, consider Neurology consult  started on acetaminophen  started on Lidocaine patch  continue telemetry  f/u ECHO

## 2022-03-08 NOTE — GOALS OF CARE CONVERSATION - ADVANCED CARE PLANNING - CONVERSATION DETAILS
Spoke in full detail with patient and daughter at bedside, patient a+ox3, regarding life sustaining treatment in the event of cardiac and respiratory arrest. Patient does not want cardiac resuscitation with chest compressions, however would like to have a trial of intubation in the event of respiratory distress requiring ventilation. Patient DNR with trial of intubation MOLST in chart. Patient Health Care Proxy form in the chart. Daughter brought forms from home. Patient full aware and made own decision. Patient and daughter encouraged to verbalize concerns, emotional support given.

## 2022-03-08 NOTE — H&P ADULT - NSHPREVIEWOFSYSTEMS_GEN_ALL_CORE
REVIEW OF SYSTEMS:  CONSTITUTIONAL: Weakness  EYES/ENT: No visual changes;  No vertigo or throat pain   RESPIRATORY: Shortness of breath   CARDIOVASCULAR: No chest pain or palpitations  GASTROINTESTINAL: No abdominal  pain. No nausea, vomiting. No diarrhea or constipation. No melena or hematochezia.  GENITOURINARY: No dysuria, frequency or hematuria  NEUROLOGICAL: No numbness or weakness  SKIN: No itching, rashes

## 2022-03-08 NOTE — H&P ADULT - HISTORY OF PRESENT ILLNESS
Patient is 89 years old male with past medical history of past medical history of HTN, BPH,latent TB and COPD, CHF who was brought to ED due to shortness of breath that started 2 weeks ago. Patient reports that for the last two weeks, His shortness of breath was getting worse. Patient reports that he wasn't aware that he has heart problem. On physical exam, Patient has diffuse crackles throughout lungs and shortness of breathing when lying down. Patient is saturating 96% on 3L NC in ED. Patient denied chest pain, abdominal pain, N/V or shortness of breath.

## 2022-03-08 NOTE — PATIENT PROFILE ADULT - FALL HARM RISK - HARM RISK INTERVENTIONS
Assistance with ambulation/Assistance OOB with selected safe patient handling equipment/Communicate Risk of Fall with Harm to all staff/Discuss with provider need for PT consult/Monitor gait and stability/Reinforce activity limits and safety measures with patient and family/Tailored Fall Risk Interventions/Visual Cue: Yellow wristband and red socks/Bed in lowest position, wheels locked, appropriate side rails in place/Call bell, personal items and telephone in reach/Instruct patient to call for assistance before getting out of bed or chair/Non-slip footwear when patient is out of bed/[place] to call system/Physically safe environment - no spills, clutter or unnecessary equipment/Purposeful Proactive Rounding/Room/bathroom lighting operational, light cord in reach

## 2022-03-08 NOTE — H&P ADULT - NSHPPHYSICALEXAM_GEN_ALL_CORE
ICU Vital Signs Last 24 Hrs  T(C): 36.4 (08 Mar 2022 00:01), Max: 36.4 (08 Mar 2022 00:01)  T(F): 97.6 (08 Mar 2022 00:01), Max: 97.6 (08 Mar 2022 00:01)  HR: 98 (08 Mar 2022 02:35) (97 - 100)  BP: 118/84 (08 Mar 2022 02:35) (109/66 - 118/84)  RR: 24 (08 Mar 2022 02:35) (24 - 30)  SpO2: 100% (08 Mar 2022 02:35) (100% - 100%)    GENERAL: NAD, lying in bed, in shortness of breath, AAOx2-3, on 3L NC   HEAD:  Atraumatic, Normocephalic  EYES: EOMI, PERRLA, conjunctiva and sclera clear  ENT: Moist mucous membranes  NECK: Supple, No JVD  CHEST/LUNG: Diffuse crackles throughout lungs with diminished breath sound in left lower lobe   HEART: Regular rate and rhythm; No murmurs, rubs, or gallops  ABDOMEN: Bowel sounds present; Soft, Nontender, Nondistended. No hepatomegally  EXTREMITIES:  2+ Peripheral Pulses, brisk capillary refill. No clubbing, cyanosis, or edema  NERVOUS SYSTEM:  Alert & Oriented X2-3, speech clear. No deficits   MSK: Bilateral legs pitting edema +2  SKIN: No rashes or lesions

## 2022-03-08 NOTE — ED PROVIDER NOTE - CROS ED RESP ALL NEG
Patient was last seen 2/11/20 and has an appointment 6/10/20.   Script was eprescribed to pharmacy per Dr. Ram for 6 months.      
- - -

## 2022-03-08 NOTE — ED PROVIDER NOTE - CLINICAL SUMMARY MEDICAL DECISION MAKING FREE TEXT BOX
Pt admitted for CHF exacerbation, lasix ordered, pt is chest pain free, trop elevated, ASA ordered.  Endorsed to Dr. Castelan who requested pt to be admitted to Dr. ATUL Javier.  MAR endorsed. Pt agrees with admission.  I had a detailed discussion with the patient and/or guardian regarding the historical points, exam findings, and any diagnostic results supporting the admit diagnosis.

## 2022-03-08 NOTE — ED PROVIDER NOTE - OBJECTIVE STATEMENT
Chief complaint of progressive shortness of breath x 2 weeks worsening today. +Orthopnea, +exertional dyspnea with walker.  No fever. No vomiting.  No chest pain.

## 2022-03-09 NOTE — PROGRESS NOTE ADULT - PROBLEM SELECTOR PLAN 1
- Patient with hx of CHF presented  with Shortness of breath   - CXR showed pulmonary edema and cardiomegaly   - BNP >11K   - Troponin >559 > 548  EKG showed junctional rhythm and right bundle branch block  - Echo in 2020 showed moderate to severe Aortic regurgitation with grade II diastolic dysfunction   - s/p 60mg Lasix IV   - Diet with fluid restriction   - Intake and outtake   - f/u Echo   -- Continue on Lasix 40mg IV BID  - Cardiology Dr. Richardson following

## 2022-03-09 NOTE — PROGRESS NOTE ADULT - ASSESSMENT
Patient is 89 years old male with past medical history of past medical history of HTN, BPH,latent TB and COPD, CHF who was brought to ED due to shortness of breath that started 2 weeks ago. Admitted for CHF exaccerbation.

## 2022-03-09 NOTE — PROGRESS NOTE ADULT - SUBJECTIVE AND OBJECTIVE BOX
PATIENT SEEN AND EXAMINED ON :-03/09/2022  DATE OF SERVICE:       03/09/2022      Interim events noted,Labs ,Radiological studies and Cardiology tests reviewed .        HPI:  89 year old male with h/o prior smoking (2ppd x 40yrs, quit 40 years ago), COPD, latent TB, Afib (on Eliquis), CAD (1 stent 20 yrs ago at [facility]), CHF and HTN who was sent in from NH for progressive worsening shortness of breath. Ms. Abraham states that his difficulty breathing which began approximately 2-3 weeks ago. His SOB is associated with a non-productive cough and wheezing.        INTERVAL HPI/OVERNIGHT EVENTS: overnight pt had 6 beats of v tach, then at 6am saturated on desaturate on 3L nc to 70%. pt was placed on 15 L nrb and was given 60 total of lasix and solumedrol.       REVIEW OF SYSTEMS:  CONSTITUTIONAL: No fever, weight loss, or fatigue  RESPIRATORY: No cough, wheezing, chills or hemoptysis; + shortness of breath  CARDIOVASCULAR: No chest pain, palpitations, dizziness, or leg swelling  GASTROINTESTINAL: No abdominal pain. No nausea, vomiting, or hematemesis; No diarrhea or constipation. No melena or hematochezia.  GENITOURINARY: No dysuria or hematuria, urinary frequency  NEUROLOGICAL: No headaches, memory loss, loss of strength, numbness, or tremors  SKIN: No itching, burning, rashes, or lesions     Vital Signs Last 24 Hrs  T(C): 36.2 (10 Mar 2022 11:30), Max: 36.6 (09 Mar 2022 15:56)  T(F): 97.2 (10 Mar 2022 11:30), Max: 97.8 (09 Mar 2022 15:56)  HR: 127 (10 Mar 2022 11:30) (65 - 135)  BP: 102/77 (10 Mar 2022 11:30) (102/61 - 125/83)  BP(mean): --  RR: 20 (10 Mar 2022 11:30) (18 - 30)  SpO2: 100% (10 Mar 2022 11:30) (94% - 100%)    PHYSICAL EXAMINATION:  GENERAL: mild distress on 15L NRB  HEAD:  Atraumatic, Normocephalic  EYES:  conjunctiva and sclera clear  NECK: Supple, No JVD, Normal thyroid  CHEST/LUNG: coarse breath sounds bilaterally   HEART: Regular rate and rhythm; No murmurs, rubs, or gallops  ABDOMEN: Soft, Nontender, Nondistended; Bowel sounds present  NERVOUS SYSTEM:  alert and awake, following commands   EXTREMITIES:  2+ Peripheral Pulses, No clubbing, cyanosis, +2 edema   SKIN: warm dry                          13.3   8.10  )-----------( 159      ( 10 Mar 2022 06:40 )             40.1     03-10    138  |  99  |  48<H>  ----------------------------<  115<H>  3.9   |  32<H>  |  2.24<H>    Ca    9.5      10 Mar 2022 06:40  Phos  4.1     03-10  Mg     2.5     03-10                            Assessment and Plan:   · Assessment	  Patient is 89 years old male with past medical history of past medical history of HTN, BPH,latent TB and COPD, CHF who was brought to ED due to shortness of breath that started 2 weeks ago. Admitted for CHF exaccerbation.     Problem/Plan - 1:  ·  Problem: Acute exacerbation of CHF (congestive heart failure).   ·  Plan: - Patient with hx of CHF presented  with Shortness of breath   - Echo showed grossly severe global left ventricular systolic dysfunction, and b/l pleural effusions  - overnight pt desaturated and was placed on 15L NRB  - CXR showed worsening congestion   - s/p lasix 60mg IV   -Start lasix drip    Problem/Plan - 2:  ·  Problem: Atrial fibrillation.   ·  Plan: pt is on Eliquis and metoprolol at home  c/w home meds.    Problem/Plan - 3:  ·  Problem: LUANNE (acute kidney injury).   ·  Plan: - Baseline normal   -creatinine 2.16> 2.2  - Likely pre renal due to fluid overload   - Continue on Lasix drip.    Problem/Plan - 4:  ·  Problem: Hypertension.   ·  Plan: - Continue on home medications imdur and metoprolol with holding parameters.    Problem/Plan - 5:  ·  Problem: Prophylactic measure.   ·  Plan: IMPROVE VTE Individual Risk Assessment          RISK                                                          Points  [  ] Previous VTE                                                3  [  ] Thrombophilia                                             2  [  ] Lower limb paralysis                                   2        (unable to hold up >15 seconds)    [  ] Current Cancer                                             2         (within 6 months)  [  ] Immobilization > 24 hrs                              1  [ x ] ICU/CCU stay > 24 hours                             1  [  x] Age > 60                                                         1    IMPROVE VTE Score:         [    2     ]  Eliquis.

## 2022-03-09 NOTE — PROGRESS NOTE ADULT - PROBLEM SELECTOR PLAN 3
- Baseline normal   -creatinine 2.16> 2.2  - Likely pre renal due to fluid overload   - Continue on Lasix

## 2022-03-09 NOTE — PROGRESS NOTE ADULT - SUBJECTIVE AND OBJECTIVE BOX
PGY-1 Progress Note discussed with attending    PAGER #: [[phone]] TILL 5:00 PM  PLEASE CONTACT ON CALL TEAM:  - On Call Team (Please refer to Massimo) FROM 5:00 PM - 8:30PM  - Nightfloat Team FROM 8:30 -7:30 AM      INTERVAL HPI/OVERNIGHT EVENTS: patient had 10 beats of Vtach overnight, while he was alseep, no sxs.       REVIEW OF SYSTEMS:  CONSTITUTIONAL: No fever, weight loss, or fatigue  RESPIRATORY: No cough, wheezing, chills or hemoptysis; No shortness of breath  CARDIOVASCULAR: No chest pain, palpitations, dizziness, or leg swelling  GASTROINTESTINAL: No abdominal pain. No nausea, vomiting, or hematemesis; No diarrhea or constipation. No melena or hematochezia.  GENITOURINARY: No dysuria or hematuria, urinary frequency  NEUROLOGICAL: No headaches, memory loss, loss of strength, numbness, or tremors  SKIN: No itching, burning, rashes, or lesions     Vital Signs Last 24 Hrs  T(C): 36.4 (09 Mar 2022 07:22), Max: 36.6 (08 Mar 2022 11:30)  T(F): 97.5 (09 Mar 2022 07:22), Max: 97.9 (08 Mar 2022 11:30)  HR: 99 (09 Mar 2022 07:22) (77 - 100)  BP: 98/75 (09 Mar 2022 07:22) (96/61 - 114/79)  BP(mean): 80 (08 Mar 2022 16:00) (80 - 80)  RR: 18 (09 Mar 2022 07:22) (17 - 22)  SpO2: 98% (09 Mar 2022 07:22) (95% - 100%)    PHYSICAL EXAMINATION:  GENERAL: NAD, on 3L NC  HEAD:  Atraumatic, Normocephalic  EYES:  conjunctiva and sclera clear  NECK: Supple, No JVD, Normal thyroid  CHEST/LUNG: Clear to auscultation. Clear to percussion bilaterally; No rales, rhonchi, wheezing, or rubs  HEART: Regular rate and rhythm; No murmurs, rubs, or gallops  ABDOMEN: Soft, Nontender, Nondistended; Bowel sounds present  NERVOUS SYSTEM:  Alert & Oriented X3,    EXTREMITIES:  2+ Peripheral Pulses, No clubbing, cyanosis, +1 edema b/l LE  SKIN: warm dry                          13.1   7.15  )-----------( 155      ( 09 Mar 2022 07:05 )             39.9     03-09    140  |  101  |  41<H>  ----------------------------<  84  4.0   |  31  |  2.25<H>    Ca    9.5      09 Mar 2022 07:05  Phos  3.8     03-08  Mg     2.5     03-08    TPro  6.6  /  Alb  3.1<L>  /  TBili  3.1<H>  /  DBili  x   /  AST  264<H>  /  ALT  185<H>  /  AlkPhos  97  03-08    LIVER FUNCTIONS - ( 08 Mar 2022 05:31 )  Alb: 3.1 g/dL / Pro: 6.6 g/dL / ALK PHOS: 97 U/L / ALT: 185 U/L DA / AST: 264 U/L / GGT: x                   CAPILLARY BLOOD GLUCOSE      RADIOLOGY & ADDITIONAL TESTS:

## 2022-03-10 NOTE — PROGRESS NOTE ADULT - SUBJECTIVE AND OBJECTIVE BOX
DATE OF SERVICE:  3/10/2022  Patient was seen and examined on  3/10/2022   .Interim events noted.Consultant notes ,Labs,Telemetry reviewed by me       HOSPITAL COURSE: HPI:  Patient is 89 years old male with past medical history of past medical history of HTN, BPH,latent TB and COPD, CHF who was brought to ED due to shortness of breath that started 2 weeks ago. Patient reports that for the last two weeks, His shortness of breath was getting worse. Patient reports that he wasn't aware that he has heart problem. On physical exam, Patient has diffuse crackles throughout lungs and shortness of breathing when lying down. Patient is saturating 96% on 3L NC in ED. Patient denied chest pain, abdominal pain, N/V or shortness of breath.  (08 Mar 2022 03:29)      INTERIM EVENTS:Patient seen at bedside ,interim events noted.      PMH -reviewed admission note, no change since admission  HEART FAILURE: Acute[ ]Chronic[ ] Systolic[ ] Diastolic[ ] Combined Systolic and Diastolic[ ]  CAD[ ] CABG[ ] PCI[ ]  DEVICES[ ] PPM[ ] ICD[ ] ILR[ ]  ATRIAL FIBRILLATION[ ] Paroxysmal[ ] Permanent[ ]  LUANNE[ ] CKD1[ ] CKD2[ ] CKD3[ ] CKD4[ ] ESRD[ ]  COPD[ ] HTN[ ]   DM[ ] Type1[ ] Type 2[ ]   CVA[ ] Paresis[ ]    AMBULATION: Assisted[ ] Cane/walker[ ] Independent[ ]    MEDICATIONS  (STANDING):  apixaban 2.5 milliGRAM(s) Oral two times a day  aspirin  chewable 81 milliGRAM(s) Oral daily  cholecalciferol 1000 Unit(s) Oral daily  famotidine    Tablet 20 milliGRAM(s) Oral daily  furosemide Infusion 5 mG/Hr (2.5 mL/Hr) IV Continuous <Continuous>  isosorbide   mononitrate ER Tablet (IMDUR) 30 milliGRAM(s) Oral daily  lidocaine   4% Patch 1 Patch Transdermal daily  metoprolol tartrate 25 milliGRAM(s) Oral every 8 hours  simvastatin 10 milliGRAM(s) Oral at bedtime  tamsulosin 0.8 milliGRAM(s) Oral at bedtime    MEDICATIONS  (PRN):  acetaminophen     Tablet .. 650 milliGRAM(s) Oral every 6 hours PRN Moderate Pain (4 - 6)  ALBUTerol    90 MICROgram(s) HFA Inhaler 2 Puff(s) Inhalation every 6 hours PRN Shortness of Breath            REVIEW OF SYSTEMS:  Constitutional: [ ] fever, [ ]weight loss,  [ ]fatigue  Eyes: [ ] visual changes  Respiratory: [ ]shortness of breath;  [ ] cough, [ ]wheezing, [ ]chills, [ ]hemoptysis  Cardiovascular: [ ] chest pain, [ ]palpitations, [ ]dizziness,  [ ]leg swelling[ ]orthopnea[ ]PND  Gastrointestinal: [ ] abdominal pain, [ ]nausea, [ ]vomiting,  [ ]diarrhea [ ]Constipation [ ]Melena  Genitourinary: [ ] dysuria, [ ] hematuria [ ]Bucio  Neurologic: [ ] headaches [ ] tremors[ ]weakness [ ]Paralysis Right[ ] Left[ ]  Skin: [ ] itching, [ ]burning, [ ] rashes  Endocrine: [ ] heat or cold intolerance  Musculoskeletal: [ ] joint pain or swelling; [ ] muscle, back, or extremity pain  Psychiatric: [ ] depression, [ ]anxiety, [ ]mood swings, or [ ]difficulty sleeping  Hematologic: [ ] easy bruising, [ ] bleeding gums    [ ] All remaining systems negative except as per above.   [ ]Unable to obtain.  [x] No change in ROS since admission      Vital Signs Last 24 Hrs  T(C): 36.7 (10 Mar 2022 20:11), Max: 36.7 (10 Mar 2022 20:11)  T(F): 98 (10 Mar 2022 20:11), Max: 98 (10 Mar 2022 20:11)  HR: 120 (10 Mar 2022 20:11) (65 - 135)  BP: 95/62 (10 Mar 2022 20:11) (95/62 - 139/71)  BP(mean): --  RR: 20 (10 Mar 2022 20:11) (18 - 30)  SpO2: 98% (10 Mar 2022 20:11) (94% - 100%)  I&O's Summary    09 Mar 2022 07:01  -  10 Mar 2022 07:00  --------------------------------------------------------  IN: 250 mL / OUT: 775 mL / NET: -525 mL    10 Mar 2022 07:01  -  10 Mar 2022 20:38  --------------------------------------------------------  IN: 345 mL / OUT: 2 mL / NET: 343 mL        PHYSICAL EXAM:  General: No acute distress BMI-  HEENT: EOMI, PERRL  Neck: Supple, [ ] JVD  Lungs: Equal air entry bilaterally; [ ] rales [ ] wheezing [ ] rhonchi  Heart: Regular rate and rhythm; [x ] murmur   2/6 [ x] systolic [ ] diastolic [ ] radiation[ ] rubs [ ]  gallops  Abdomen: Nontender, bowel sounds present  Extremities: No clubbing, cyanosis, [ ] edema [ ]Pulses  equal and intact  Nervous system:  Alert & Oriented X3, no focal deficits  Psychiatric: Normal affect  Skin: No rashes or lesions    LABS:  03-10    138  |  99  |  48<H>  ----------------------------<  115<H>  3.9   |  32<H>  |  2.24<H>    Ca    9.5      10 Mar 2022 06:40  Phos  4.1     03-10  Mg     2.5     03-10      Creatinine Trend: 2.24<--, 2.25<--, 2.16<--, 2.20<--                        13.3   8.10  )-----------( 159      ( 10 Mar 2022 06:40 )             40.1           < from: Transthoracic Echocardiogram (03.09.22 @ 07:02) >  CONCLUSIONS:  1. Mild to moderate mitral regurgitation.  2.  Mild aortic regurgitation.  3. Moderate left ventricular enlargement.  4. Endocardium not well visualized; grossly severe global  left ventricular systolic dysfunction.  5. Right ventricular enlargement with decreased RV systolic  function.  (TAPSE 1.0 cm)  6. Small pericardial effusion.   No echocardiographic  evidence of tamponade physiology.  7. Bilateral pleural effusions.    < end of copied text >

## 2022-03-10 NOTE — PROGRESS NOTE ADULT - SUBJECTIVE AND OBJECTIVE BOX
PGY-1 Progress Note discussed with attending    PAGER #: [[phone]] TILL 5:00 PM  PLEASE CONTACT ON CALL TEAM:  - On Call Team (Please refer to Massimo) FROM 5:00 PM - 8:30PM  - Nightfloat Team FROM 8:30 -7:30 AM      INTERVAL HPI/OVERNIGHT EVENTS: overnight pt had 6 beats of v tach, then at 6am saturated on desaturate on 3L nc to 70%. pt was placed on 15 L nrb and was given 60 total of lasix and solumedrol.       REVIEW OF SYSTEMS:  CONSTITUTIONAL: No fever, weight loss, or fatigue  RESPIRATORY: No cough, wheezing, chills or hemoptysis; + shortness of breath  CARDIOVASCULAR: No chest pain, palpitations, dizziness, or leg swelling  GASTROINTESTINAL: No abdominal pain. No nausea, vomiting, or hematemesis; No diarrhea or constipation. No melena or hematochezia.  GENITOURINARY: No dysuria or hematuria, urinary frequency  NEUROLOGICAL: No headaches, memory loss, loss of strength, numbness, or tremors  SKIN: No itching, burning, rashes, or lesions     Vital Signs Last 24 Hrs  T(C): 36.2 (10 Mar 2022 11:30), Max: 36.6 (09 Mar 2022 15:56)  T(F): 97.2 (10 Mar 2022 11:30), Max: 97.8 (09 Mar 2022 15:56)  HR: 127 (10 Mar 2022 11:30) (65 - 135)  BP: 102/77 (10 Mar 2022 11:30) (102/61 - 125/83)  BP(mean): --  RR: 20 (10 Mar 2022 11:30) (18 - 30)  SpO2: 100% (10 Mar 2022 11:30) (94% - 100%)    PHYSICAL EXAMINATION:  GENERAL: mild distress on 15L NRB  HEAD:  Atraumatic, Normocephalic  EYES:  conjunctiva and sclera clear  NECK: Supple, No JVD, Normal thyroid  CHEST/LUNG: coarse breath sounds bilaterally   HEART: Regular rate and rhythm; No murmurs, rubs, or gallops  ABDOMEN: Soft, Nontender, Nondistended; Bowel sounds present  NERVOUS SYSTEM:  alert and awake, following commands   EXTREMITIES:  2+ Peripheral Pulses, No clubbing, cyanosis, +2 edema   SKIN: warm dry                          13.3   8.10  )-----------( 159      ( 10 Mar 2022 06:40 )             40.1     03-10    138  |  99  |  48<H>  ----------------------------<  115<H>  3.9   |  32<H>  |  2.24<H>    Ca    9.5      10 Mar 2022 06:40  Phos  4.1     03-10  Mg     2.5     03-10                CAPILLARY BLOOD GLUCOSE      RADIOLOGY & ADDITIONAL TESTS:

## 2022-03-10 NOTE — PROGRESS NOTE ADULT - PROBLEM SELECTOR PLAN 3
- Baseline normal   -creatinine 2.16> 2.2  - Likely pre renal due to fluid overload   - Continue on Lasix drip

## 2022-03-10 NOTE — PROGRESS NOTE ADULT - PROBLEM SELECTOR PLAN 1
- Patient with hx of CHF presented  with Shortness of breath   - Echo showed grossly severe global left ventricular systolic dysfunction, and b/l pleural effusions  - overnight pt desaturated and was placed on 15L NRB  - CXR showed worsening congestion   - s/p lasix 60mg IV   - will start on lasix drip

## 2022-03-10 NOTE — PROGRESS NOTE ADULT - PROBLEM SELECTOR PLAN 1
- Patient with hx of CHF presented  with Shortness of breath   - Echo showed grossly severe global left ventricular systolic dysfunction, and b/l pleural effusions  - overnight pt desaturated and was placed on 15L NRB  - CXR showed worsening congestion   - s/p lasix 60mg IV   - Cardiology Dr. Richardson following: will start on lasix drip

## 2022-03-10 NOTE — CHART NOTE - NSCHARTNOTEFT_GEN_A_CORE
Paged by nurse that pt had 6 runs of wide complex beats @Hr 113, patient was seen at bedside , asymptomatic ,was asleep, awakened by writer, fully AAO, denied having SOB , chest pain , palpitation, dizziness. primary team to monitor and adjust electrolytes as needed, avoid hypoxemia, stric I&os , daily weight.   VS: WNL   will continue to monitor on tele. Paged by nurse that pt had 6 runs of wide complex beats @Hr 113, patient was seen at bedside , asymptomatic ,was asleep, awakened by writer, fully AAO, denied having SOB , chest pain , palpitation, dizziness. primary team to monitor and adjust electrolytes as needed, avoid hypoxemia, stric I&os , daily weight.   VS: WNL   will continue to monitor on tele.    Patient around 6:40AM noted to be desaturating in the 70's notified by RN, patient assessed at bedside, placed on 15L NRBM saturating in upper 90's to 100%. Patient AOX3. PE significant for wheezing and course breath sounds throughout all lung fields. Patient given IV lasix, solumedrol and repeat cxr was ordered and ABG. Please follow cxr and ABG.

## 2022-03-11 NOTE — PROCEDURE NOTE - NSURITECHNIQUE_GU_A_CORE
Proper hand hygiene was performed/Sterile gloves were worn for the duration of the procedure/A sterile drape was used to cover all adjacent areas/The site was cleaned with soap/water and sterile solution (betadine)/The catheter was appropriately lubricated/The collection bag is below the level of the patient and urinary bladder/All applicable medical record documentation is completed

## 2022-03-11 NOTE — PROGRESS NOTE ADULT - PROBLEM SELECTOR PLAN 4
- Continue on home medications imdur and metoprolol with holding parameters

## 2022-03-11 NOTE — CONSULT NOTE ADULT - SUBJECTIVE AND OBJECTIVE BOX
Patient is a 89y old  Male who presents with a chief complaint of CHF exacerbation (10 Mar 2022 09:56)      HPI:  Patient is 89 years old male with past medical history of past medical history of HTN, BPH,latent TB and COPD, CHF who was brought to ED due to shortness of breath that started 2 weeks ago. Patient reports that for the last two weeks, His shortness of breath was getting worse. Patient reports that he wasn't aware that he has heart problem. On physical exam, Patient has diffuse crackles throughout lungs and shortness of breathing when lying down. Patient is saturating 96% on 3L NC in ED. Patient denied chest pain, abdominal pain, N/V or shortness of breath.  (08 Mar 2022 03:29)    Hospital course:   Oxygen requirement increased over course of admission due to pulmonary edema , patient started on lasix drip 2.5 ml/hr, pt refused dupont , I7Os not fully documented , daily weight increased from 160 to 186, accuracy is questionable. paged by nurse that patient is saturating in low 87 on 6 L NC , placed on NRB 15 liters with improvement in saturation to 98% , patient uses accessory muscles for breathing, MS at baseline , repeat CXR still congested with mild improvement in right upper lung , ABG - ( 11 Mar 2022 06:18 ) pH, Arterial: 7.41 /  pCO2: 41  /  pO2: 122 / HCO3: 26  / Base Excess: 1.2  /  SaO2: 99, BED side Lung Pocus showed diffuse B lines in anterior lung fields bilateral , Bilateral pleural effusion R>L , a flap of floating atelectatic lung seen in right pleural effusion. Lasix drip rate increased to 10ml/hour , patient refused BI-PAP, bladder scan showed 210cc retaining, pt endorses difficulty urinating, however refused Dupont      PAST MEDICAL & SURGICAL HISTORY:  History of hypertension    Hypercholesterolemia    BPH (benign prostatic hyperplasia)    History of COPD        SOCIAL HX:   Smoking                         ETOH                            Other    FAMILY HISTORY:  :  No known cardiovacular family hisotry     ROS:  See HPI     Allergies    No Known Allergies    Intolerances          PHYSICAL EXAM    ICU Vital Signs Last 24 Hrs  T(C): 36.9 (11 Mar 2022 04:28), Max: 37 (10 Mar 2022 23:45)  T(F): 98.4 (11 Mar 2022 04:28), Max: 98.6 (10 Mar 2022 23:45)  HR: 104 (11 Mar 2022 04:28) (77 - 135)  BP: 130/66 (11 Mar 2022 04:28) (95/62 - 139/71)  BP(mean): --  ABP: --  ABP(mean): --  RR: 26 (11 Mar 2022 04:28) (20 - 30)  SpO2: 90% (11 Mar 2022 04:28) (90% - 100%)      General: In respiratory distress  HEENT:  [place]              Lungs: Bilateral crackles , wheezing   Cardiovascular: Regular  Gastrointestinal: Soft, Positive BS  Musculoskeletal: No clubbing.  Moves all extremities.    Skin: Warm.  Intact  Neurological: No motor or sensory deficit       22 @ 07:01  -  03-10-22 @ 07:00  --------------------------------------------------------  IN:    Oral Fluid: 250 mL  Total IN: 250 mL    OUT:    Voided (mL): 775 mL  Total OUT: 775 mL    Total NET: -525 mL      03-10-22 @ 07:01  -  22 @ 06:14  --------------------------------------------------------  IN:    Furosemide: 15 mL    Oral Fluid: 330 mL  Total IN: 345 mL    OUT:    Incontinent per Diaper, Weight (mL): 2 mL  Total OUT: 2 mL    Total NET: 343 mL          LABS:                          13.3   8.10  )-----------( 159      ( 10 Mar 2022 06:40 )             40.1                                               03-10    138  |  99  |  48<H>  ----------------------------<  115<H>  3.9   |  32<H>  |  2.24<H>    Ca    9.5      10 Mar 2022 06:40  Phos  4.1     03-10  Mg     2.5     03-10                                               Urinalysis Basic - ( 10 Mar 2022 23:04 )    Color: Yellow / Appearance: Clear / S.010 / pH: x  Gluc: x / Ketone: Negative  / Bili: Negative / Urobili: Negative   Blood: x / Protein: Negative / Nitrite: Negative   Leuk Esterase: Negative / RBC: x / WBC x   Sq Epi: x / Non Sq Epi: x / Bacteria: x                                                                                         Culture - Blood (collected 08 Mar 2022 06:15)  Source: .Blood Blood-Peripheral  Preliminary Report (09 Mar 2022 07:01):    No growth to date.    Culture - Blood (collected 08 Mar 2022 06:15)  Source: .Blood Blood-Peripheral  Preliminary Report (09 Mar 2022 07:01):    No growth to date.                                                                                           CXR: Congested lungs     ECHO: EF 20-25%    MEDICATIONS  (STANDING):  apixaban 2.5 milliGRAM(s) Oral two times a day  aspirin  chewable 81 milliGRAM(s) Oral daily  cholecalciferol 1000 Unit(s) Oral daily  famotidine    Tablet 20 milliGRAM(s) Oral daily  furosemide Infusion 5 mG/Hr (2.5 mL/Hr) IV Continuous <Continuous>  isosorbide   mononitrate ER Tablet (IMDUR) 30 milliGRAM(s) Oral daily  lidocaine   4% Patch 1 Patch Transdermal daily  metoprolol tartrate 25 milliGRAM(s) Oral every 8 hours  simvastatin 10 milliGRAM(s) Oral at bedtime  tamsulosin 0.8 milliGRAM(s) Oral at bedtime    MEDICATIONS  (PRN):  acetaminophen     Tablet .. 650 milliGRAM(s) Oral every 6 hours PRN Moderate Pain (4 - 6)  ALBUTerol    90 MICROgram(s) HFA Inhaler 2 Puff(s) Inhalation every 6 hours PRN Shortness of Breath  guaiFENesin Oral Liquid (Sugar-Free) 100 milliGRAM(s) Oral every 6 hours PRN Cough         Patient is a 89y old  Male who presents with a chief complaint of CHF exacerbation (10 Mar 2022 09:56)      HPI:  Patient is 89 years old male with past medical history of past medical history of HTN, BPH,latent TB and COPD, CHF who was brought to ED due to shortness of breath that started 2 weeks ago. Patient reports that for the last two weeks, His shortness of breath was getting worse. Patient reports that he wasn't aware that he has heart problem. On physical exam, Patient has diffuse crackles throughout lungs and shortness of breathing when lying down. Patient is saturating 96% on 3L NC in ED. Patient denied chest pain, abdominal pain, N/V or shortness of breath.  (08 Mar 2022 03:29)    Hospital course:   Oxygen requirement increased over course of admission due to pulmonary edema , patient started on lasix drip 2.5 ml/hr, pt refused dupont , I7Os not fully documented , daily weight increased from 160 to 186, accuracy is questionable. paged by nurse that patient is saturating in low 87 on 6 L NC , placed on NRB 15 liters with improvement in saturation to 98% , patient uses accessory muscles for breathing, MS at baseline , repeat CXR still congested with mild improvement in right upper lung , ABG - ( 11 Mar 2022 06:18 ) pH, Arterial: 7.41 /  pCO2: 41  /  pO2: 122 / HCO3: 26  / Base Excess: 1.2  /  SaO2: 99, BED side Lung Pocus showed diffuse B lines in anterior lung fields bilateral , Bilateral pleural effusion R>L , a flap of floating atelectatic lung seen in right pleural effusion. Lasix drip rate increased to 5ml/hour , patient placed on BI-PAP, bladder scan showed 210cc retaining, pt endorses difficulty urinating, however refused Dupont      PAST MEDICAL & SURGICAL HISTORY:  History of hypertension    Hypercholesterolemia    BPH (benign prostatic hyperplasia)    History of COPD        SOCIAL HX:   Smoking                         ETOH                            Other    FAMILY HISTORY:  :  No known cardiovacular family hisotry     ROS:  See HPI     Allergies    No Known Allergies    Intolerances          PHYSICAL EXAM    ICU Vital Signs Last 24 Hrs  T(C): 36.9 (11 Mar 2022 04:28), Max: 37 (10 Mar 2022 23:45)  T(F): 98.4 (11 Mar 2022 04:28), Max: 98.6 (10 Mar 2022 23:45)  HR: 104 (11 Mar 2022 04:28) (77 - 135)  BP: 130/66 (11 Mar 2022 04:28) (95/62 - 139/71)  BP(mean): --  ABP: --  ABP(mean): --  RR: 26 (11 Mar 2022 04:28) (20 - 30)  SpO2: 90% (11 Mar 2022 04:28) (90% - 100%)      General: In respiratory distress  HEENT:  [place]              Lungs: Bilateral crackles , wheezing   Cardiovascular: Regular  Gastrointestinal: Soft, Positive BS  Musculoskeletal: No clubbing.  Moves all extremities.    Skin: Warm.  Intact  Neurological: No motor or sensory deficit       22 @ 07:01  -  03-10-22 @ 07:00  --------------------------------------------------------  IN:    Oral Fluid: 250 mL  Total IN: 250 mL    OUT:    Voided (mL): 775 mL  Total OUT: 775 mL    Total NET: -525 mL      03-10-22 @ 07:01  -  22 @ 06:14  --------------------------------------------------------  IN:    Furosemide: 15 mL    Oral Fluid: 330 mL  Total IN: 345 mL    OUT:    Incontinent per Diaper, Weight (mL): 2 mL  Total OUT: 2 mL    Total NET: 343 mL          LABS:                          13.3   8.10  )-----------( 159      ( 10 Mar 2022 06:40 )             40.1                                               03-10    138  |  99  |  48<H>  ----------------------------<  115<H>  3.9   |  32<H>  |  2.24<H>    Ca    9.5      10 Mar 2022 06:40  Phos  4.1     03-10  Mg     2.5     03-10                                               Urinalysis Basic - ( 10 Mar 2022 23:04 )    Color: Yellow / Appearance: Clear / S.010 / pH: x  Gluc: x / Ketone: Negative  / Bili: Negative / Urobili: Negative   Blood: x / Protein: Negative / Nitrite: Negative   Leuk Esterase: Negative / RBC: x / WBC x   Sq Epi: x / Non Sq Epi: x / Bacteria: x                                                                                         Culture - Blood (collected 08 Mar 2022 06:15)  Source: .Blood Blood-Peripheral  Preliminary Report (09 Mar 2022 07:01):    No growth to date.    Culture - Blood (collected 08 Mar 2022 06:15)  Source: .Blood Blood-Peripheral  Preliminary Report (09 Mar 2022 07:01):    No growth to date.                                                                                           CXR: Congested lungs     ECHO: EF 20-25%    MEDICATIONS  (STANDING):  apixaban 2.5 milliGRAM(s) Oral two times a day  aspirin  chewable 81 milliGRAM(s) Oral daily  cholecalciferol 1000 Unit(s) Oral daily  famotidine    Tablet 20 milliGRAM(s) Oral daily  furosemide Infusion 5 mG/Hr (2.5 mL/Hr) IV Continuous <Continuous>  isosorbide   mononitrate ER Tablet (IMDUR) 30 milliGRAM(s) Oral daily  lidocaine   4% Patch 1 Patch Transdermal daily  metoprolol tartrate 25 milliGRAM(s) Oral every 8 hours  simvastatin 10 milliGRAM(s) Oral at bedtime  tamsulosin 0.8 milliGRAM(s) Oral at bedtime    MEDICATIONS  (PRN):  acetaminophen     Tablet .. 650 milliGRAM(s) Oral every 6 hours PRN Moderate Pain (4 - 6)  ALBUTerol    90 MICROgram(s) HFA Inhaler 2 Puff(s) Inhalation every 6 hours PRN Shortness of Breath  guaiFENesin Oral Liquid (Sugar-Free) 100 milliGRAM(s) Oral every 6 hours PRN Cough         Patient is a 89y old  Male who presents with a chief complaint of CHF exacerbation (10 Mar 2022 09:56)      HPI:  Patient is 89 years old male with past medical history of past medical history of HTN, BPH,latent TB and COPD, CHF who was brought to ED due to shortness of breath that started 2 weeks ago. Patient reports that for the last two weeks, His shortness of breath was getting worse. Patient reports that he wasn't aware that he has heart problem. On physical exam, Patient has diffuse crackles throughout lungs and shortness of breathing when lying down. Patient is saturating 96% on 3L NC in ED. Patient denied chest pain, abdominal pain, N/V or shortness of breath.  (08 Mar 2022 03:29)    Hospital course:   Oxygen requirement increased over course of admission due to pulmonary edema , patient started on lasix drip 2.5 ml/hr, pt refused dupont , I&Os not fully documented , daily weight increased from 160 to 186, accuracy is questionable. paged by nurse that patient is saturating  87 on 6 L NC , placed on NRB 15 liters with improvement in saturation to 98% , patient uses accessory muscles for breathing, MS at baseline , repeat CXR still congested with mild improvement in right upper lung , ABG - ( 11 Mar 2022 06:18 ) pH, Arterial: 7.41 /  pCO2: 41  /  pO2: 122 / HCO3: 26  / Base Excess: 1.2  /  SaO2: 99, BED side Lung Pocus showed diffuse B lines in anterior lung fields bilateral , Bilateral pleural effusion R>L , a flap of floating atelectatic lung seen in right pleural effusion. Lasix drip rate increased to 5ml/hour , patient placed on BI-PAP, bladder scan showed 210cc retaining, pt endorses difficulty urinating, however refused Dupont.      PAST MEDICAL & SURGICAL HISTORY:  History of hypertension    Hypercholesterolemia    BPH (benign prostatic hyperplasia)    History of COPD        SOCIAL HX:   Smoking                         ETOH                            Other    FAMILY HISTORY:  :  No known cardiovacular family hisotry     ROS:  See HPI     Allergies    No Known Allergies    Intolerances          PHYSICAL EXAM    ICU Vital Signs Last 24 Hrs  T(C): 36.9 (11 Mar 2022 04:28), Max: 37 (10 Mar 2022 23:45)  T(F): 98.4 (11 Mar 2022 04:28), Max: 98.6 (10 Mar 2022 23:45)  HR: 104 (11 Mar 2022 04:28) (77 - 135)  BP: 130/66 (11 Mar 2022 04:28) (95/62 - 139/71)  BP(mean): --  ABP: --  ABP(mean): --  RR: 26 (11 Mar 2022 04:28) (20 - 30)  SpO2: 90% (11 Mar 2022 04:28) (90% - 100%)      General: In respiratory distress  HEENT:  [place]              Lungs: Bilateral crackles , wheezing   Cardiovascular: Regular  Gastrointestinal: Soft, Positive BS  Musculoskeletal: No clubbing.  Moves all extremities.    Skin: Warm.  Intact  Neurological: No motor or sensory deficit       22 @ 07:01  -  03-10-22 @ 07:00  --------------------------------------------------------  IN:    Oral Fluid: 250 mL  Total IN: 250 mL    OUT:    Voided (mL): 775 mL  Total OUT: 775 mL    Total NET: -525 mL      03-10-22 @ 07:01  -  22 @ 06:14  --------------------------------------------------------  IN:    Furosemide: 15 mL    Oral Fluid: 330 mL  Total IN: 345 mL    OUT:    Incontinent per Diaper, Weight (mL): 2 mL  Total OUT: 2 mL    Total NET: 343 mL          LABS:                          13.3   8.10  )-----------( 159      ( 10 Mar 2022 06:40 )             40.1                                               03-10    138  |  99  |  48<H>  ----------------------------<  115<H>  3.9   |  32<H>  |  2.24<H>    Ca    9.5      10 Mar 2022 06:40  Phos  4.1     03-10  Mg     2.5     03-10                                               Urinalysis Basic - ( 10 Mar 2022 23:04 )    Color: Yellow / Appearance: Clear / S.010 / pH: x  Gluc: x / Ketone: Negative  / Bili: Negative / Urobili: Negative   Blood: x / Protein: Negative / Nitrite: Negative   Leuk Esterase: Negative / RBC: x / WBC x   Sq Epi: x / Non Sq Epi: x / Bacteria: x                                                                                         Culture - Blood (collected 08 Mar 2022 06:15)  Source: .Blood Blood-Peripheral  Preliminary Report (09 Mar 2022 07:01):    No growth to date.    Culture - Blood (collected 08 Mar 2022 06:15)  Source: .Blood Blood-Peripheral  Preliminary Report (09 Mar 2022 07:01):    No growth to date.                                                                                           CXR: Congested lungs     ECHO: EF 20-25%    MEDICATIONS  (STANDING):  apixaban 2.5 milliGRAM(s) Oral two times a day  aspirin  chewable 81 milliGRAM(s) Oral daily  cholecalciferol 1000 Unit(s) Oral daily  famotidine    Tablet 20 milliGRAM(s) Oral daily  furosemide Infusion 5 mG/Hr (2.5 mL/Hr) IV Continuous <Continuous>  isosorbide   mononitrate ER Tablet (IMDUR) 30 milliGRAM(s) Oral daily  lidocaine   4% Patch 1 Patch Transdermal daily  metoprolol tartrate 25 milliGRAM(s) Oral every 8 hours  simvastatin 10 milliGRAM(s) Oral at bedtime  tamsulosin 0.8 milliGRAM(s) Oral at bedtime    MEDICATIONS  (PRN):  acetaminophen     Tablet .. 650 milliGRAM(s) Oral every 6 hours PRN Moderate Pain (4 - 6)  ALBUTerol    90 MICROgram(s) HFA Inhaler 2 Puff(s) Inhalation every 6 hours PRN Shortness of Breath  guaiFENesin Oral Liquid (Sugar-Free) 100 milliGRAM(s) Oral every 6 hours PRN Cough

## 2022-03-11 NOTE — PROGRESS NOTE ADULT - PROBLEM SELECTOR PLAN 1
- Patient with hx of CHF presented  with Shortness of breath   - Echo showed grossly severe global left ventricular systolic dysfunction, and b/l pleural effusions  - overnight pt desaturated and was placed on 15L NRB  - CXR showed worsening congestion   - s/p lasix 60mg IV   -  start on lasix drip

## 2022-03-11 NOTE — PROGRESS NOTE ADULT - SUBJECTIVE AND OBJECTIVE BOX
PATIENT SEEN AND EXAMINED ON :- 3/11/2022  DATE OF SERVICE:   3/11/2022      Interim events noted,Labs ,Radiological studies and Cardiology tests reviewed .         HOSPITAL COURSE: HPI:  Patient is 89 years old male with past medical history of past medical history of HTN, BPH,latent TB and COPD, CHF who was brought to ED due to shortness of breath that started 2 weeks ago. Patient reports that for the last two weeks, His shortness of breath was getting worse. Patient reports that he wasn't aware that he has heart problem. On physical exam, Patient has diffuse crackles throughout lungs and shortness of breathing when lying down. Patient is saturating 96% on 3L NC in ED. Patient denied chest pain, abdominal pain, N/V or shortness of breath.  (08 Mar 2022 03:29)      INTERIM EVENTS:Patient seen at bedside ,interim events noted.      PMH -reviewed admission note, no change since admission  HEART FAILURE: Acute[ ]Chronic[ ] Systolic[ ] Diastolic[ ] Combined Systolic and Diastolic[ ]  CAD[ ] CABG[ ] PCI[ ]  DEVICES[ ] PPM[ ] ICD[ ] ILR[ ]  ATRIAL FIBRILLATION[ ] Paroxysmal[ ] Permanent[ ]  LUANNE[ ] CKD1[ ] CKD2[ ] CKD3[ ] CKD4[ ] ESRD[ ]  COPD[ ] HTN[ ]   DM[ ] Type1[ ] Type 2[ ]   CVA[ ] Paresis[ ]    AMBULATION: Assisted[ ] Cane/walker[ ] Independent[ ]    MEDICATIONS  (STANDING):  acetylcysteine 20% for bronchoscopy 4 milliLiter(s) Nebulizer once  apixaban 2.5 milliGRAM(s) Oral two times a day  aspirin  chewable 81 milliGRAM(s) Oral daily  azithromycin  IVPB      cefTRIAXone   IVPB 1000 milliGRAM(s) IV Intermittent every 24 hours  chlorhexidine 2% Cloths 1 Application(s) Topical daily  cholecalciferol 1000 Unit(s) Oral daily  dexMEDEtomidine Infusion 0.5 MICROgram(s)/kG/Hr (8.98 mL/Hr) IV Continuous <Continuous>  famotidine    Tablet 20 milliGRAM(s) Oral daily  fentaNYL   Infusion. 2 MICROgram(s)/kG/Hr (14.4 mL/Hr) IV Continuous <Continuous>  furosemide   Injectable 60 milliGRAM(s) IV Push every 12 hours  ipratropium 17 MICROgram(s) HFA Inhaler 1 Puff(s) Inhalation every 6 hours  isosorbide   mononitrate ER Tablet (IMDUR) 30 milliGRAM(s) Oral daily  lidocaine   4% Patch 1 Patch Transdermal daily  metoprolol tartrate 12.5 milliGRAM(s) Oral every 12 hours  norepinephrine Infusion 0.05 MICROgram(s)/kG/Min (6.73 mL/Hr) IV Continuous <Continuous>  propofol Infusion 5 MICROgram(s)/kG/Min (2.15 mL/Hr) IV Continuous <Continuous>  simvastatin 10 milliGRAM(s) Oral at bedtime  tamsulosin 0.8 milliGRAM(s) Oral at bedtime    MEDICATIONS  (PRN):  acetaminophen     Tablet .. 650 milliGRAM(s) Oral every 6 hours PRN Moderate Pain (4 - 6)  ALBUTerol    90 MICROgram(s) HFA Inhaler 2 Puff(s) Inhalation every 6 hours PRN Shortness of Breath  ALBUTerol    90 MICROgram(s) HFA Inhaler 2 Puff(s) Inhalation every 6 hours PRN Shortness of Breath and/or Wheezing            REVIEW OF SYSTEMS:  Constitutional: [ ] fever, [ ]weight loss,  [ ]fatigue  Eyes: [ ] visual changes  Respiratory: [ ]shortness of breath;  [ ] cough, [ ]wheezing, [ ]chills, [ ]hemoptysis  Cardiovascular: [ ] chest pain, [ ]palpitations, [ ]dizziness,  [ ]leg swelling[ ]orthopnea[ ]PND  Gastrointestinal: [ ] abdominal pain, [ ]nausea, [ ]vomiting,  [ ]diarrhea [ ]Constipation [ ]Melena  Genitourinary: [ ] dysuria, [ ] hematuria [ ]Bucio  Neurologic: [ ] headaches [ ] tremors[ ]weakness [ ]Paralysis Right[ ] Left[ ]  Skin: [ ] itching, [ ]burning, [ ] rashes  Endocrine: [ ] heat or cold intolerance  Musculoskeletal: [ ] joint pain or swelling; [ ] muscle, back, or extremity pain  Psychiatric: [ ] depression, [ ]anxiety, [ ]mood swings, or [ ]difficulty sleeping  Hematologic: [ ] easy bruising, [ ] bleeding gums    [ ] All remaining systems negative except as per above.   [ ]Unable to obtain.  [x] No change in ROS since admission      Vital Signs Last 24 Hrs  T(C): 36.1 (11 Mar 2022 20:00), Max: 37 (10 Mar 2022 23:45)  T(F): 96.9 (11 Mar 2022 20:00), Max: 98.6 (10 Mar 2022 23:45)  HR: 103 (11 Mar 2022 20:35) (76 - 135)  BP: 90/70 (11 Mar 2022 19:00) (65/41 - 130/66)  BP(mean): 78 (11 Mar 2022 19:00) (46 - 129)  RR: 12 (11 Mar 2022 19:00) (12 - 35)  SpO2: 100% (11 Mar 2022 20:35) (82% - 100%)  I&O's Summary    10 Mar 2022 07:01  -  11 Mar 2022 07:00  --------------------------------------------------------  IN: 345 mL / OUT: 2 mL / NET: 343 mL    11 Mar 2022 07:01  -  11 Mar 2022 21:23  --------------------------------------------------------  IN: 85.7 mL / OUT: 0 mL / NET: 85.7 mL        PHYSICAL EXAM:  General: No acute distress BMI-  HEENT: EOMI, PERRL  Neck: Supple, [ ] JVD  Lungs: Equal air entry bilaterally; [ ] rales [ ] wheezing [ ] rhonchi  Heart: Regular rate and rhythm; [x ] murmur   2/6 [ x] systolic [ ] diastolic [ ] radiation[ ] rubs [ ]  gallops  Abdomen: Nontender, bowel sounds present  Extremities: No clubbing, cyanosis, [ ] edema [ ]Pulses  equal and intact  Nervous system:  Alert & Oriented X3, no focal deficits  Psychiatric: Normal affect  Skin: No rashes or lesions    LABS:  03-11    136  |  99  |  57<H>  ----------------------------<  98  4.8   |  28  |  2.80<H>    Ca    9.5      11 Mar 2022 15:50  Phos  5.1     03-11  Mg     2.6     03-11      Creatinine Trend: 2.80<--, 2.63<--, 2.24<--, 2.25<--, 2.16<--, 2.20<--                        14.9   9.71  )-----------( 207      ( 11 Mar 2022 06:59 )             44.5

## 2022-03-11 NOTE — PROGRESS NOTE ADULT - PROBLEM SELECTOR PLAN 5
IMPROVE VTE Individual Risk Assessment          RISK                                                          Points  [  ] Previous VTE                                                3  [  ] Thrombophilia                                             2  [  ] Lower limb paralysis                                   2        (unable to hold up >15 seconds)    [  ] Current Cancer                                             2         (within 6 months)  [  ] Immobilization > 24 hrs                              1  [ x ] ICU/CCU stay > 24 hours                             1  [  x] Age > 60                                                         1    IMPROVE VTE Score:         [    2     ]  Eliquis

## 2022-03-11 NOTE — CONSULT NOTE ADULT - ASSESSMENT
89 years old male with history of HTN, BPH,latent TB and COPD, CHF who was brought to ED due to shortness of breath that started 2 weeks ago. Patient admitted on tele for acute on chronic CHF. Oxygen requirement increased over course of admission due to pulmonary edema , patient started on lasix drip 2.5 ml/hr, pt refused dupont , I7Os not fully documented , daily weight increased from 160 to 186, accuracy is questionable.  I was paged by nurse that patient is saturating in low 80s on 6 L NC , placed on NRB 15 liters with improvement in saturation to 98% , patient uses accessory muscles for breathing, MS at baseline , repeat CXR still congested with mild improvement in right upper lung , ABG - ( 11 Mar 2022 06:18 ) pH, Arterial: 7.41 /  pCO2: 41  /  pO2: 122 / HCO3: 26  / Base Excess: 1.2  /  SaO2: 99,  bed side Lung Pocus showed predominant  diffuse B lines, b/l anterior lung fields, Bilateral pleural effusion R>R , A flap of floating atelectatic lung seen in right pleural effusion. IVC plum however compressible,  could not measure maximum diameter due to patients constant coughing , Lasix drip rate increased to 10ml/hour , patient refused BI-PAP. Bladder scan showed 210cc retaining, pt endorses difficulty urinating, however refused Dupont.     Assessment and plan :   1- Acute hypoxic respiratory failure 2/2 CHF exacerbation  - please refer to  note above.   - c/w lasix drip at 5ml/hour for now   - c/w supplemental oxygen therapy and titrate down   - monitor mental status,  BP and volume status    - Strict I&Os   - daily weight  -  patient refuses Bi- PAP for now, if decompensates further , requires to re- address GOC  - Plase re-consult ICU as indicated     89 years old male with history of HTN, BPH,latent TB and COPD, CHF who was brought to ED due to shortness of breath that started 2 weeks ago. Patient admitted on tele for acute on chronic CHF. Oxygen requirement increased over course of admission due to pulmonary edema , patient started on lasix drip 2.5 ml/hr, pt refused dupont , I7Os not fully documented , daily weight increased from 160 to 186, accuracy is questionable.  I was paged by nurse that patient is saturating in low 80s on 6 L NC , placed on NRB 15 liters with improvement in saturation to 98% , patient uses accessory muscles for breathing, MS at baseline , repeat CXR still congested with mild improvement in right upper lung , ABG - ( 11 Mar 2022 06:18 ) pH, Arterial: 7.41 /  pCO2: 41  /  pO2: 122 / HCO3: 26  / Base Excess: 1.2  /  SaO2: 99,  bed side Lung Pocus showed predominant  diffuse B lines, b/l anterior lung fields, Bilateral pleural effusion R>R , A flap of floating atelectatic lung seen in right pleural effusion. IVC plum however compressible,  could not measure maximum diameter due to patients constant coughing , Lasix drip rate increased to 10ml/hour , patient refused BI-PAP. Bladder scan showed 210cc retaining, pt endorses difficulty urinating, however refused Dupont.     Assessment and plan :    #Acute hypoxic respiratory failure 2/2 CHF exacerbation  #Atrial fibrillation  #LUANNE  #COPD  #HTN  #BPH    Neuro  AAOX3, no issues     Cardio   #Acute hypoxic respiratory failure 2/2 CHF exacerbation  -p/w SOB  - c/w lasix drip at 5ml/hour for now   - Pt was hypoxic and tachypneic on NRB, was put on BiPAP for increased work of breathing   -Echo showed grossly severe global left ventricular systolic dysfunction, and b/l pleural effusions  - ABG 7.41/41/122/26 on 10 l NRB  -CXR shows worsening b/l pleural effusion   - Strict I&Os   - daily weight    #Afib  -c/w eliquis and metoprolol     #HTN  -c/w imdur and metoprolol     Pulm   #Acute hypoxic respiratory failure 2/2 CHF exacerbation  -Plan as above     #COPD  -c/w Albuterol inhaler prn    GI   No active issues    Renal  #LUANNE  -Cr 2.63  -Baseline Cr is normal  -Likely pre-renal LUANNE due to CHF  -c/w lasix  -Monitor BUN/Cr     Endo  No active issues    ID  No active issues    Prophylaxis  Eliquis for DVT ppx    GOC  DNR, Trial of intubation                                                           89 years old male with history of HTN, BPH,latent TB and COPD, CHF who was brought to ED due to shortness of breath that started 2 weeks ago. Patient admitted on tele for acute on chronic CHF. Oxygen requirement increased over course of admission due to pulmonary edema , patient started on lasix drip 2.5 ml/hr, pt refused dupont , I7Os not fully documented , daily weight increased from 160 to 186, accuracy is questionable.  I was paged by nurse that patient is saturating in low 80s on 6 L NC , placed on NRB 15 liters with improvement in saturation to 98% , patient uses accessory muscles for breathing, MS at baseline , repeat CXR still congested with mild improvement in right upper lung , ABG - ( 11 Mar 2022 06:18 ) pH, Arterial: 7.41 /  pCO2: 41  /  pO2: 122 / HCO3: 26  / Base Excess: 1.2  /  SaO2: 99,  bed side Lung Pocus showed predominant  diffuse B lines, b/l anterior lung fields, Bilateral pleural effusion R>R , A flap of floating atelectatic lung seen in right pleural effusion. IVC plum however compressible,  could not measure maximum diameter due to patients constant coughing , Lasix drip rate increased to 5ml/hour , patient placed on BI-PAP. Bladder scan showed 210cc retaining, pt endorses difficulty urinating, however refused Dupont.     Assessment and plan :    #Acute hypoxic respiratory failure 2/2 CHF exacerbation  #Atrial fibrillation  #LUANNE  #COPD  #HTN  #BPH    Neuro  AAOX3, no issues     Cardio   #Acute hypoxic respiratory failure 2/2 CHF exacerbation  -p/w SOB  - c/w lasix drip at 5ml/hour for now   - Pt was hypoxic and tachypneic on NRB, was put on BiPAP for increased work of breathing   -Echo showed grossly severe global left ventricular systolic dysfunction, and b/l pleural effusions  - ABG 7.41/41/122/26 on 10 l NRB  -CXR shows worsening b/l pleural effusion   - Strict I&Os   - daily weight    #Afib  -c/w eliquis and metoprolol     #HTN  -c/w imdur and metoprolol     Pulm   #Acute hypoxic respiratory failure 2/2 CHF exacerbation  -c/w Lasix drip  -Placed on BiPAP for increased work of breathing    #COPD  -c/w Albuterol inhaler prn    GI   No active issues    Renal  #LUANNE  -Cr 2.63  -Baseline Cr is normal  -Likely pre-renal LUANNE due to CHF  -c/w lasix  -Monitor BUN/Cr     Endo  No active issues    ID  No active issues    Prophylaxis  Eliquis for DVT ppx    GOC  DNR, Trial of intubation   89 years old male with history of HTN, BPH,latent TB and COPD, CHF who was brought to ED due to shortness of breath that started 2 weeks ago. Patient admitted on tele for acute on chronic CHF. Oxygen requirement increased over course of admission due to pulmonary edema , patient started on lasix drip 2.5 ml/hr, pt refused dupont , I&Os not fully documented , daily weight increased from 160 to 186, accuracy is questionable.  I was paged by nurse that patient is saturating in 80s on 6 L NC , placed on NRB 15 liters with improvement in saturation to 98% , patient uses accessory muscles for breathing, MS at baseline , repeat CXR still congested with mild improvement in right upper lung , ABG - ( 11 Mar 2022 06:18 ) pH, Arterial: 7.41 /  pCO2: 41  /  pO2: 122 / HCO3: 26  / Base Excess: 1.2  /  SaO2: 99,  bed side Lung Pocus showed predominant  diffuse B lines in b/l anterior lung fields, Bilateral pleural effusion R>L , A flap of floating atelectatic lung seen in right pleural effusion. IVC plum however compressible,  could not measure maximum diameter due to patients constant coughing , Lasix drip rate increased to 5ml/hour , patient placed on BI-PAP. Bladder scan showed 210cc retaining, pt endorses difficulty urinating, however refused Dupont. Pt transferred to ICU requiring new Bi-Pap.     Assessment and plan :    #Acute hypoxic respiratory failure 2/2 CHF exacerbation  #Atrial fibrillation  #LUANNE  #COPD  #HTN  #BPH    Neuro  AAOX3, no issues     Cardio   #Acute hypoxic respiratory failure 2/2 CHF exacerbation  -p/w SOB  - c/w lasix drip at 5ml/hour for now   - Pt was hypoxic and tachypneic on NRB, was put on BiPAP for increased work of breathing   -Echo showed grossly severe global left ventricular systolic dysfunction, and b/l pleural effusions  - ABG 7.41/41/122/26 on 10 l NRB  -CXR shows worsening b/l pleural effusion   - Strict I&Os   - daily weight    #Afib  -c/w eliquis and metoprolol     #HTN  -c/w imdur and metoprolol     Pulm   #Acute hypoxic respiratory failure 2/2 CHF exacerbation  -c/w Lasix drip  -Placed on BiPAP for increased work of breathing    #COPD  -c/w Albuterol inhaler prn    GI   No active issues    Renal  #LUANNE  -Cr 2.63  -Baseline Cr is normal  -Likely pre-renal LUANNE due to CHF  -c/w lasix  -Monitor BUN/Cr     Endo  No active issues    ID  No active issues    Prophylaxis  Eliquis for DVT ppx    GOC  DNR, Trial of intubation

## 2022-03-11 NOTE — CONSULT NOTE ADULT - ATTENDING COMMENTS
IMP: This is a 89 years old white man  with  HTN, BPH,latent TB and COPD, CHF who was brought to ED due to shortness of breath that started 2 weeks ago. Patient admitted on tele for acute on chronic CHF. Oxygen requirement increased over course of admission due to pulmonary edema , patient started on lasix drip 2.5 ml/hr, pt refused dupont , I&Os not fully documented , daily weight increased from 160 to 186, accuracy is questionable.  Pat started on BiPaP for resp distress and increase work of bleeding      Assessment     - Acute hypoxic respiratory failure 2/2 CHF exacerbation  - Pulmonary edema   - Atrial fibrillation  - LUANNE  - COPD   - HTN  - BPH      Plan     - Transfer to ICU   - Continue BiPaP with O2 supp to maitain sat >88%  - Combivent neb   - Hemodynamic monitoring   - Change lasix gtt to IVP  keep neg fluid balance   - Pat has cough stated he is unable to cough up secretions   - Mucomyst prn   - Diet   - Continue anticoag   - Monitor BP with meds IMP: This is a 89 years old white man  with  HTN, BPH,latent TB and COPD, CHF who was brought to ED due to shortness of breath that started 2 weeks ago. Patient admitted on tele for acute on chronic CHF. Oxygen requirement increased over course of admission due to pulmonary edema , patient started on lasix drip 2.5 ml/hr, pt refused dupont , I&Os not fully documented , daily weight increased from 160 to 186, accuracy is questionable.  Pat started on BiPaP for resp distress and increase work of bleeding      Assessment     - Acute hypoxic respiratory failure 2/2 CHF exacerbation  - Pulmonary edema   - Atrial fibrillation  - LUANNE  - COPD   - HTN  - BPH      Plan     - Transfer to ICU   - Continue BiPaP with O2 supp to maitain sat >88%  - Failed BiPaP and was intubated   - Combivent neb   - Hemodynamic monitoring   - Change lasix gtt to IVP  keep neg fluid balance   - Pat has cough stated he is unable to cough up secretions   - Mucomyst prn   - Diet   - Continue anticoag   - Monitor BP with meds  - Daughter at bedside wish for trial of intubation x 2-3 days

## 2022-03-11 NOTE — AIRWAY PLACEMENT NOTE ADULT - POST AIRWAY PLACEMENT ASSESSMENT:
denture removed/breath sounds bilateral/breath sounds equal/positive end tidal CO2 noted/CXR pending denture removed/breath sounds bilateral/breath sounds equal/positive end tidal CO2 noted/CXR pending/chest excursion noted/skin color improved

## 2022-03-12 NOTE — PROGRESS NOTE ADULT - SUBJECTIVE AND OBJECTIVE BOX
INTERVAL HPI/OVERNIGHT EVENTS: Patient transferred to ICU overnight due to BiPAP requirement.     Antimicrobial:  azithromycin  IVPB      azithromycin  IVPB 500 milliGRAM(s) IV Intermittent every 24 hours  cefTRIAXone   IVPB 1000 milliGRAM(s) IV Intermittent every 24 hours    Cardiovascular:  furosemide   Injectable 60 milliGRAM(s) IV Push every 12 hours  isosorbide   mononitrate ER Tablet (IMDUR) 30 milliGRAM(s) Oral daily  metoprolol tartrate 12.5 milliGRAM(s) Oral every 12 hours  norepinephrine Infusion 0.05 MICROgram(s)/kG/Min IV Continuous <Continuous>  tamsulosin 0.8 milliGRAM(s) Oral at bedtime    Pulmonary:  acetylcysteine 20% for bronchoscopy 4 milliLiter(s) Nebulizer once  ALBUTerol    90 MICROgram(s) HFA Inhaler 2 Puff(s) Inhalation every 6 hours PRN  ALBUTerol    90 MICROgram(s) HFA Inhaler 2 Puff(s) Inhalation every 6 hours PRN  ipratropium 17 MICROgram(s) HFA Inhaler 1 Puff(s) Inhalation every 6 hours    Hematalogic:  apixaban 2.5 milliGRAM(s) Oral two times a day  aspirin  chewable 81 milliGRAM(s) Oral daily    Other:  acetaminophen     Tablet .. 650 milliGRAM(s) Oral every 6 hours PRN  chlorhexidine 2% Cloths 1 Application(s) Topical daily  cholecalciferol 1000 Unit(s) Oral daily  dexMEDEtomidine Infusion 0.5 MICROgram(s)/kG/Hr IV Continuous <Continuous>  famotidine    Tablet 20 milliGRAM(s) Oral daily  fentaNYL   Infusion. 2 MICROgram(s)/kG/Hr IV Continuous <Continuous>  lidocaine   4% Patch 1 Patch Transdermal daily  propofol Infusion 5 MICROgram(s)/kG/Min IV Continuous <Continuous>  simvastatin 10 milliGRAM(s) Oral at bedtime    acetaminophen     Tablet .. 650 milliGRAM(s) Oral every 6 hours PRN  acetylcysteine 20% for bronchoscopy 4 milliLiter(s) Nebulizer once  ALBUTerol    90 MICROgram(s) HFA Inhaler 2 Puff(s) Inhalation every 6 hours PRN  ALBUTerol    90 MICROgram(s) HFA Inhaler 2 Puff(s) Inhalation every 6 hours PRN  apixaban 2.5 milliGRAM(s) Oral two times a day  aspirin  chewable 81 milliGRAM(s) Oral daily  azithromycin  IVPB      azithromycin  IVPB 500 milliGRAM(s) IV Intermittent every 24 hours  cefTRIAXone   IVPB 1000 milliGRAM(s) IV Intermittent every 24 hours  chlorhexidine 2% Cloths 1 Application(s) Topical daily  cholecalciferol 1000 Unit(s) Oral daily  dexMEDEtomidine Infusion 0.5 MICROgram(s)/kG/Hr IV Continuous <Continuous>  famotidine    Tablet 20 milliGRAM(s) Oral daily  fentaNYL   Infusion. 2 MICROgram(s)/kG/Hr IV Continuous <Continuous>  furosemide   Injectable 60 milliGRAM(s) IV Push every 12 hours  ipratropium 17 MICROgram(s) HFA Inhaler 1 Puff(s) Inhalation every 6 hours  isosorbide   mononitrate ER Tablet (IMDUR) 30 milliGRAM(s) Oral daily  lidocaine   4% Patch 1 Patch Transdermal daily  metoprolol tartrate 12.5 milliGRAM(s) Oral every 12 hours  norepinephrine Infusion 0.05 MICROgram(s)/kG/Min IV Continuous <Continuous>  propofol Infusion 5 MICROgram(s)/kG/Min IV Continuous <Continuous>  simvastatin 10 milliGRAM(s) Oral at bedtime  tamsulosin 0.8 milliGRAM(s) Oral at bedtime    Drug Dosing Weight  Height (cm): 177.8 (11 Mar 2022 10:00)  Weight (kg): 71.8 (11 Mar 2022 16:32)  BMI (kg/m2): 22.7 (11 Mar 2022 16:32)  BSA (m2): 1.89 (11 Mar 2022 16:32)    ICU Vital Signs Last 24 Hrs  T(C): 36.1 (11 Mar 2022 20:00), Max: 36.9 (11 Mar 2022 04:28)  T(F): 96.9 (11 Mar 2022 20:00), Max: 98.4 (11 Mar 2022 04:28)  HR: 97 (12 Mar 2022 00:09) (76 - 135)  BP: 90/70 (11 Mar 2022 19:00) (65/41 - 130/66)  BP(mean): 78 (11 Mar 2022 19:00) (46 - 129)  ABP: --  ABP(mean): --  RR: 12 (11 Mar 2022 19:00) (12 - 35)  SpO2: 100% (12 Mar 2022 00:09) (82% - 100%)      ABG - ( 11 Mar 2022 18:09 )  pH, Arterial: 7.51  pH, Blood: x     /  pCO2: 33    /  pO2: 127   / HCO3: 26    / Base Excess: 3.6   /  SaO2: 99                    03-10 @ 07:01  -  03-11 @ 07:00  --------------------------------------------------------  IN: 345 mL / OUT: 2 mL / NET: 343 mL        Mode: AC/ CMV (Assist Control/ Continuous Mandatory Ventilation)  RR (machine): 12  TV (machine): 450  FiO2: 70  PEEP: 5  ITime: 1  MAP: 9  PIP: 8        PHYSICAL EXAM:    General: In respiratory distress  HEENT:  [place]              Lungs: Bilateral crackles , wheezing   Cardiovascular: Regular  Gastrointestinal: Soft, Positive BS  Musculoskeletal: No clubbing.  Moves all extremities.    Skin: Warm.  Intact  Neurological: No motor or sensory deficit     LABS:  CBC Full  -  ( 11 Mar 2022 06:59 )  WBC Count : 9.71 K/uL  RBC Count : 4.80 M/uL  Hemoglobin : 14.9 g/dL  Hematocrit : 44.5 %  Platelet Count - Automated : 207 K/uL  Mean Cell Volume : 92.7 fl  Mean Cell Hemoglobin : 31.0 pg  Mean Cell Hemoglobin Concentration : 33.5 gm/dL  Auto Neutrophil # : 9.22 K/uL  Auto Lymphocyte # : 0.10 K/uL  Auto Monocyte # : 0.19 K/uL  Auto Eosinophil # : 0.00 K/uL  Auto Basophil # : 0.00 K/uL  Auto Neutrophil % : 95.0 %  Auto Lymphocyte % : 1.0 %  Auto Monocyte % : 2.0 %  Auto Eosinophil % : 0.0 %  Auto Basophil % : 0.0 %    -11    136  |  99  |  57<H>  ----------------------------<  98  4.8   |  28  |  2.80<H>    Ca    9.5      11 Mar 2022 15:50  Phos  5.1     03-11  Mg     2.6     03-11        Urinalysis Basic - ( 10 Mar 2022 23:04 )    Color: Yellow / Appearance: Clear / S.010 / pH: x  Gluc: x / Ketone: Negative  / Bili: Negative / Urobili: Negative   Blood: x / Protein: Negative / Nitrite: Negative   Leuk Esterase: Negative / RBC: x / WBC x   Sq Epi: x / Non Sq Epi: x / Bacteria: x          RADIOLOGY & ADDITIONAL STUDIES REVIEWED:  Yes    CRITICAL CARE TIME SPENT: 35 minutes INTERVAL HPI/OVERNIGHT EVENTS: Patient transferred to ICU overnight due to BiPAP requirement.     Antimicrobial:  azithromycin  IVPB      azithromycin  IVPB 500 milliGRAM(s) IV Intermittent every 24 hours  cefTRIAXone   IVPB 1000 milliGRAM(s) IV Intermittent every 24 hours    Cardiovascular:  furosemide   Injectable 60 milliGRAM(s) IV Push every 12 hours  isosorbide   mononitrate ER Tablet (IMDUR) 30 milliGRAM(s) Oral daily  metoprolol tartrate 12.5 milliGRAM(s) Oral every 12 hours  norepinephrine Infusion 0.05 MICROgram(s)/kG/Min IV Continuous <Continuous>  tamsulosin 0.8 milliGRAM(s) Oral at bedtime    Pulmonary:  acetylcysteine 20% for bronchoscopy 4 milliLiter(s) Nebulizer once  ALBUTerol    90 MICROgram(s) HFA Inhaler 2 Puff(s) Inhalation every 6 hours PRN  ALBUTerol    90 MICROgram(s) HFA Inhaler 2 Puff(s) Inhalation every 6 hours PRN  ipratropium 17 MICROgram(s) HFA Inhaler 1 Puff(s) Inhalation every 6 hours    Hematalogic:  apixaban 2.5 milliGRAM(s) Oral two times a day  aspirin  chewable 81 milliGRAM(s) Oral daily    Other:  acetaminophen     Tablet .. 650 milliGRAM(s) Oral every 6 hours PRN  chlorhexidine 2% Cloths 1 Application(s) Topical daily  cholecalciferol 1000 Unit(s) Oral daily  dexMEDEtomidine Infusion 0.5 MICROgram(s)/kG/Hr IV Continuous <Continuous>  famotidine    Tablet 20 milliGRAM(s) Oral daily  fentaNYL   Infusion. 2 MICROgram(s)/kG/Hr IV Continuous <Continuous>  lidocaine   4% Patch 1 Patch Transdermal daily  propofol Infusion 5 MICROgram(s)/kG/Min IV Continuous <Continuous>  simvastatin 10 milliGRAM(s) Oral at bedtime    acetaminophen     Tablet .. 650 milliGRAM(s) Oral every 6 hours PRN  acetylcysteine 20% for bronchoscopy 4 milliLiter(s) Nebulizer once  ALBUTerol    90 MICROgram(s) HFA Inhaler 2 Puff(s) Inhalation every 6 hours PRN  ALBUTerol    90 MICROgram(s) HFA Inhaler 2 Puff(s) Inhalation every 6 hours PRN  apixaban 2.5 milliGRAM(s) Oral two times a day  aspirin  chewable 81 milliGRAM(s) Oral daily  azithromycin  IVPB      azithromycin  IVPB 500 milliGRAM(s) IV Intermittent every 24 hours  cefTRIAXone   IVPB 1000 milliGRAM(s) IV Intermittent every 24 hours  chlorhexidine 2% Cloths 1 Application(s) Topical daily  cholecalciferol 1000 Unit(s) Oral daily  dexMEDEtomidine Infusion 0.5 MICROgram(s)/kG/Hr IV Continuous <Continuous>  famotidine    Tablet 20 milliGRAM(s) Oral daily  fentaNYL   Infusion. 2 MICROgram(s)/kG/Hr IV Continuous <Continuous>  furosemide   Injectable 60 milliGRAM(s) IV Push every 12 hours  ipratropium 17 MICROgram(s) HFA Inhaler 1 Puff(s) Inhalation every 6 hours  isosorbide   mononitrate ER Tablet (IMDUR) 30 milliGRAM(s) Oral daily  lidocaine   4% Patch 1 Patch Transdermal daily  metoprolol tartrate 12.5 milliGRAM(s) Oral every 12 hours  norepinephrine Infusion 0.05 MICROgram(s)/kG/Min IV Continuous <Continuous>  propofol Infusion 5 MICROgram(s)/kG/Min IV Continuous <Continuous>  simvastatin 10 milliGRAM(s) Oral at bedtime  tamsulosin 0.8 milliGRAM(s) Oral at bedtime    Drug Dosing Weight  Height (cm): 177.8 (11 Mar 2022 10:00)  Weight (kg): 71.8 (11 Mar 2022 16:32)  BMI (kg/m2): 22.7 (11 Mar 2022 16:32)  BSA (m2): 1.89 (11 Mar 2022 16:32)    ICU Vital Signs Last 24 Hrs  T(C): 36.1 (11 Mar 2022 20:00), Max: 36.9 (11 Mar 2022 04:28)  T(F): 96.9 (11 Mar 2022 20:00), Max: 98.4 (11 Mar 2022 04:28)  HR: 97 (12 Mar 2022 00:09) (76 - 135)  BP: 90/70 (11 Mar 2022 19:00) (65/41 - 130/66)  BP(mean): 78 (11 Mar 2022 19:00) (46 - 129)  ABP: --  ABP(mean): --  RR: 12 (11 Mar 2022 19:00) (12 - 35)  SpO2: 100% (12 Mar 2022 00:09) (82% - 100%)      ABG - ( 11 Mar 2022 18:09 )  pH, Arterial: 7.51  pH, Blood: x     /  pCO2: 33    /  pO2: 127   / HCO3: 26    / Base Excess: 3.6   /  SaO2: 99                    03-10 @ 07:01  -  03-11 @ 07:00  --------------------------------------------------------  IN: 345 mL / OUT: 2 mL / NET: 343 mL        Mode: AC/ CMV (Assist Control/ Continuous Mandatory Ventilation)  RR (machine): 12  TV (machine): 450  FiO2: 70  PEEP: 5  ITime: 1  MAP: 9  PIP: 8        PHYSICAL EXAM:    General: In respiratory distress  HEENT:  PERRLA + ETT           Lungs: Bilateral crackles , wheezing   Cardiovascular: Regular  Gastrointestinal: Soft, Positive BS  Musculoskeletal: No clubbing.  Moves all extremities.    Skin: Warm.  Intact  Neurological: No motor or sensory deficit     LABS:  CBC Full  -  ( 11 Mar 2022 06:59 )  WBC Count : 9.71 K/uL  RBC Count : 4.80 M/uL  Hemoglobin : 14.9 g/dL  Hematocrit : 44.5 %  Platelet Count - Automated : 207 K/uL  Mean Cell Volume : 92.7 fl  Mean Cell Hemoglobin : 31.0 pg  Mean Cell Hemoglobin Concentration : 33.5 gm/dL  Auto Neutrophil # : 9.22 K/uL  Auto Lymphocyte # : 0.10 K/uL  Auto Monocyte # : 0.19 K/uL  Auto Eosinophil # : 0.00 K/uL  Auto Basophil # : 0.00 K/uL  Auto Neutrophil % : 95.0 %  Auto Lymphocyte % : 1.0 %  Auto Monocyte % : 2.0 %  Auto Eosinophil % : 0.0 %  Auto Basophil % : 0.0 %    03-11    136  |  99  |  57<H>  ----------------------------<  98  4.8   |  28  |  2.80<H>    Ca    9.5      11 Mar 2022 15:50  Phos  5.1     03-11  Mg     2.6     03-11        Urinalysis Basic - ( 10 Mar 2022 23:04 )    Color: Yellow / Appearance: Clear / S.010 / pH: x  Gluc: x / Ketone: Negative  / Bili: Negative / Urobili: Negative   Blood: x / Protein: Negative / Nitrite: Negative   Leuk Esterase: Negative / RBC: x / WBC x   Sq Epi: x / Non Sq Epi: x / Bacteria: x          RADIOLOGY & ADDITIONAL STUDIES REVIEWED:  Yes    CRITICAL CARE TIME SPENT: 35 minutes INTERVAL HPI/OVERNIGHT EVENTS: Patient transferred to ICU overnight due to BiPAP requirement. Pt sedated and intubated, unable to participate in ROS.    Antimicrobial:  azithromycin  IVPB      azithromycin  IVPB 500 milliGRAM(s) IV Intermittent every 24 hours  cefTRIAXone   IVPB 1000 milliGRAM(s) IV Intermittent every 24 hours    Cardiovascular:  furosemide   Injectable 60 milliGRAM(s) IV Push every 12 hours  isosorbide   mononitrate ER Tablet (IMDUR) 30 milliGRAM(s) Oral daily  metoprolol tartrate 12.5 milliGRAM(s) Oral every 12 hours  norepinephrine Infusion 0.05 MICROgram(s)/kG/Min IV Continuous <Continuous>  tamsulosin 0.8 milliGRAM(s) Oral at bedtime    Pulmonary:  acetylcysteine 20% for bronchoscopy 4 milliLiter(s) Nebulizer once  ALBUTerol    90 MICROgram(s) HFA Inhaler 2 Puff(s) Inhalation every 6 hours PRN  ALBUTerol    90 MICROgram(s) HFA Inhaler 2 Puff(s) Inhalation every 6 hours PRN  ipratropium 17 MICROgram(s) HFA Inhaler 1 Puff(s) Inhalation every 6 hours    Hematalogic:  apixaban 2.5 milliGRAM(s) Oral two times a day  aspirin  chewable 81 milliGRAM(s) Oral daily    Other:  acetaminophen     Tablet .. 650 milliGRAM(s) Oral every 6 hours PRN  chlorhexidine 2% Cloths 1 Application(s) Topical daily  cholecalciferol 1000 Unit(s) Oral daily  dexMEDEtomidine Infusion 0.5 MICROgram(s)/kG/Hr IV Continuous <Continuous>  famotidine    Tablet 20 milliGRAM(s) Oral daily  fentaNYL   Infusion. 2 MICROgram(s)/kG/Hr IV Continuous <Continuous>  lidocaine   4% Patch 1 Patch Transdermal daily  propofol Infusion 5 MICROgram(s)/kG/Min IV Continuous <Continuous>  simvastatin 10 milliGRAM(s) Oral at bedtime    acetaminophen     Tablet .. 650 milliGRAM(s) Oral every 6 hours PRN  acetylcysteine 20% for bronchoscopy 4 milliLiter(s) Nebulizer once  ALBUTerol    90 MICROgram(s) HFA Inhaler 2 Puff(s) Inhalation every 6 hours PRN  ALBUTerol    90 MICROgram(s) HFA Inhaler 2 Puff(s) Inhalation every 6 hours PRN  apixaban 2.5 milliGRAM(s) Oral two times a day  aspirin  chewable 81 milliGRAM(s) Oral daily  azithromycin  IVPB      azithromycin  IVPB 500 milliGRAM(s) IV Intermittent every 24 hours  cefTRIAXone   IVPB 1000 milliGRAM(s) IV Intermittent every 24 hours  chlorhexidine 2% Cloths 1 Application(s) Topical daily  cholecalciferol 1000 Unit(s) Oral daily  dexMEDEtomidine Infusion 0.5 MICROgram(s)/kG/Hr IV Continuous <Continuous>  famotidine    Tablet 20 milliGRAM(s) Oral daily  fentaNYL   Infusion. 2 MICROgram(s)/kG/Hr IV Continuous <Continuous>  furosemide   Injectable 60 milliGRAM(s) IV Push every 12 hours  ipratropium 17 MICROgram(s) HFA Inhaler 1 Puff(s) Inhalation every 6 hours  isosorbide   mononitrate ER Tablet (IMDUR) 30 milliGRAM(s) Oral daily  lidocaine   4% Patch 1 Patch Transdermal daily  metoprolol tartrate 12.5 milliGRAM(s) Oral every 12 hours  norepinephrine Infusion 0.05 MICROgram(s)/kG/Min IV Continuous <Continuous>  propofol Infusion 5 MICROgram(s)/kG/Min IV Continuous <Continuous>  simvastatin 10 milliGRAM(s) Oral at bedtime  tamsulosin 0.8 milliGRAM(s) Oral at bedtime    Drug Dosing Weight  Height (cm): 177.8 (11 Mar 2022 10:00)  Weight (kg): 71.8 (11 Mar 2022 16:32)  BMI (kg/m2): 22.7 (11 Mar 2022 16:32)  BSA (m2): 1.89 (11 Mar 2022 16:32)    ICU Vital Signs Last 24 Hrs  T(C): 36.1 (11 Mar 2022 20:00), Max: 36.9 (11 Mar 2022 04:28)  T(F): 96.9 (11 Mar 2022 20:00), Max: 98.4 (11 Mar 2022 04:28)  HR: 97 (12 Mar 2022 00:09) (76 - 135)  BP: 90/70 (11 Mar 2022 19:00) (65/41 - 130/66)  BP(mean): 78 (11 Mar 2022 19:00) (46 - 129)  ABP: --  ABP(mean): --  RR: 12 (11 Mar 2022 19:00) (12 - 35)  SpO2: 100% (12 Mar 2022 00:09) (82% - 100%)      ABG - ( 11 Mar 2022 18:09 )  pH, Arterial: 7.51  pH, Blood: x     /  pCO2: 33    /  pO2: 127   / HCO3: 26    / Base Excess: 3.6   /  SaO2: 99                    03-10 @ 07:01  -  0311 @ 07:00  --------------------------------------------------------  IN: 345 mL / OUT: 2 mL / NET: 343 mL        Mode: AC/ CMV (Assist Control/ Continuous Mandatory Ventilation)  RR (machine): 12  TV (machine): 450  FiO2: 70  PEEP: 5  ITime: 1  MAP: 9  PIP: 8        PHYSICAL EXAM:    General: In respiratory distress  HEENT:  PERRLA + ETT           Lungs: Bilateral crackles , wheezing   Cardiovascular: Regular  Gastrointestinal: Soft, Positive BS  Musculoskeletal: No clubbing.  Moves all extremities.    Skin: Warm.  Intact  Neurological: No motor or sensory deficit     LABS:  CBC Full  -  ( 11 Mar 2022 06:59 )  WBC Count : 9.71 K/uL  RBC Count : 4.80 M/uL  Hemoglobin : 14.9 g/dL  Hematocrit : 44.5 %  Platelet Count - Automated : 207 K/uL  Mean Cell Volume : 92.7 fl  Mean Cell Hemoglobin : 31.0 pg  Mean Cell Hemoglobin Concentration : 33.5 gm/dL  Auto Neutrophil # : 9.22 K/uL  Auto Lymphocyte # : 0.10 K/uL  Auto Monocyte # : 0.19 K/uL  Auto Eosinophil # : 0.00 K/uL  Auto Basophil # : 0.00 K/uL  Auto Neutrophil % : 95.0 %  Auto Lymphocyte % : 1.0 %  Auto Monocyte % : 2.0 %  Auto Eosinophil % : 0.0 %  Auto Basophil % : 0.0 %    11    136  |  99  |  57<H>  ----------------------------<  98  4.8   |  28  |  2.80<H>    Ca    9.5      11 Mar 2022 15:50  Phos  5.1     03-11  Mg     2.6     03-11        Urinalysis Basic - ( 10 Mar 2022 23:04 )    Color: Yellow / Appearance: Clear / S.010 / pH: x  Gluc: x / Ketone: Negative  / Bili: Negative / Urobili: Negative   Blood: x / Protein: Negative / Nitrite: Negative   Leuk Esterase: Negative / RBC: x / WBC x   Sq Epi: x / Non Sq Epi: x / Bacteria: x          RADIOLOGY & ADDITIONAL STUDIES REVIEWED:  Yes    CRITICAL CARE TIME SPENT: 35 minutes

## 2022-03-12 NOTE — PROGRESS NOTE ADULT - ASSESSMENT
89 years old male with history of HTN, BPH,latent TB and COPD, CHF who was brought to ED due to shortness of breath that started 2 weeks ago. Patient admitted on tele for acute on chronic CHF. Oxygen requirement increased over course of admission due to pulmonary edema , patient started on lasix drip 2.5 ml/hr, pt refused dupont , I&Os not fully documented , daily weight increased from 160 to 186, accuracy is questionable.  I was paged by nurse that patient is saturating in 80s on 6 L NC , placed on NRB 15 liters with improvement in saturation to 98% , patient uses accessory muscles for breathing, MS at baseline , repeat CXR still congested with mild improvement in right upper lung , ABG - ( 11 Mar 2022 06:18 ) pH, Arterial: 7.41 /  pCO2: 41  /  pO2: 122 / HCO3: 26  / Base Excess: 1.2  /  SaO2: 99,  bed side Lung Pocus showed predominant  diffuse B lines in b/l anterior lung fields, Bilateral pleural effusion R>L , A flap of floating atelectatic lung seen in right pleural effusion. IVC plum however compressible,  could not measure maximum diameter due to patients constant coughing , Lasix drip rate increased to 5ml/hour , patient placed on BI-PAP. Bladder scan showed 210cc retaining, pt endorses difficulty urinating, however refused Dupont. Pt transferred to ICU requiring new Bi-Pap.     Assessment and plan :    #Acute hypoxic respiratory failure 2/2 CHF exacerbation  #Atrial fibrillation  #LUANNE  #COPD  #HTN  #BPH    Neuro  AAOX3, no issues     Cardio   #Acute hypoxic respiratory failure 2/2 CHF exacerbation  -p/w SOB  - c/w lasix drip at 5ml/hour for now   - Pt was hypoxic and tachypneic on NRB, was put on BiPAP for increased work of breathing   -Echo showed grossly severe global left ventricular systolic dysfunction, and b/l pleural effusions  - ABG 7.41/41/122/26 on 10 l NRB  -CXR shows worsening b/l pleural effusion   - Strict I&Os   - daily weight    #Afib  -c/w eliquis and metoprolol     #HTN  -c/w imdur and metoprolol     Pulm   #Acute hypoxic respiratory failure 2/2 CHF exacerbation  -c/w Lasix drip  -Placed on BiPAP for increased work of breathing    #COPD  -c/w Albuterol inhaler prn    GI   No active issues    Renal  #LUANNE  -Cr 2.63  -Baseline Cr is normal  -Likely pre-renal LUANNE due to CHF  -c/w lasix  -Monitor BUN/Cr     Endo  No active issues    ID  No active issues    Prophylaxis  Eliquis for DVT ppx    GOC  DNR, Trial of intubation   89 years old male with history of HTN, BPH,latent TB and COPD, CHF who was brought to ED due to shortness of breath that started 2 weeks ago. Patient admitted on tele for acute on chronic CHF. Oxygen requirement increased over course of admission due to pulmonary edema , patient started on lasix drip 2.5 ml/hr, pt refused dupont , I&Os not fully documented , daily weight increased from 160 to 186, accuracy is questionable.  I was paged by nurse that patient is saturating in 80s on 6 L NC , placed on NRB 15 liters with improvement in saturation to 98% , patient uses accessory muscles for breathing, MS at baseline , repeat CXR still congested with mild improvement in right upper lung , ABG - ( 11 Mar 2022 06:18 ) pH, Arterial: 7.41 /  pCO2: 41  /  pO2: 122 / HCO3: 26  / Base Excess: 1.2  /  SaO2: 99,  bed side Lung Pocus showed predominant  diffuse B lines in b/l anterior lung fields, Bilateral pleural effusion R>L , A flap of floating atelectatic lung seen in right pleural effusion. IVC plum however compressible,  could not measure maximum diameter due to patients constant coughing , Lasix drip rate increased to 5ml/hour , patient placed on BI-PAP. Bladder scan showed 210cc retaining, pt endorses difficulty urinating, however refused Dupont. Pt transferred to ICU requiring new Bi-Pap.     Assessment and plan :    #Acute hypoxic respiratory failure 2/2 CHF exacerbation  #Atrial fibrillation  #LUANNE  #COPD  #HTN  #BPH    Neuro  AAOX3, no issues     Cardio   #Acute hypoxic respiratory failure 2/2 CHF exacerbation  -p/w SOB  - Pt was hypoxic and tachypneic on NRB, was put on BiPAP for increased work of breathing   - Echo showed grossly severe global left ventricular systolic dysfunction, and b/l pleural effusions  - CXR shows worsening b/l pleural effusion   - cont lasix  - Strict I&Os   - daily weight    #Afib  -c/w eliquis and metoprolol     #HTN  -c/w imdur and metoprolol     Pulm   #Acute hypoxic respiratory failure 2/2 CHF exacerbation  - cont lasix  - intubated and sedated    #COPD  -c/w Albuterol inhaler prn    GI   No active issues    Renal  #LUANNE  -Baseline Cr is normal  -Likely pre-renal LUANNE due to CHF  -c/w lasix  -Monitor BUN/Cr   - sodium bicarb drip    Endo  No active issues    ID  No active issues    Prophylaxis  Eliquis for DVT ppx    GOC  DNR, Trial of intubation

## 2022-03-12 NOTE — DIETITIAN INITIAL EVALUATION ADULT. - PERTINENT MEDS FT
MEDICATIONS  (STANDING):  acetylcysteine 20% for bronchoscopy 4 milliLiter(s) Nebulizer once  apixaban 2.5 milliGRAM(s) Oral two times a day  aspirin  chewable 81 milliGRAM(s) Oral daily  azithromycin  IVPB      azithromycin  IVPB 500 milliGRAM(s) IV Intermittent every 24 hours  cefTRIAXone   IVPB 1000 milliGRAM(s) IV Intermittent every 24 hours  chlorhexidine 2% Cloths 1 Application(s) Topical daily  cholecalciferol 1000 Unit(s) Oral daily  dexMEDEtomidine Infusion 0.5 MICROgram(s)/kG/Hr (8.98 mL/Hr) IV Continuous <Continuous>  famotidine    Tablet 20 milliGRAM(s) Oral daily  fentaNYL   Infusion. 2 MICROgram(s)/kG/Hr (14.4 mL/Hr) IV Continuous <Continuous>  furosemide   Injectable 60 milliGRAM(s) IV Push every 12 hours  ipratropium 17 MICROgram(s) HFA Inhaler 1 Puff(s) Inhalation every 6 hours  norepinephrine Infusion 0.05 MICROgram(s)/kG/Min (6.73 mL/Hr) IV Continuous <Continuous>  propofol Infusion 5 MICROgram(s)/kG/Min (2.15 mL/Hr) IV Continuous <Continuous>  simvastatin 10 milliGRAM(s) Oral at bedtime  tamsulosin 0.8 milliGRAM(s) Oral at bedtime    MEDICATIONS  (PRN):  acetaminophen     Tablet .. 650 milliGRAM(s) Oral every 6 hours PRN Moderate Pain (4 - 6)  ALBUTerol    90 MICROgram(s) HFA Inhaler 2 Puff(s) Inhalation every 6 hours PRN Shortness of Breath  ALBUTerol    90 MICROgram(s) HFA Inhaler 2 Puff(s) Inhalation every 6 hours PRN Shortness of Breath and/or Wheezing

## 2022-03-12 NOTE — DIETITIAN INITIAL EVALUATION ADULT. - PERTINENT LABORATORY DATA
03-12 Na136 mmol/L Glu 79 mg/dL K+ 5.0 mmol/L Cr  2.89 mg/dL<H> BUN 69 mg/dL<H>   03-12 Phos 4.1 mg/dL   03-12 Alb 2.9 g/dL<L>     03-08 Chol 103 mg/dL LDL --    HDL 53 mg/dL Trig 91 mg/dL    03-09-22 @ 09:45 HgbA1C 6.0

## 2022-03-12 NOTE — DIETITIAN INITIAL EVALUATION ADULT. - FACTORS AFF FOOD INTAKE
weakness, SOB, acute exacerbation of CHF, COPD, HTN, latent TB/change in mental status/difficulty with food procurement/preparation/other (specify)/NPO

## 2022-03-12 NOTE — PROGRESS NOTE ADULT - SUBJECTIVE AND OBJECTIVE BOX
PATIENT SEEN AND EXAMINED ON :- 3/12/2022  DATE OF SERVICE:    3/12/2022         Interim events noted,Labs ,Radiological studies and Cardiology tests reviewed .       HOSPITAL COURSE: HPI:  Patient is 89 years old male with past medical history of past medical history of HTN, BPH,latent TB and COPD, CHF who was brought to ED due to shortness of breath that started 2 weeks ago. Patient reports that for the last two weeks, His shortness of breath was getting worse. Patient reports that he wasn't aware that he has heart problem. On physical exam, Patient has diffuse crackles throughout lungs and shortness of breathing when lying down. Patient is saturating 96% on 3L NC in ED. Patient denied chest pain, abdominal pain, N/V or shortness of breath.  (08 Mar 2022 03:29)      INTERIM EVENTS:Patient seen at bedside ,interim events noted.      PMH -reviewed admission note, no change since admission  HEART FAILURE: Acute[ ]Chronic[ ] Systolic[ ] Diastolic[ ] Combined Systolic and Diastolic[ ]  CAD[ ] CABG[ ] PCI[ ]  DEVICES[ ] PPM[ ] ICD[ ] ILR[ ]  ATRIAL FIBRILLATION[ ] Paroxysmal[ ] Permanent[ ]  LUANNE[ ] CKD1[ ] CKD2[ ] CKD3[ ] CKD4[ ] ESRD[ ]  COPD[ ] HTN[ ]   DM[ ] Type1[ ] Type 2[ ]   CVA[ ] Paresis[ ]    AMBULATION: Assisted[ ] Cane/walker[ ] Independent[ ]    MEDICATIONS  (STANDING):  acetylcysteine 20% for bronchoscopy 4 milliLiter(s) Nebulizer once  apixaban 2.5 milliGRAM(s) Oral two times a day  aspirin  chewable 81 milliGRAM(s) Oral daily  azithromycin  IVPB      azithromycin  IVPB 500 milliGRAM(s) IV Intermittent every 24 hours  cefTRIAXone   IVPB 1000 milliGRAM(s) IV Intermittent every 24 hours  chlorhexidine 2% Cloths 1 Application(s) Topical daily  cholecalciferol 1000 Unit(s) Oral daily  dexMEDEtomidine Infusion 0.5 MICROgram(s)/kG/Hr (8.98 mL/Hr) IV Continuous <Continuous>  famotidine    Tablet 20 milliGRAM(s) Oral daily  fentaNYL   Infusion. 2 MICROgram(s)/kG/Hr (14.4 mL/Hr) IV Continuous <Continuous>  furosemide   Injectable 60 milliGRAM(s) IV Push every 12 hours  ipratropium 17 MICROgram(s) HFA Inhaler 1 Puff(s) Inhalation every 6 hours  norepinephrine Infusion 0.05 MICROgram(s)/kG/Min (6.73 mL/Hr) IV Continuous <Continuous>  propofol Infusion 5 MICROgram(s)/kG/Min (2.15 mL/Hr) IV Continuous <Continuous>  simvastatin 10 milliGRAM(s) Oral at bedtime  tamsulosin 0.8 milliGRAM(s) Oral at bedtime    MEDICATIONS  (PRN):  acetaminophen     Tablet .. 650 milliGRAM(s) Oral every 6 hours PRN Moderate Pain (4 - 6)  ALBUTerol    90 MICROgram(s) HFA Inhaler 2 Puff(s) Inhalation every 6 hours PRN Shortness of Breath  ALBUTerol    90 MICROgram(s) HFA Inhaler 2 Puff(s) Inhalation every 6 hours PRN Shortness of Breath and/or Wheezing            REVIEW OF SYSTEMS:  Constitutional: [ ] fever, [ ]weight loss,  [ ]fatigue  Eyes: [ ] visual changes  Respiratory: [ ]shortness of breath;  [ ] cough, [ ]wheezing, [ ]chills, [ ]hemoptysis  Cardiovascular: [ ] chest pain, [ ]palpitations, [ ]dizziness,  [ ]leg swelling[ ]orthopnea[ ]PND  Gastrointestinal: [ ] abdominal pain, [ ]nausea, [ ]vomiting,  [ ]diarrhea [ ]Constipation [ ]Melena  Genitourinary: [ ] dysuria, [ ] hematuria [ ]Bucio  Neurologic: [ ] headaches [ ] tremors[ ]weakness [ ]Paralysis Right[ ] Left[ ]  Skin: [ ] itching, [ ]burning, [ ] rashes  Endocrine: [ ] heat or cold intolerance  Musculoskeletal: [ ] joint pain or swelling; [ ] muscle, back, or extremity pain  Psychiatric: [ ] depression, [ ]anxiety, [ ]mood swings, or [ ]difficulty sleeping  Hematologic: [ ] easy bruising, [ ] bleeding gums    [ ] All remaining systems negative except as per above.   [ ]Unable to obtain.  [x] No change in ROS since admission      Vital Signs Last 24 Hrs  T(C): 35.7 (12 Mar 2022 16:45), Max: 37.2 (12 Mar 2022 07:15)  T(F): 96.3 (12 Mar 2022 16:45), Max: 99 (12 Mar 2022 07:15)  HR: 86 (12 Mar 2022 19:00) (78 - 116)  BP: 92/55 (12 Mar 2022 19:00) (73/54 - 186/167)  BP(mean): 65 (12 Mar 2022 19:00) (61 - 175)  RR: 12 (12 Mar 2022 19:00) (12 - 28)  SpO2: 100% (12 Mar 2022 19:00) (83% - 100%)  I&O's Summary    11 Mar 2022 07:01  -  12 Mar 2022 07:00  --------------------------------------------------------  IN: 453.7 mL / OUT: 90 mL / NET: 363.7 mL    12 Mar 2022 06:01  -  12 Mar 2022 20:45  --------------------------------------------------------  IN: 680.6 mL / OUT: 700 mL / NET: -19.4 mL        PHYSICAL EXAM:  General: No acute distress BMI-  HEENT: EOMI, PERRL  Neck: Supple, [ ] JVD  Lungs: Equal air entry bilaterally; [ ] rales [ ] wheezing [ ] rhonchi  Heart: Regular rate and rhythm; [x ] murmur   2/6 [ x] systolic [ ] diastolic [ ] radiation[ ] rubs [ ]  gallops  Abdomen: Nontender, bowel sounds present  Extremities: No clubbing, cyanosis, [ ] edema [ ]Pulses  equal and intact  Nervous system:  Alert & Oriented X3, no focal deficits  Psychiatric: Normal affect  Skin: No rashes or lesions    LABS:  03-12    136  |  101  |  69<H>  ----------------------------<  79  5.0   |  26  |  2.89<H>    Ca    9.5      12 Mar 2022 04:44  Phos  4.1     03-12  Mg     2.4     03-12    TPro  6.1  /  Alb  2.9<L>  /  TBili  2.5<H>  /  DBili  x   /  AST  489<H>  /  ALT  448<H>  /  AlkPhos  91  03-12    Creatinine Trend: 2.89<--, 2.80<--, 2.63<--, 2.24<--, 2.25<--, 2.16<--                        15.4   14.36 )-----------( 220      ( 12 Mar 2022 04:44 )             44.8         < from: Transthoracic Echocardiogram (03.09.22 @ 07:02) >  CONCLUSIONS:  1. Mild to moderate mitral regurgitation.  2.  Mild aortic regurgitation.  3. Moderate left ventricular enlargement.  4. Endocardium not well visualized; grossly severe global  left ventricular systolic dysfunction.  5. Right ventricular enlargement with decreased RV systolic  function.  (TAPSE 1.0 cm)  6. Small pericardial effusion.   No echocardiographic  evidence of tamponade physiology.  7. Bilateral pleural effusions.    < end of copied text >

## 2022-03-12 NOTE — DIETITIAN INITIAL EVALUATION ADULT. - ENTERAL
If tube feeding initiated, rec. tube feeding; Jevity 1.5 Kcal, goal at 40ml/hr x 24hrs (960ml, 1440kcal, 61g protein, 729ml water daily at goal)  MD to adjust free water as needed as medically feasible

## 2022-03-12 NOTE — DIETITIAN INITIAL EVALUATION ADULT. - OTHER INFO
Patient from [facility] admitted for shortness of breath that started 2 weeks ago, transferred from tele floor to ICU for BiPAP requirement on 03/11. Visited pt. debilitated, presently NPO with IV fluids, intubated & placed on vent (3/11), propofol infusing at 4.3 ml (03/12) noted, generalized edema, skin intact.

## 2022-03-12 NOTE — PROGRESS NOTE ADULT - ASSESSMENT
89 years old male with history of HTN, BPH,latent TB and COPD, CHF who was brought to ED due to shortness of breath that started 2 weeks ago. Patient admitted on tele for acute on chronic CHF. Oxygen requirement increased over course of admission due to pulmonary edema , patient started on lasix drip 2.5 ml/hr, pt refused dupont , I&Os not fully documented , daily weight increased from 160 to 186, accuracy is questionable.  I was paged by nurse that patient is saturating in 80s on 6 L NC , placed on NRB 15 liters with improvement in saturation to 98% , patient uses accessory muscles for breathing, MS at baseline , repeat CXR still congested with mild improvement in right upper lung , ABG - ( 11 Mar 2022 06:18 ) pH, Arterial: 7.41 /  pCO2: 41  /  pO2: 122 / HCO3: 26  / Base Excess: 1.2  /  SaO2: 99,  bed side Lung Pocus showed predominant  diffuse B lines in b/l anterior lung fields, Bilateral pleural effusion R>L , A flap of floating atelectatic lung seen in right pleural effusion. IVC plum however compressible,  could not measure maximum diameter due to patients constant coughing , Lasix drip rate increased to 5ml/hour , patient placed on BI-PAP. Bladder scan showed 210cc retaining, pt endorses difficulty urinating, however refused Dupont. Pt transferred to ICU requiring new Bi-Pap.     Assessment and plan :    #Acute hypoxic respiratory failure 2/2 CHF exacerbation  #Atrial fibrillation  #LUANNE  #COPD  #HTN  #BPH    Neuro  AAOX3, no issues     Cardio   #Acute hypoxic respiratory failure 2/2 CHF exacerbation  -p/w SOB  - Pt was hypoxic and tachypneic on NRB, was put on BiPAP for increased work of breathing   - Echo showed grossly severe global left ventricular systolic dysfunction, and b/l pleural effusions  - CXR shows worsening b/l pleural effusion   - cont lasix  - Strict I&Os   - daily weight    #Afib  -c/w eliquis and metoprolol     #HTN  -c/w imdur and metoprolol     Pulm   #Acute hypoxic respiratory failure 2/2 CHF exacerbation  - cont lasix  - intubated and sedated    #COPD  -c/w Albuterol inhaler prn    GI   No active issues    Renal  #LUANNE  -Baseline Cr is normal  -Likely pre-renal LUANEN due to CHF  -c/w lasix  -Monitor BUN/Cr   - sodium bicarb drip    Endo  No active issues    ID  No active issues    Prophylaxis  Eliquis for DVT ppx    GOC  DNR, Trial of intubation

## 2022-03-13 NOTE — PROGRESS NOTE ADULT - SUBJECTIVE AND OBJECTIVE BOX
PATIENT SEEN AND EXAMINED ON :- 3/13/2022  DATE OF SERVICE:    3/13/2022         Interim events noted,Labs ,Radiological studies and Cardiology tests reviewed .       HOSPITAL COURSE: HPI:  Patient is 89 years old male with past medical history of past medical history of HTN, BPH,latent TB and COPD, CHF who was brought to ED due to shortness of breath that started 2 weeks ago. Patient reports that for the last two weeks, His shortness of breath was getting worse. Patient reports that he wasn't aware that he has heart problem. On physical exam, Patient has diffuse crackles throughout lungs and shortness of breathing when lying down. Patient is saturating 96% on 3L NC in ED. Patient denied chest pain, abdominal pain, N/V or shortness of breath.  (08 Mar 2022 03:29)      INTERIM EVENTS:Patient seen at bedside ,interim events noted.      PMH -reviewed admission note, no change since admission  HEART FAILURE: Acute[ ]Chronic[ ] Systolic[ ] Diastolic[ ] Combined Systolic and Diastolic[ ]  CAD[ ] CABG[ ] PCI[ ]  DEVICES[ ] PPM[ ] ICD[ ] ILR[ ]  ATRIAL FIBRILLATION[ ] Paroxysmal[ ] Permanent[ ]  LUANNE[ ] CKD1[ ] CKD2[ ] CKD3[ ] CKD4[ ] ESRD[ ]  COPD[ ] HTN[ ]   DM[ ] Type1[ ] Type 2[ ]   CVA[ ] Paresis[ ]    AMBULATION: Assisted[ ] Cane/walker[ ] Independent[ ]    MEDICATIONS  (STANDING):  acetylcysteine 20% for bronchoscopy 4 milliLiter(s) Nebulizer once  aspirin  chewable 81 milliGRAM(s) Oral daily  azithromycin  IVPB      azithromycin  IVPB 500 milliGRAM(s) IV Intermittent every 24 hours  cefTRIAXone   IVPB 1000 milliGRAM(s) IV Intermittent every 24 hours  chlorhexidine 0.12% Liquid 15 milliLiter(s) Oral Mucosa every 12 hours  chlorhexidine 2% Cloths 1 Application(s) Topical daily  cholecalciferol 1000 Unit(s) Oral daily  dexMEDEtomidine Infusion 0.5 MICROgram(s)/kG/Hr (8.98 mL/Hr) IV Continuous <Continuous>  famotidine    Tablet 20 milliGRAM(s) Oral daily  fentaNYL   Infusion. 2 MICROgram(s)/kG/Hr (14.4 mL/Hr) IV Continuous <Continuous>  furosemide   Injectable 60 milliGRAM(s) IV Push every 12 hours  heparin  Infusion.  Unit(s)/Hr (13 mL/Hr) IV Continuous <Continuous>  ipratropium 17 MICROgram(s) HFA Inhaler 1 Puff(s) Inhalation every 6 hours  norepinephrine Infusion 0.05 MICROgram(s)/kG/Min (6.73 mL/Hr) IV Continuous <Continuous>  propofol Infusion 5 MICROgram(s)/kG/Min (2.15 mL/Hr) IV Continuous <Continuous>  simvastatin 10 milliGRAM(s) Oral at bedtime  tamsulosin 0.8 milliGRAM(s) Oral at bedtime    MEDICATIONS  (PRN):  acetaminophen     Tablet .. 650 milliGRAM(s) Oral every 6 hours PRN Moderate Pain (4 - 6)  ALBUTerol    90 MICROgram(s) HFA Inhaler 2 Puff(s) Inhalation every 6 hours PRN Shortness of Breath  ALBUTerol    90 MICROgram(s) HFA Inhaler 2 Puff(s) Inhalation every 6 hours PRN Shortness of Breath and/or Wheezing            REVIEW OF SYSTEMS:  Constitutional: [ ] fever, [ ]weight loss,  [ ]fatigue  Eyes: [ ] visual changes  Respiratory: [ ]shortness of breath;  [ ] cough, [ ]wheezing, [ ]chills, [ ]hemoptysis  Cardiovascular: [ ] chest pain, [ ]palpitations, [ ]dizziness,  [ ]leg swelling[ ]orthopnea[ ]PND  Gastrointestinal: [ ] abdominal pain, [ ]nausea, [ ]vomiting,  [ ]diarrhea [ ]Constipation [ ]Melena  Genitourinary: [ ] dysuria, [ ] hematuria [ ]Bucio  Neurologic: [ ] headaches [ ] tremors[ ]weakness [ ]Paralysis Right[ ] Left[ ]  Skin: [ ] itching, [ ]burning, [ ] rashes  Endocrine: [ ] heat or cold intolerance  Musculoskeletal: [ ] joint pain or swelling; [ ] muscle, back, or extremity pain  Psychiatric: [ ] depression, [ ]anxiety, [ ]mood swings, or [ ]difficulty sleeping  Hematologic: [ ] easy bruising, [ ] bleeding gums    [ ] All remaining systems negative except as per above.   [ ]Unable to obtain.  [x] No change in ROS since admission      Vital Signs Last 24 Hrs  T(C): 36.3 (13 Mar 2022 20:00), Max: 36.4 (13 Mar 2022 00:21)  T(F): 97.3 (13 Mar 2022 20:00), Max: 97.5 (13 Mar 2022 00:21)  HR: 109 (13 Mar 2022 20:53) (95 - 127)  BP: 80/64 (13 Mar 2022 20:00) (68/57 - 159/119)  BP(mean): 71 (13 Mar 2022 20:00) (58 - 130)  RR: 12 (13 Mar 2022 20:53) (12 - 50)  SpO2: 98% (13 Mar 2022 20:53) (91% - 99%)  I&O's Summary    12 Mar 2022 06:01  -  13 Mar 2022 07:00  --------------------------------------------------------  IN: 961.6 mL / OUT: 1420 mL / NET: -458.4 mL    13 Mar 2022 07:01  -  13 Mar 2022 20:58  --------------------------------------------------------  IN: 1026.3 mL / OUT: 320 mL / NET: 706.3 mL        PHYSICAL EXAM:  General: No acute distress BMI-  HEENT: EOMI, PERRL  Neck: Supple, [ ] JVD  Lungs: Equal air entry bilaterally; [ ] rales [ ] wheezing [ ] rhonchi  Heart: Regular rate and rhythm; [x ] murmur   2/6 [ x] systolic [ ] diastolic [ ] radiation[ ] rubs [ ]  gallops  Abdomen: Nontender, bowel sounds present  Extremities: No clubbing, cyanosis, [ ] edema [ ]Pulses  equal and intact  Nervous system:  Alert & Oriented X3, no focal deficits  Psychiatric: Normal affect  Skin: No rashes or lesions    LABS:  03-13    139  |  101  |  70<H>  ----------------------------<  63<L>  4.5   |  27  |  2.62<H>    Ca    9.0      13 Mar 2022 04:15  Phos  3.9     03-13  Mg     2.3     03-13    TPro  5.7<L>  /  Alb  2.5<L>  /  TBili  2.2<H>  /  DBili  x   /  AST  205<H>  /  ALT  327<H>  /  AlkPhos  94  03-13    Creatinine Trend: 2.62<--, 2.89<--, 2.80<--, 2.63<--, 2.24<--, 2.25<--                        14.1   11.65 )-----------( 174      ( 13 Mar 2022 20:15 )             42.2     PTT - ( 13 Mar 2022 13:56 )  PTT:45.0 sec

## 2022-03-13 NOTE — PROGRESS NOTE ADULT - SUBJECTIVE AND OBJECTIVE BOX
INTERVAL HPI/OVERNIGHT EVENTS:  No acute events overnight. Pt remains intubated.    PRESSORS: [ ] YES [ ] NO  WHICH:    ANTIBIOTICS:                      Antimicrobial:  azithromycin  IVPB      azithromycin  IVPB 500 milliGRAM(s) IV Intermittent every 24 hours  cefTRIAXone   IVPB 1000 milliGRAM(s) IV Intermittent every 24 hours    Cardiovascular:  furosemide   Injectable 60 milliGRAM(s) IV Push every 12 hours  norepinephrine Infusion 0.05 MICROgram(s)/kG/Min IV Continuous <Continuous>  tamsulosin 0.8 milliGRAM(s) Oral at bedtime    Pulmonary:  acetylcysteine 20% for bronchoscopy 4 milliLiter(s) Nebulizer once  ALBUTerol    90 MICROgram(s) HFA Inhaler 2 Puff(s) Inhalation every 6 hours PRN  ALBUTerol    90 MICROgram(s) HFA Inhaler 2 Puff(s) Inhalation every 6 hours PRN  ipratropium 17 MICROgram(s) HFA Inhaler 1 Puff(s) Inhalation every 6 hours    Hematalogic:  apixaban 2.5 milliGRAM(s) Oral two times a day  aspirin  chewable 81 milliGRAM(s) Oral daily    Other:  acetaminophen     Tablet .. 650 milliGRAM(s) Oral every 6 hours PRN  chlorhexidine 0.12% Liquid 15 milliLiter(s) Oral Mucosa every 12 hours  chlorhexidine 2% Cloths 1 Application(s) Topical daily  cholecalciferol 1000 Unit(s) Oral daily  dexMEDEtomidine Infusion 0.5 MICROgram(s)/kG/Hr IV Continuous <Continuous>  famotidine    Tablet 20 milliGRAM(s) Oral daily  fentaNYL   Infusion. 2 MICROgram(s)/kG/Hr IV Continuous <Continuous>  propofol Infusion 5 MICROgram(s)/kG/Min IV Continuous <Continuous>  simvastatin 10 milliGRAM(s) Oral at bedtime    acetaminophen     Tablet .. 650 milliGRAM(s) Oral every 6 hours PRN  acetylcysteine 20% for bronchoscopy 4 milliLiter(s) Nebulizer once  ALBUTerol    90 MICROgram(s) HFA Inhaler 2 Puff(s) Inhalation every 6 hours PRN  ALBUTerol    90 MICROgram(s) HFA Inhaler 2 Puff(s) Inhalation every 6 hours PRN  apixaban 2.5 milliGRAM(s) Oral two times a day  aspirin  chewable 81 milliGRAM(s) Oral daily  azithromycin  IVPB      azithromycin  IVPB 500 milliGRAM(s) IV Intermittent every 24 hours  cefTRIAXone   IVPB 1000 milliGRAM(s) IV Intermittent every 24 hours  chlorhexidine 0.12% Liquid 15 milliLiter(s) Oral Mucosa every 12 hours  chlorhexidine 2% Cloths 1 Application(s) Topical daily  cholecalciferol 1000 Unit(s) Oral daily  dexMEDEtomidine Infusion 0.5 MICROgram(s)/kG/Hr IV Continuous <Continuous>  famotidine    Tablet 20 milliGRAM(s) Oral daily  fentaNYL   Infusion. 2 MICROgram(s)/kG/Hr IV Continuous <Continuous>  furosemide   Injectable 60 milliGRAM(s) IV Push every 12 hours  ipratropium 17 MICROgram(s) HFA Inhaler 1 Puff(s) Inhalation every 6 hours  norepinephrine Infusion 0.05 MICROgram(s)/kG/Min IV Continuous <Continuous>  propofol Infusion 5 MICROgram(s)/kG/Min IV Continuous <Continuous>  simvastatin 10 milliGRAM(s) Oral at bedtime  tamsulosin 0.8 milliGRAM(s) Oral at bedtime    Drug Dosing Weight  Height (cm): 177.8 (11 Mar 2022 10:00)  Weight (kg): 71.8 (11 Mar 2022 16:32)  BMI (kg/m2): 22.7 (11 Mar 2022 16:32)  BSA (m2): 1.89 (11 Mar 2022 16:32)    CENTRAL LINE: [ ] YES [ ] NO  LOCATION:   DATE INSERTED:  REMOVE: [ ] YES [ ] NO  EXPLAIN:    LOVELACE: [ ] YES [ ] NO    DATE INSERTED:  REMOVE:  [ ] YES [ ] NO  EXPLAIN:    A-LINE:  [ ] YES [ ] NO  LOCATION:   DATE INSERTED:  REMOVE:  [ ] YES [ ] NO  EXPLAIN:    PMH -reviewed admission note, no change since admission    ICU Vital Signs Last 24 Hrs  T(C): 35.7 (12 Mar 2022 16:45), Max: 37.2 (12 Mar 2022 07:15)  T(F): 96.3 (12 Mar 2022 16:45), Max: 99 (12 Mar 2022 07:15)  HR: 87 (12 Mar 2022 20:15) (78 - 115)  BP: 88/61 (12 Mar 2022 20:15) (73/54 - 186/167)  BP(mean): 71 (12 Mar 2022 20:15) (61 - 175)  ABP: --  ABP(mean): --  RR: 12 (12 Mar 2022 20:15) (12 - 28)  SpO2: 99% (12 Mar 2022 20:15) (83% - 100%)      ABG - ( 11 Mar 2022 18:09 )  pH, Arterial: 7.51  pH, Blood: x     /  pCO2: 33    /  pO2: 127   / HCO3: 26    / Base Excess: 3.6   /  SaO2: 99                    03-11 @ 07:01  -  03-12 @ 07:00  --------------------------------------------------------  IN: 453.7 mL / OUT: 90 mL / NET: 363.7 mL        Mode: AC/ CMV (Assist Control/ Continuous Mandatory Ventilation)  RR (machine): 12  TV (machine): 450  FiO2: 60  PEEP: 5  ITime: 1  MAP: 8  PIP: 24      PHYSICAL EXAM:    General: intubated, sedated  HEENT:  PERRLA + ETT           Lungs: Bilateral crackles  Cardiovascular: Regular, (+) +2 systolic murmur  Gastrointestinal: Soft, Positive BS  Musculoskeletal: No clubbing.  Moves all extremities.    Skin: Warm.  Intact  Neurological: No motor or sensory deficit     LABS:  CBC Full  -  ( 12 Mar 2022 04:44 )  WBC Count : 14.36 K/uL  RBC Count : 4.99 M/uL  Hemoglobin : 15.4 g/dL  Hematocrit : 44.8 %  Platelet Count - Automated : 220 K/uL  Mean Cell Volume : 89.8 fl  Mean Cell Hemoglobin : 30.9 pg  Mean Cell Hemoglobin Concentration : 34.4 gm/dL  Auto Neutrophil # : 13.00 K/uL  Auto Lymphocyte # : 0.50 K/uL  Auto Monocyte # : 0.78 K/uL  Auto Eosinophil # : 0.00 K/uL  Auto Basophil # : 0.01 K/uL  Auto Neutrophil % : 90.5 %  Auto Lymphocyte % : 3.5 %  Auto Monocyte % : 5.4 %  Auto Eosinophil % : 0.0 %  Auto Basophil % : 0.1 %    03-12    136  |  101  |  69<H>  ----------------------------<  79  5.0   |  26  |  2.89<H>    Ca    9.5      12 Mar 2022 04:44  Phos  4.1     03-12  Mg     2.4     03-12    TPro  6.1  /  Alb  2.9<L>  /  TBili  2.5<H>  /  DBili  x   /  AST  489<H>  /  ALT  448<H>  /  AlkPhos  91  03-12        Culture Results:   Numerous Serratia marcescens (03-11 @ 21:15)      RADIOLOGY & ADDITIONAL STUDIES REVIEWED:  ***    GOALS OF CARE DISCUSSION WITH PATIENT/FAMILY/PROXY:    CRITICAL CARE TIME SPENT: 35 minutes INTERVAL HPI/OVERNIGHT EVENTS:  No acute events overnight. Pt remains intubated.    PRESSORS: [X ] YES [ ] NO  WHICH: levo    ANTIBIOTICS:                      Antimicrobial:  azithromycin  IVPB      azithromycin  IVPB 500 milliGRAM(s) IV Intermittent every 24 hours  cefTRIAXone   IVPB 1000 milliGRAM(s) IV Intermittent every 24 hours    Cardiovascular:  furosemide   Injectable 60 milliGRAM(s) IV Push every 12 hours  norepinephrine Infusion 0.05 MICROgram(s)/kG/Min IV Continuous <Continuous>  tamsulosin 0.8 milliGRAM(s) Oral at bedtime    Pulmonary:  acetylcysteine 20% for bronchoscopy 4 milliLiter(s) Nebulizer once  ALBUTerol    90 MICROgram(s) HFA Inhaler 2 Puff(s) Inhalation every 6 hours PRN  ALBUTerol    90 MICROgram(s) HFA Inhaler 2 Puff(s) Inhalation every 6 hours PRN  ipratropium 17 MICROgram(s) HFA Inhaler 1 Puff(s) Inhalation every 6 hours    Hematalogic:  apixaban 2.5 milliGRAM(s) Oral two times a day  aspirin  chewable 81 milliGRAM(s) Oral daily    Other:  acetaminophen     Tablet .. 650 milliGRAM(s) Oral every 6 hours PRN  chlorhexidine 0.12% Liquid 15 milliLiter(s) Oral Mucosa every 12 hours  chlorhexidine 2% Cloths 1 Application(s) Topical daily  cholecalciferol 1000 Unit(s) Oral daily  dexMEDEtomidine Infusion 0.5 MICROgram(s)/kG/Hr IV Continuous <Continuous>  famotidine    Tablet 20 milliGRAM(s) Oral daily  fentaNYL   Infusion. 2 MICROgram(s)/kG/Hr IV Continuous <Continuous>  propofol Infusion 5 MICROgram(s)/kG/Min IV Continuous <Continuous>  simvastatin 10 milliGRAM(s) Oral at bedtime    acetaminophen     Tablet .. 650 milliGRAM(s) Oral every 6 hours PRN  acetylcysteine 20% for bronchoscopy 4 milliLiter(s) Nebulizer once  ALBUTerol    90 MICROgram(s) HFA Inhaler 2 Puff(s) Inhalation every 6 hours PRN  ALBUTerol    90 MICROgram(s) HFA Inhaler 2 Puff(s) Inhalation every 6 hours PRN  apixaban 2.5 milliGRAM(s) Oral two times a day  aspirin  chewable 81 milliGRAM(s) Oral daily  azithromycin  IVPB      azithromycin  IVPB 500 milliGRAM(s) IV Intermittent every 24 hours  cefTRIAXone   IVPB 1000 milliGRAM(s) IV Intermittent every 24 hours  chlorhexidine 0.12% Liquid 15 milliLiter(s) Oral Mucosa every 12 hours  chlorhexidine 2% Cloths 1 Application(s) Topical daily  cholecalciferol 1000 Unit(s) Oral daily  dexMEDEtomidine Infusion 0.5 MICROgram(s)/kG/Hr IV Continuous <Continuous>  famotidine    Tablet 20 milliGRAM(s) Oral daily  fentaNYL   Infusion. 2 MICROgram(s)/kG/Hr IV Continuous <Continuous>  furosemide   Injectable 60 milliGRAM(s) IV Push every 12 hours  ipratropium 17 MICROgram(s) HFA Inhaler 1 Puff(s) Inhalation every 6 hours  norepinephrine Infusion 0.05 MICROgram(s)/kG/Min IV Continuous <Continuous>  propofol Infusion 5 MICROgram(s)/kG/Min IV Continuous <Continuous>  simvastatin 10 milliGRAM(s) Oral at bedtime  tamsulosin 0.8 milliGRAM(s) Oral at bedtime    Drug Dosing Weight  Height (cm): 177.8 (11 Mar 2022 10:00)  Weight (kg): 71.8 (11 Mar 2022 16:32)  BMI (kg/m2): 22.7 (11 Mar 2022 16:32)  BSA (m2): 1.89 (11 Mar 2022 16:32)    CENTRAL LINE:  [ X] NO     LOVELACE: [X ] YES 3/11      PMH -reviewed admission note, no change since admission    ICU Vital Signs Last 24 Hrs  T(C): 35.7 (12 Mar 2022 16:45), Max: 37.2 (12 Mar 2022 07:15)  T(F): 96.3 (12 Mar 2022 16:45), Max: 99 (12 Mar 2022 07:15)  HR: 87 (12 Mar 2022 20:15) (78 - 115)  BP: 88/61 (12 Mar 2022 20:15) (73/54 - 186/167)  BP(mean): 71 (12 Mar 2022 20:15) (61 - 175)  ABP: --  ABP(mean): --  RR: 12 (12 Mar 2022 20:15) (12 - 28)  SpO2: 99% (12 Mar 2022 20:15) (83% - 100%)      ABG - ( 11 Mar 2022 18:09 )  pH, Arterial: 7.51  pH, Blood: x     /  pCO2: 33    /  pO2: 127   / HCO3: 26    / Base Excess: 3.6   /  SaO2: 99                    03-11 @ 07:01  -  03-12 @ 07:00  --------------------------------------------------------  IN: 453.7 mL / OUT: 90 mL / NET: 363.7 mL        Mode: AC/ CMV (Assist Control/ Continuous Mandatory Ventilation)  RR (machine): 12  TV (machine): 450  FiO2: 60  PEEP: 5  ITime: 1  MAP: 8  PIP: 24      PHYSICAL EXAM:    General: intubated, sedated  HEENT:  PERRLA + ETT           Lungs: Bilateral crackles  Cardiovascular: Regular, (+) +2 systolic murmur  Gastrointestinal: Soft, Positive BS  Musculoskeletal: No clubbing.  Moves all extremities.    Skin: Warm.  Intact  Neurological: No motor or sensory deficit     LABS:  CBC Full  -  ( 12 Mar 2022 04:44 )  WBC Count : 14.36 K/uL  RBC Count : 4.99 M/uL  Hemoglobin : 15.4 g/dL  Hematocrit : 44.8 %  Platelet Count - Automated : 220 K/uL  Mean Cell Volume : 89.8 fl  Mean Cell Hemoglobin : 30.9 pg  Mean Cell Hemoglobin Concentration : 34.4 gm/dL  Auto Neutrophil # : 13.00 K/uL  Auto Lymphocyte # : 0.50 K/uL  Auto Monocyte # : 0.78 K/uL  Auto Eosinophil # : 0.00 K/uL  Auto Basophil # : 0.01 K/uL  Auto Neutrophil % : 90.5 %  Auto Lymphocyte % : 3.5 %  Auto Monocyte % : 5.4 %  Auto Eosinophil % : 0.0 %  Auto Basophil % : 0.1 %    03-12    136  |  101  |  69<H>  ----------------------------<  79  5.0   |  26  |  2.89<H>    Ca    9.5      12 Mar 2022 04:44  Phos  4.1     03-12  Mg     2.4     03-12    TPro  6.1  /  Alb  2.9<L>  /  TBili  2.5<H>  /  DBili  x   /  AST  489<H>  /  ALT  448<H>  /  AlkPhos  91  03-12        Culture Results:   Numerous Serratia marcescens (03-11 @ 21:15)      RADIOLOGY & ADDITIONAL STUDIES REVIEWED:  ***    GOALS OF CARE DISCUSSION WITH PATIENT/FAMILY/PROXY:    CRITICAL CARE TIME SPENT: 35 minutes

## 2022-03-13 NOTE — GOALS OF CARE CONVERSATION - ADVANCED CARE PLANNING - CONVERSATION DETAILS
Family does not want patient to suffer  Zarina expressed that she has seen an overall decline in the functionality and quality of life of the father, and has been concerned that Traches and Pegs would only prolong suffering    Zarina was informed that the patient also has thick secretion that would place him at risk of re-intubation.   Stated that she would like an SBT, if patient fails SBT to give him more time, and if patient passes, to extubate to comfort care Family does not want patient to suffer  Zarina expressed that she has seen an overall decline in the functionality and quality of life of the father, and has been concerned that Traches and Pegs would only prolong suffering    Zarina was informed that the patient also has thick secretion that would place him at risk of re-intubation.   Stated that she would like an SBT, if patient fails SBT to give him more time as long as he is NOT SUFFERING, and if patient passes, to extubate to comfort care    SBT: for tomorrow around 9am - 10am

## 2022-03-13 NOTE — PROGRESS NOTE ADULT - ASSESSMENT
89 years old male with history of HTN, BPH,latent TB and COPD, CHF who was brought to ED due to shortness of breath that started 2 weeks ago. Patient admitted on tele for acute on chronic CHF. Oxygen requirement increased over course of admission due to pulmonary edema , patient started on lasix drip 2.5 ml/hr, pt refused dupont , I&Os not fully documented , daily weight increased from 160 to 186, accuracy is questionable.  I was paged by nurse that patient is saturating in 80s on 6 L NC , placed on NRB 15 liters with improvement in saturation to 98% , patient uses accessory muscles for breathing, MS at baseline , repeat CXR still congested with mild improvement in right upper lung , ABG - ( 11 Mar 2022 06:18 ) pH, Arterial: 7.41 /  pCO2: 41  /  pO2: 122 / HCO3: 26  / Base Excess: 1.2  /  SaO2: 99,  bed side Lung Pocus showed predominant  diffuse B lines in b/l anterior lung fields, Bilateral pleural effusion R>L , A flap of floating atelectatic lung seen in right pleural effusion. IVC plum however compressible,  could not measure maximum diameter due to patients constant coughing , Lasix drip rate increased to 5ml/hour , patient placed on BI-PAP. Bladder scan showed 210cc retaining, pt endorses difficulty urinating, however refused Dupont. Pt transferred to ICU requiring new Bi-Pap.     Assessment and plan :    #Acute hypoxic respiratory failure 2/2 CHF exacerbation  #Atrial fibrillation  #LUANNE  #COPD  #HTN  #BPH    Neuro  AAOX3, no issues     Cardio   #Acute hypoxic respiratory failure 2/2 CHF exacerbation  -p/w SOB  - Pt was hypoxic and tachypneic on NRB, was put on BiPAP for increased work of breathing   - Echo showed grossly severe global left ventricular systolic dysfunction, and b/l pleural effusions  - CXR shows worsening b/l pleural effusion   - cont lasix  - Strict I&Os   - daily weight    #Afib  -c/w eliquis and metoprolol     #HTN  -c/w imdur and metoprolol     Pulm   #Acute hypoxic respiratory failure 2/2 CHF exacerbation  - cont lasix  - intubated and sedated    #COPD  -c/w Albuterol inhaler prn    GI   No active issues    Renal  #LUANNE  -Baseline Cr is normal  -Likely pre-renal LUANNE due to CHF  -c/w lasix  -Monitor BUN/Cr   - sodium bicarb drip    Endo  No active issues    ID  No active issues    Prophylaxis  Eliquis for DVT ppx    GOC  DNR, Trial of intubation   89 years old male with history of HTN, BPH,latent TB and COPD, CHF who was brought to ED due to shortness of breath that started 2 weeks ago. Patient admitted on tele for acute on chronic CHF. Oxygen requirement increased over course of admission due to pulmonary edema , patient started on lasix drip 2.5 ml/hr, pt refused dupont , I&Os not fully documented , daily weight increased from 160 to 186, accuracy is questionable.  I was paged by nurse that patient is saturating in 80s on 6 L NC , placed on NRB 15 liters with improvement in saturation to 98% , patient uses accessory muscles for breathing, MS at baseline , repeat CXR still congested with mild improvement in right upper lung , ABG - ( 11 Mar 2022 06:18 ) pH, Arterial: 7.41 /  pCO2: 41  /  pO2: 122 / HCO3: 26  / Base Excess: 1.2  /  SaO2: 99,  bed side Lung Pocus showed predominant  diffuse B lines in b/l anterior lung fields, Bilateral pleural effusion R>L , A flap of floating atelectatic lung seen in right pleural effusion. IVC plum however compressible,  could not measure maximum diameter due to patients constant coughing , Lasix drip rate increased to 5ml/hour , patient placed on BI-PAP. Bladder scan showed 210cc retaining, pt endorses difficulty urinating, however refused Dupont. Pt transferred to ICU requiring new Bi-Pap.     Assessment and plan :    #Acute hypoxic respiratory failure 2/2 CHF exacerbation  #Atrial fibrillation  #LUANNE  #COPD  #HTN  #BPH    Neuro  AAOX3, no issues     Cardio   #Acute hypoxic respiratory failure 2/2 CHF exacerbation  -p/w SOB  - Pt was hypoxic and tachypneic on NRB, was put on BiPAP for increased work of breathing   - Echo showed grossly severe global left ventricular systolic dysfunction, and b/l pleural effusions  - CXR shows worsening b/l pleural effusion   - cont lasix 60mg IV BID  - Strict I&Os   - daily weight    #Afib  -c/w eliquis and metoprolol     #HTN  -c/w imdur and metoprolol     Pulm   #Acute hypoxic respiratory failure 2/2 CHF exacerbation  - cont lasix  - intubated and sedated  -Vent settings 12/420/5/40 sats 96%      #COPD  -c/w Albuterol inhaler prn  -on ceftriaxone and azithromycin day 3    GI   No active issues    Renal  #LUANNE  -Baseline Cr is normal  -Likely pre-renal LUANNE due to CHF  -c/w lasix  -Monitor BUN/Cr   - sodium bicarb drip    Endo  No active issues    ID  No active issues    Prophylaxis  Eliquis for DVT ppx    GOC  DNR, Trial of intubation   89 years old male with history of HTN, BPH,latent TB and COPD, CHF who was brought to ED due to shortness of breath that started 2 weeks ago. Patient admitted on tele for acute on chronic CHF. Oxygen requirement increased over course of admission due to pulmonary edema , patient started on lasix drip 2.5 ml/hr, pt refused dupont , I&Os not fully documented , daily weight increased from 160 to 186, accuracy is questionable.  I was paged by nurse that patient is saturating in 80s on 6 L NC , placed on NRB 15 liters with improvement in saturation to 98% , patient uses accessory muscles for breathing, MS at baseline , repeat CXR still congested with mild improvement in right upper lung , ABG - ( 11 Mar 2022 06:18 ) pH, Arterial: 7.41 /  pCO2: 41  /  pO2: 122 / HCO3: 26  / Base Excess: 1.2  /  SaO2: 99,  bed side Lung Pocus showed predominant  diffuse B lines in b/l anterior lung fields, Bilateral pleural effusion R>L , A flap of floating atelectatic lung seen in right pleural effusion. IVC plum however compressible,  could not measure maximum diameter due to patients constant coughing , Lasix drip rate increased to 5ml/hour , patient placed on BI-PAP. Bladder scan showed 210cc retaining, pt endorses difficulty urinating, however refused Dupont. Pt transferred to ICU requiring new Bi-Pap.     Assessment and plan :    #Acute hypoxic respiratory failure 2/2 CHF exacerbation  #Atrial fibrillation  #LUANNE  #COPD  #HTN  #BPH    Neuro  AAOX3, no issues     Cardio   #Acute hypoxic respiratory failure 2/2 CHF exacerbation  -p/w SOB  - Pt was hypoxic and tachypneic on NRB, was put on BiPAP for increased work of breathing   - Echo showed grossly severe global left ventricular systolic dysfunction, and b/l pleural effusions  - CXR shows worsening b/l pleural effusion   - cont lasix 60mg IV BID  - Strict I&Os   - daily weight    #Afib  -on Eliquis  and metoprolol   -witch to Heparin drip in case he needs procedure    #HTN  -c/w imdur and metoprolol     Pulm   #Acute hypoxic respiratory failure 2/2 CHF exacerbation  - cont lasix  - intubated and sedated  -Vent settings 12/420/5/40 sats 96%      #COPD  -c/w Albuterol inhaler prn  -on ceftriaxone and azithromycin day 4    GI   No active issues    Renal  #LUANNE  -Baseline Cr is normal  -Likely pre-renal LUANNE due to CHF  -c/w lasix  -Monitor BUN/Cr   - sodium bicarb drip    Endo  No active issues    ID  No active issues    Prophylaxis  Eliquis for DVT ppx    GOC  DNR, Trial of intubation   But the family does not want reintubation

## 2022-03-13 NOTE — PROGRESS NOTE ADULT - ASSESSMENT
89 years old male with history of HTN, BPH,latent TB and COPD, CHF who was brought to ED due to shortness of breath that started 2 weeks ago. Patient admitted on tele for acute on chronic CHF. Oxygen requirement increased over course of admission due to pulmonary edema , patient started on lasix drip 2.5 ml/hr, pt refused dupont , I&Os not fully documented , daily weight increased from 160 to 186, accuracy is questionable.  I was paged by nurse that patient is saturating in 80s on 6 L NC , placed on NRB 15 liters with improvement in saturation to 98% , patient uses accessory muscles for breathing, MS at baseline , repeat CXR still congested with mild improvement in right upper lung , ABG - ( 11 Mar 2022 06:18 ) pH, Arterial: 7.41 /  pCO2: 41  /  pO2: 122 / HCO3: 26  / Base Excess: 1.2  /  SaO2: 99,  bed side Lung Pocus showed predominant  diffuse B lines in b/l anterior lung fields, Bilateral pleural effusion R>L , A flap of floating atelectatic lung seen in right pleural effusion. IVC plum however compressible,  could not measure maximum diameter due to patients constant coughing , Lasix drip rate increased to 5ml/hour , patient placed on BI-PAP. Bladder scan showed 210cc retaining, pt endorses difficulty urinating, however refused Dupont. Pt transferred to ICU requiring new Bi-Pap.     Assessment and plan :    #Acute hypoxic respiratory failure 2/2 CHF exacerbation  #Atrial fibrillation  #LUANNE  #COPD  #HTN  #BPH    Neuro  AAOX3, no issues     Cardio   #Acute hypoxic respiratory failure 2/2 CHF exacerbation  -p/w SOB  - Pt was hypoxic and tachypneic on NRB, was put on BiPAP for increased work of breathing   - Echo showed grossly severe global left ventricular systolic dysfunction, and b/l pleural effusions  - CXR shows worsening b/l pleural effusion   - cont lasix 60mg IV BID  - Strict I&Os   - daily weight    #Afib  -on Eliquis  and metoprolol   -witch to Heparin drip in case he needs procedure    #HTN  -c/w imdur and metoprolol     Pulm   #Acute hypoxic respiratory failure 2/2 CHF exacerbation  - cont lasix  - intubated and sedated  -Vent settings 12/420/5/40 sats 96%      #COPD  -c/w Albuterol inhaler prn  -on ceftriaxone and azithromycin day 4    GI   No active issues    Renal  #LUANNE  -Baseline Cr is normal  -Likely pre-renal LUANNE due to CHF  -c/w lasix  -Monitor BUN/Cr   - sodium bicarb drip    Endo  No active issues    ID  No active issues    Prophylaxis  Eliquis for DVT ppx

## 2022-03-14 NOTE — PHARMACOTHERAPY INTERVENTION NOTE - COMMENTS
Recommended switching from tamsulosin to doxazosin because the patient has a NGT and tamsulosin cannot be crushed or opened.

## 2022-03-14 NOTE — CONSULT NOTE ADULT - ATTENDING COMMENTS
89 y M with advanced CHF adm for acute hypoxic resp failure, CHF exacerbation, req intubation. No significant clinical improvement w medical management, diuresis. Cardio following. s/p extubation today, no reintubation per daughter's wishes. Min responsive, on NRB, labored breathing, audible secretions, NAD. DNR/DNI, comfort measures only per d/w daughter.  Appropriate for IPU for management of dyspnea, secretions, family in agreement. For transer to IPU pending consents, bed availability. Palliativ will follow.

## 2022-03-14 NOTE — CONSULT NOTE ADULT - PROBLEM SELECTOR RECOMMENDATION 3
Planned for extubation medically vs palliative pending SBT trial.  will continue to follow Planned for extubation medically vs palliative pending SBT trial.  No reintubation  will continue to follow Pt medically extubated. May qualify for inpatient hospice pending further discussion with family.  No reintubation  will continue to follow Extubated 3/14.   Pt for comfort measures only, updated MOLST in chart  Will refer patient for inpatient hospice  will continue to follow

## 2022-03-14 NOTE — CONSULT NOTE ADULT - SUBJECTIVE AND OBJECTIVE BOX
Consult to: Discuss complex medical decision making related to goals of care    [facility] Geriatric and Palliative Consult Service:  Dr. Lisa Irby: cell ([phone])  Dr. Michelle Green: cell ([phone])   Zion Delgado NP: cell ([phone])   Delma Hope NP: cell ([phone])  Satinder Nunez LMSW: cell ([phone])     HPI:  Patient is 89 years old male with past medical history of past medical history of HTN, BPH,latent TB and COPD, CHF who was brought to ED due to shortness of breath that started 2 weeks ago. Patient reports that for the last two weeks, His shortness of breath was getting worse. Patient reports that he wasn't aware that he has heart problem. On physical exam, Patient has diffuse crackles throughout lungs and shortness of breathing when lying down. Patient is saturating 96% on 3L NC in ED. Patient denied chest pain, abdominal pain, N/V or shortness of breath.  (08 Mar 2022 03:29)      PAST MEDICAL & SURGICAL HISTORY:  History of hypertension    Hypercholesterolemia    BPH (benign prostatic hyperplasia)    History of COPD        SOCIAL HISTORY:    Admitted from:  home  (with HHA)           assisted living          [facility]       LTC   [ none ] Substance abuse, [ none ] Tobacco hx, [ none ] Alcohol hx, [ none ] Home Opioid Hx    FAMILY HISTORY:   unable to obtain from patient due to poor mentation, family unable to give information, see H&P for history  Baseline ADLs (prior to admission):    Allergies    No Known Allergies    Intolerances      Present Symptoms:   Pain:  Fatigue:  Nausea:  Lack of Appetite:   SOB:  Depression:  Anxiety:  Review of Systems: [All others negative or Unable to obtain due to poor mentation]    MEDICATIONS  (STANDING):  buMETAnide Injectable 2 milliGRAM(s) IV Push every 12 hours  cefTRIAXone   IVPB 1000 milliGRAM(s) IV Intermittent every 24 hours  chlorhexidine 0.12% Liquid 15 milliLiter(s) Oral Mucosa every 12 hours  chlorhexidine 2% Cloths 1 Application(s) Topical daily  fentaNYL   Infusion. 2 MICROgram(s)/kG/Hr (14.4 mL/Hr) IV Continuous <Continuous>  ipratropium 17 MICROgram(s) HFA Inhaler 1 Puff(s) Inhalation every 6 hours  norepinephrine Infusion 0.05 MICROgram(s)/kG/Min (6.73 mL/Hr) IV Continuous <Continuous>  propofol Infusion 5 MICROgram(s)/kG/Min (2.15 mL/Hr) IV Continuous <Continuous>  simvastatin 10 milliGRAM(s) Oral at bedtime  tamsulosin 0.8 milliGRAM(s) Oral at bedtime    MEDICATIONS  (PRN):  acetaminophen     Tablet .. 650 milliGRAM(s) Oral every 6 hours PRN Moderate Pain (4 - 6)  ALBUTerol    90 MICROgram(s) HFA Inhaler 2 Puff(s) Inhalation every 6 hours PRN Shortness of Breath and/or Wheezing      PHYSICAL EXAM:  Vital Signs Last 24 Hrs  T(C): 37.2 (14 Mar 2022 07:30), Max: 37.2 (14 Mar 2022 07:30)  T(F): 99 (14 Mar 2022 07:30), Max: 99 (14 Mar 2022 07:30)  HR: 109 (14 Mar 2022 12:00) (89 - 127)  BP: 87/64 (14 Mar 2022 12:00) (68/57 - 116/94)  BP(mean): 71 (14 Mar 2022 12:00) (59 - 102)  RR: 28 (14 Mar 2022 12:00) (12 - 43)  SpO2: 92% (14 Mar 2022 12:00) (86% - 98%)    General: alert  oriented x ____    lethargic distressed cachexia  nonverbal  unarousable verbal    Palliative Performance Scale/Karnofsky Score:  ECOG Performance:    HEENT: no abnormal lesion, dry mouth  ET tube/trach oral lesions:  Lungs: tachypnea/labored breathing, audible excessive secretions  CV: RRR, S1S2, tachycardia  GI: soft non distended non tender  incontinent               PEG/NG/OG tube  constipation  last BM:   : incontinent  oliguria/anuria  dupont  Musculoskeletal: weakness x4 edema x4    ambulatory with assistance   mostly/fully bedbound/wheelchair bound  Skin: no abnormal skin lesions, poor skin turgor, pressure ulcer stage:   Neuro: no deficits, mild cognitive impairment dsyphagia/dysarthria paresis  Oral intake ability: unable/only mouth care, minimal moderate full capability    LABS:                        14.8   11.67 )-----------( 182      ( 14 Mar 2022 04:31 )             44.0     03-14    139  |  100  |  70<H>  ----------------------------<  92  4.0   |  33<H>  |  2.59<H>    Ca    8.7      14 Mar 2022 04:30  Phos  4.0     03-14  Mg     2.3     03-14    TPro  5.5<L>  /  Alb  2.2<L>  /  TBili  2.2<H>  /  DBili  x   /  AST  111<H>  /  ALT  232<H>  /  AlkPhos  88  03-14        RADIOLOGY & ADDITIONAL STUDIES:     Consult to: Discuss complex medical decision making related to goals of care    [facility] Geriatric and Palliative Consult Service:  Dr. Lisa Irby: cell ([phone])  Dr. Michelle Green: cell ([phone])   Zion Delgado NP: cell ([phone])   Delma Hope NP: cell ([phone])  Satinder Nunez LMSW: cell ([phone])     HPI:  Patient is 89 years old male with past medical history of past medical history of HTN, BPH,latent TB and COPD, CHF who was brought to ED due to shortness of breath that started 2 weeks ago. Patient reports that for the last two weeks, His shortness of breath was getting worse. Patient reports that he wasn't aware that he has heart problem. On physical exam, Patient has diffuse crackles throughout lungs and shortness of breathing when lying down. Patient is saturating 96% on 3L NC in ED. Patient denied chest pain, abdominal pain, N/V or shortness of breath.  (08 Mar 2022 03:29)      PAST MEDICAL & SURGICAL HISTORY:  History of hypertension    Hypercholesterolemia    BPH (benign prostatic hyperplasia)    History of COPD        SOCIAL HISTORY:    Admitted from:  home  (with HHA)           assisted living          [facility]       LTC   [ none ] Substance abuse, [ none ] Tobacco hx, [ none ] Alcohol hx, [ none ] Home Opioid Hx    FAMILY HISTORY:   unable to obtain from patient due to poor mentation, family unable to give information, see H&P for history  Baseline ADLs (prior to admission):    Allergies    No Known Allergies    Intolerances      Present Symptoms:   Pain:  Fatigue:  Nausea:  Lack of Appetite:   SOB:  Depression:  Anxiety:  Review of Systems: [All others negative or Unable to obtain due to poor mentation]    MEDICATIONS  (STANDING):  buMETAnide Injectable 2 milliGRAM(s) IV Push every 12 hours  cefTRIAXone   IVPB 1000 milliGRAM(s) IV Intermittent every 24 hours  chlorhexidine 0.12% Liquid 15 milliLiter(s) Oral Mucosa every 12 hours  chlorhexidine 2% Cloths 1 Application(s) Topical daily  fentaNYL   Infusion. 2 MICROgram(s)/kG/Hr (14.4 mL/Hr) IV Continuous <Continuous>  ipratropium 17 MICROgram(s) HFA Inhaler 1 Puff(s) Inhalation every 6 hours  norepinephrine Infusion 0.05 MICROgram(s)/kG/Min (6.73 mL/Hr) IV Continuous <Continuous>  propofol Infusion 5 MICROgram(s)/kG/Min (2.15 mL/Hr) IV Continuous <Continuous>  simvastatin 10 milliGRAM(s) Oral at bedtime  tamsulosin 0.8 milliGRAM(s) Oral at bedtime    MEDICATIONS  (PRN):  acetaminophen     Tablet .. 650 milliGRAM(s) Oral every 6 hours PRN Moderate Pain (4 - 6)  ALBUTerol    90 MICROgram(s) HFA Inhaler 2 Puff(s) Inhalation every 6 hours PRN Shortness of Breath and/or Wheezing      PHYSICAL EXAM:  Vital Signs Last 24 Hrs  T(C): 37.2 (14 Mar 2022 07:30), Max: 37.2 (14 Mar 2022 07:30)  T(F): 99 (14 Mar 2022 07:30), Max: 99 (14 Mar 2022 07:30)  HR: 109 (14 Mar 2022 12:00) (89 - 127)  BP: 87/64 (14 Mar 2022 12:00) (68/57 - 116/94)  BP(mean): 71 (14 Mar 2022 12:00) (59 - 102)  RR: 28 (14 Mar 2022 12:00) (12 - 43)  SpO2: 92% (14 Mar 2022 12:00) (86% - 98%)    General:  (+) intubated/sedated    Palliative Performance Scale/Karnofsky Score:  ECOG Performance:     HEENT: no abnormal lesion, dry mouth  ET tube/trach oral lesions: increased  Lungs: (+) secretions present  CV: RRR, S1S2  GI: soft non distended non tender  incontinent               NG tube  constipation  last BM:   : incontinent  oliguria/anuria  dupont  Musculoskeletal: weakness x4 edema x4    Skin: no abnormal skin lesions, poor skin turgor,   Neuro: no deficits, pinpoint pupils  Oral intake ability: unable/only mouth care, minimal moderate full capability    LABS:                        14.8   11.67 )-----------( 182      ( 14 Mar 2022 04:31 )             44.0     03-14    139  |  100  |  70<H>  ----------------------------<  92  4.0   |  33<H>  |  2.59<H>    Ca    8.7      14 Mar 2022 04:30  Phos  4.0     03-14  Mg     2.3     03-14    TPro  5.5<L>  /  Alb  2.2<L>  /  TBili  2.2<H>  /  DBili  x   /  AST  111<H>  /  ALT  232<H>  /  AlkPhos  88  03-14        RADIOLOGY & ADDITIONAL STUDIES:     Consult to: Discuss complex medical decision making related to goals of care    [facility] Geriatric and Palliative Consult Service:  Dr. Lisa Irby: cell ([phone])  Dr. Michelle Green: cell ([phone])   Zion Delgado NP: cell ([phone])   Delma Hope NP: cell ([phone])  Satinder Nunez LMSW: cell ([phone])     HPI:  Patient is 89 years old male with past medical history of past medical history of HTN, BPH,latent TB and COPD, CHF who was brought to ED due to shortness of breath that started 2 weeks ago. Patient reports that for the last two weeks, His shortness of breath was getting worse. Patient reports that he wasn't aware that he has heart problem. On physical exam, Patient has diffuse crackles throughout lungs and shortness of breathing when lying down. Patient is saturating 96% on 3L NC in ED. Patient denied chest pain, abdominal pain, N/V or shortness of breath.  (08 Mar 2022 03:29)    Interval history: patient s/p extubation today, no reintubation per daughter's wishes, on NRB, +work of breathing, audible secretions. Daughter at bedside.       PAST MEDICAL & SURGICAL HISTORY:  History of hypertension    Hypercholesterolemia    BPH (benign prostatic hyperplasia)    History of COPD        SOCIAL HISTORY:    Admitted from:  home  (with A)         [ none ] Substance abuse, [ none ] Tobacco hx, [ none ] Alcohol hx, [ none ] Home Opioid Hx    FAMILY HISTORY:   unable to obtain from patient due to poor mentation, family unable to give information, see H&P for history  Baseline ADLs (prior to admission):    Allergies    No Known Allergies    Intolerances      Present Symptoms:      Review of Systems:   Unable to obtain due to poor mentation     MEDICATIONS  (STANDING):  buMETAnide Injectable 2 milliGRAM(s) IV Push every 12 hours  cefTRIAXone   IVPB 1000 milliGRAM(s) IV Intermittent every 24 hours  chlorhexidine 0.12% Liquid 15 milliLiter(s) Oral Mucosa every 12 hours  chlorhexidine 2% Cloths 1 Application(s) Topical daily  fentaNYL   Infusion. 2 MICROgram(s)/kG/Hr (14.4 mL/Hr) IV Continuous <Continuous>  ipratropium 17 MICROgram(s) HFA Inhaler 1 Puff(s) Inhalation every 6 hours  norepinephrine Infusion 0.05 MICROgram(s)/kG/Min (6.73 mL/Hr) IV Continuous <Continuous>  propofol Infusion 5 MICROgram(s)/kG/Min (2.15 mL/Hr) IV Continuous <Continuous>  simvastatin 10 milliGRAM(s) Oral at bedtime  tamsulosin 0.8 milliGRAM(s) Oral at bedtime    MEDICATIONS  (PRN):  acetaminophen     Tablet .. 650 milliGRAM(s) Oral every 6 hours PRN Moderate Pain (4 - 6)  ALBUTerol    90 MICROgram(s) HFA Inhaler 2 Puff(s) Inhalation every 6 hours PRN Shortness of Breath and/or Wheezing      PHYSICAL EXAM:  Vital Signs Last 24 Hrs  T(C): 37.2 (14 Mar 2022 07:30), Max: 37.2 (14 Mar 2022 07:30)  T(F): 99 (14 Mar 2022 07:30), Max: 99 (14 Mar 2022 07:30)  HR: 109 (14 Mar 2022 12:00) (89 - 127)  BP: 87/64 (14 Mar 2022 12:00) (68/57 - 116/94)  BP(mean): 71 (14 Mar 2022 12:00) (59 - 102)  RR: 28 (14 Mar 2022 12:00) (12 - 43)  SpO2: 92% (14 Mar 2022 12:00) (86% - 98%)    General:  (+) intubated/sedated    Palliative Performance Scale/Karnofsky Score: 10       HEENT: no abnormal lesion, dry mouth  ET tube/trach oral lesions: increased  Lungs: (+) secretions present  CV: RRR, S1S2  GI: soft non distended non tender  incontinent               NG tube     : incontinent  oliguria/anuria  dupont  Musculoskeletal: weakness x4 edema x4    Skin: no abnormal skin lesions, poor skin turgor,   Neuro: no deficits, pinpoint pupils  Oral intake ability: unable/only mouth care     LABS:                        14.8   11.67 )-----------( 182      ( 14 Mar 2022 04:31 )             44.0     03-14    139  |  100  |  70<H>  ----------------------------<  92  4.0   |  33<H>  |  2.59<H>    Ca    8.7      14 Mar 2022 04:30  Phos  4.0     03-14  Mg     2.3     03-14    TPro  5.5<L>  /  Alb  2.2<L>  /  TBili  2.2<H>  /  DBili  x   /  AST  111<H>  /  ALT  232<H>  /  AlkPhos  88  03-14        RADIOLOGY & ADDITIONAL STUDIES:  < from: Transthoracic Echocardiogram (03.09.22 @ 07:02) >  Patient name: PAWAN STALEY  YOB: 1923   Age: 89 (M)   MR#: [id number]  Study Date: 3/9/2022  [facility]  Study quality: Technically difficult  Referring Physician: Miguelito Javier MD  Blood Pressure: 105/65 mmHg  Height: 175 cm  Weight: 77 kg  BSA: 1.9 m2  ------------------------------------------------------------------------    PROCEDURE: Transthoracic echocardiogram with 2-D, M-Mode  and complete spectral and color flow Doppler.  INDICATION: Heart failure, unspecified (I50.9)  HISTORY:  ------------------------------------------------------------------------  DIMENSIONS:  Dimensions:     Normal Values:  LA:     4.1 cm    2.0 - 4.0 cm  Ao:     3.9 cm    2.0 - 3.8 cm  SEPTUM: 1.0 cm    0.6 - 1.2 cm  PWT:    1.1 cm    0.6 - 1.1 cm  LVIDd:  6.2 cm    3.0 - 5.6 cm  LVIDs:  5.3 cm    1.8 - 4.0 cm      Derived Variables:  LVMI: 144 g/m2  RWT: 0.35  Ejection Fraction Visual Estimate: 20-25 %    ------------------------------------------------------------------------  OBSERVATIONS:  Mitral Valve: Normal mitral valve. Mild to moderate mitral  regurgitation.  Aortic Root: Normal aortic root.  Aortic Valve: Normal trileaflet aortic valve. Mild aortic  regurgitation.  Left Atrium: Normal left atrium.  LA volume index = 31  cc/m2.  Left Ventricle: Endocardium not well visualized; grossly  severe global left ventricular systolic dysfunction.  Moderate left ventricular enlargement. Unable to adequately  assess diastolic function due to technical aspects of this  study.  Right Heart: Normal right atrium. Right ventricular  enlargement with decreased RV systolic function.  (TAPSE  1.0 cm) There is mild-moderate tricuspid regurgitation.  There is mild pulmonic regurgitation.  Pericardium/PleuraSmall pericardial effusion.   No  echocardiographic evidence of tamponade physiology.  Bilateral pleural effusions.  Hemodynamic: RA Pressure is 10 mm Hg. RV systolic pressure  is 39 mm Hg.  ------------------------------------------------------------------------  CONCLUSIONS:  1. Mild to moderate mitral regurgitation.  2.  Mild aortic regurgitation.  3. Moderate left ventricular enlargement.  4. Endocardium not well visualized; grossly severe global  left ventricular systolic dysfunction.  5. Right ventricular enlargement with decreased RV systolic  function.  (TAPSE 1.0 cm)  6. Small pericardial effusion.   No echocardiographic  evidence of tamponade physiology.  7. Bilateral pleural effusions.    ------------------------------------------------------------------------  Confirmed on  3/9/2022 - 13:27:58 by Austin Edwards MD  ------------------------------------------------------------------------    < end of copied text >

## 2022-03-14 NOTE — PROGRESS NOTE ADULT - TIME BILLING
- Review of records, telemetry, vital signs and daily labs.   - General and cardiovascular physical examination.  - Generation of cardiovascular treatment plan.  - Coordination of care.      Patient was seen and examined by me on 3/12/2022,interim events noted,labs and radiology studies reviewed.  Willian Richardson MD,FACC.  [address] [place], [place] [id number].  [phone]
- Review of records, telemetry, vital signs and daily labs.   - General and cardiovascular physical examination.  - Generation of cardiovascular treatment plan.  - Coordination of care.      Patient was seen and examined by me on 3/14/2022,interim events noted,labs and radiology studies reviewed.  Willian Richardson MD,FACC.  [address] [place], [place] [id number].  [phone]
- Review of records, telemetry, vital signs and daily labs.   - General and cardiovascular physical examination.  - Generation of cardiovascular treatment plan.  - Coordination of care.      Patient was seen and examined by me on 3/13/2022,interim events noted,labs and radiology studies reviewed.  Willian Richardson MD,FACC.  [address] [place], [place] [id number].  [phone]
- Review of records, telemetry, vital signs and daily labs.   - General and cardiovascular physical examination.  - Generation of cardiovascular treatment plan.  - Coordination of care.      Patient was seen and examined by me on 03/09/2022 ,interim events noted,labs and radiology studies reviewed.  Willian Richardson MD,FACC.  [address] [place], [place] [id number].  [phone]
- Review of records, telemetry, vital signs and daily labs.   - General and cardiovascular physical examination.  - Generation of cardiovascular treatment plan.  - Coordination of care.      Patient was seen and examined by me on 3/11/2022,interim events noted,labs and radiology studies reviewed.  Willian Richardson MD,FACC.  [address] [place], [place] [id number].  [phone]
Patient was seen and examined by me  on 3/10/2022 ,interim events noted,Laboratory and Imaging studies were reviewed.  Thank you for the courtesy of the consultation,I would be available for any further discussion if needed.  Willian Richardson MD,FACC.  [address] [place] [address] [phone]

## 2022-03-14 NOTE — PROGRESS NOTE ADULT - ASSESSMENT
89 years old male with history of HTN, BPH,latent TB and COPD, CHF who was brought to ED due to shortness of breath that started 2 weeks ago. Patient admitted on tele for acute on chronic CHF. Oxygen requirement increased over course of admission due to pulmonary edema , patient started on lasix drip 2.5 ml/hr, pt refused dupont , I&Os not fully documented , daily weight increased from 160 to 186, accuracy is questionable.  I was paged by nurse that patient is saturating in 80s on 6 L NC , placed on NRB 15 liters with improvement in saturation to 98% , patient uses accessory muscles for breathing, MS at baseline , repeat CXR still congested with mild improvement in right upper lung , ABG - ( 11 Mar 2022 06:18 ) pH, Arterial: 7.41 /  pCO2: 41  /  pO2: 122 / HCO3: 26  / Base Excess: 1.2  /  SaO2: 99,  bed side Lung Pocus showed predominant  diffuse B lines in b/l anterior lung fields, Bilateral pleural effusion R>L , A flap of floating atelectatic lung seen in right pleural effusion. IVC plum however compressible,  could not measure maximum diameter due to patients constant coughing , Lasix drip rate increased to 5ml/hour , patient placed on BI-PAP. Bladder scan showed 210cc retaining, pt endorses difficulty urinating, however refused Dupont. Pt transferred to ICU requiring new Bi-Pap.     Assessment and plan :    #Acute hypoxic respiratory failure 2/2 CHF exacerbation  #Atrial fibrillation  #LUANNE  #COPD  #HTN  #BPH    Neuro  intubated and sedated  plan to wean off sedation, SBT, and extubate (palliative or medical depending on how pt responds)    Cardio   #Acute hypoxic respiratory failure 2/2 CHF exacerbation  -p/w SOB  - Pt was hypoxic and tachypneic on NRB, was put on BiPAP for increased work of breathing   - Echo showed grossly severe global left ventricular systolic dysfunction, and b/l pleural effusions  - CXR shows worsening b/l pleural effusion   - switched Lasix to Bumex  - Strict I&Os   - daily weight    #Afib  -on Eliquis  and metoprolol   -witch to Heparin drip in case he needs procedure    #HTN  -c/w imdur and metoprolol     Pulm   #Acute hypoxic respiratory failure 2/2 CHF exacerbation  - cont lasix  - intubated and sedated  -Vent settings 12/420/5/40 sats 96%      #COPD  -c/w Albuterol inhaler prn  -on ceftriaxone and azithromycin day 4    GI   No active issues    Renal  #LUANNE  -Baseline Cr is normal  -Likely pre-renal LUANNE due to CHF  -c/w lasix  -Monitor BUN/Cr   - sodium bicarb drip    Endo  No active issues    ID  No active issues    Prophylaxis  Eliquis for DVT ppx    GOC  DNR, Trial of intubation   But the family does not want reintubation    89 years old male with history of HTN, BPH,latent TB and COPD, CHF who was brought to ED due to shortness of breath that started 2 weeks ago. Patient admitted on tele for acute on chronic CHF. Oxygen requirement increased over course of admission due to pulmonary edema , patient started on lasix drip 2.5 ml/hr, pt refused dupont , I&Os not fully documented , daily weight increased from 160 to 186, accuracy is questionable.  I was paged by nurse that patient is saturating in 80s on 6 L NC , placed on NRB 15 liters with improvement in saturation to 98% , patient uses accessory muscles for breathing, MS at baseline , repeat CXR still congested with mild improvement in right upper lung , ABG - ( 11 Mar 2022 06:18 ) pH, Arterial: 7.41 /  pCO2: 41  /  pO2: 122 / HCO3: 26  / Base Excess: 1.2  /  SaO2: 99,  bed side Lung Pocus showed predominant  diffuse B lines in b/l anterior lung fields, Bilateral pleural effusion R>L , A flap of floating atelectatic lung seen in right pleural effusion. IVC plum however compressible,  could not measure maximum diameter due to patients constant coughing , Lasix drip rate increased to 5ml/hour , patient placed on BI-PAP. Bladder scan showed 210cc retaining, pt endorses difficulty urinating, however refused Dupont. Pt transferred to ICU requiring new Bi-Pap.     Assessment and plan :    #Acute hypoxic respiratory failure 2/2 CHF exacerbation  #Atrial fibrillation  #LUANNE  #COPD  #HTN  #BPH    Neuro  intubated and sedated  plan to wean off sedation, SBT, and extubate (palliative or medical depending on how pt responds)    Cardio   #Acute hypoxic respiratory failure 2/2 CHF exacerbation  -p/w SOB  - Pt was hypoxic and tachypneic on NRB, was put on BiPAP for increased work of breathing   - Echo showed grossly severe global left ventricular systolic dysfunction, and b/l pleural effusions  - CXR shows worsening b/l pleural effusion   - switched Lasix to Bumex  - Strict I&Os   - daily weight    #Afib  -on Eliquis and metoprolol at home  -Hold Heparin drip for now d/t lack of aPTT  - restarted Metoprolol 12.5 bid  - monitor HR    #HTN  - Held imdur   - restarted metoprolol 12.5 bid    Pulm   #Acute hypoxic respiratory failure 2/2 CHF exacerbation  - Switched lasix to bumex  - intubated and sedated  - Vent settings 12/420/5/40 sats 96%    #COPD  -c/w Albuterol inhaler prn  -on ceftriaxone day 4    GI   No active issues    Renal  #LUANNE  -Baseline Cr is normal  -Likely pre-renal LUANNE due to CHF  -switched bumex to lasix  -Monitor BUN/Cr   - Strict Is & Os    Endo  No active issues    ID  No active issues    Prophylaxis  SCD for dvt ppx for now    GOC  DNR, Trial of intubation   But the family does not want reintubation

## 2022-03-14 NOTE — PROGRESS NOTE ADULT - SUBJECTIVE AND OBJECTIVE BOX
PATIENT SEEN AND EXAMINED ON :- 3/14/2022  DATE OF SERVICE:  3/14/2022           Interim events noted,Labs ,Radiological studies and Cardiology tests reviewed .     HOSPITAL COURSE: HPI:  Patient is 89 years old male with past medical history of past medical history of HTN, BPH,latent TB and COPD, CHF who was brought to ED due to shortness of breath that started 2 weeks ago. Patient reports that for the last two weeks, His shortness of breath was getting worse. Patient reports that he wasn't aware that he has heart problem. On physical exam, Patient has diffuse crackles throughout lungs and shortness of breathing when lying down. Patient is saturating 96% on 3L NC in ED. Patient denied chest pain, abdominal pain, N/V or shortness of breath.  (08 Mar 2022 03:29)      INTERIM EVENTS:Patient seen at bedside ,interim events noted.      PMH -reviewed admission note, no change since admission  HEART FAILURE: Acute[ ]Chronic[ ] Systolic[ ] Diastolic[ ] Combined Systolic and Diastolic[ ]  CAD[ ] CABG[ ] PCI[ ]  DEVICES[ ] PPM[ ] ICD[ ] ILR[ ]  ATRIAL FIBRILLATION[ ] Paroxysmal[ ] Permanent[ ]  LUANNE[ ] CKD1[ ] CKD2[ ] CKD3[ ] CKD4[ ] ESRD[ ]  COPD[ ] HTN[ ]   DM[ ] Type1[ ] Type 2[ ]   CVA[ ] Paresis[ ]    AMBULATION: Assisted[ ] Cane/walker[ ] Independent[ ]    MEDICATIONS  (STANDING):  cefTRIAXone   IVPB 1000 milliGRAM(s) IV Intermittent every 24 hours  chlorhexidine 2% Cloths 1 Application(s) Topical daily  fentaNYL   Infusion. 2 MICROgram(s)/kG/Hr (14.4 mL/Hr) IV Continuous <Continuous>  glycopyrrolate Injectable 0.2 milliGRAM(s) IV Push once  glycopyrrolate Injectable 0.4 milliGRAM(s) IV Push every 6 hours    MEDICATIONS  (PRN):  acetaminophen  Suppository .. 650 milliGRAM(s) Rectal every 6 hours PRN Temp greater or equal to 38C (100.4F)  bisacodyl Suppository 10 milliGRAM(s) Rectal daily PRN Constipation  LORazepam   Injectable 1 milliGRAM(s) IV Push every 2 hours PRN Agitation  morphine  - Injectable 2 milliGRAM(s) IV Push every 1 hour PRN dyspnea            REVIEW OF SYSTEMS:  Constitutional: [ ] fever, [ ]weight loss,  [ ]fatigue  Eyes: [ ] visual changes  Respiratory: [ ]shortness of breath;  [ ] cough, [ ]wheezing, [ ]chills, [ ]hemoptysis  Cardiovascular: [ ] chest pain, [ ]palpitations, [ ]dizziness,  [ ]leg swelling[ ]orthopnea[ ]PND  Gastrointestinal: [ ] abdominal pain, [ ]nausea, [ ]vomiting,  [ ]diarrhea [ ]Constipation [ ]Melena  Genitourinary: [ ] dysuria, [ ] hematuria [ ]Bucio  Neurologic: [ ] headaches [ ] tremors[ ]weakness [ ]Paralysis Right[ ] Left[ ]  Skin: [ ] itching, [ ]burning, [ ] rashes  Endocrine: [ ] heat or cold intolerance  Musculoskeletal: [ ] joint pain or swelling; [ ] muscle, back, or extremity pain  Psychiatric: [ ] depression, [ ]anxiety, [ ]mood swings, or [ ]difficulty sleeping  Hematologic: [ ] easy bruising, [ ] bleeding gums    [ ] All remaining systems negative except as per above.   [ ]Unable to obtain.  [x] No change in ROS since admission      Vital Signs Last 24 Hrs  T(C): 35.9 (14 Mar 2022 19:09), Max: 37.2 (14 Mar 2022 07:30)  T(F): 96.6 (14 Mar 2022 19:09), Max: 99 (14 Mar 2022 07:30)  HR: 134 (14 Mar 2022 20:00) (89 - 152)  BP: 95/75 (14 Mar 2022 20:00) (76/62 - 116/94)  BP(mean): 83 (14 Mar 2022 20:00) (66 - 102)  RR: 19 (14 Mar 2022 20:00) (11 - 28)  SpO2: 79% (14 Mar 2022 20:00) (79% - 98%)  I&O's Summary    13 Mar 2022 07:01  -  14 Mar 2022 07:00  --------------------------------------------------------  IN: 1823.4 mL / OUT: 920 mL / NET: 903.4 mL    14 Mar 2022 07:01  -  14 Mar 2022 22:00  --------------------------------------------------------  IN: 96.5 mL / OUT: 485 mL / NET: -388.5 mL        PHYSICAL EXAM:  General: No acute distress BMI-  HEENT: EOMI, PERRL  Neck: Supple, [ ] JVD  Lungs: Equal air entry bilaterally; [ ] rales [ ] wheezing [ ] rhonchi  Heart: Regular rate and rhythm; [x ] murmur   2/6 [ x] systolic [ ] diastolic [ ] radiation[ ] rubs [ ]  gallops  Abdomen: Nontender, bowel sounds present  Extremities: No clubbing, cyanosis, [ ] edema [ ]Pulses  equal and intact  Nervous system:  Alert & Oriented X3, no focal deficits  Psychiatric: Normal affect  Skin: No rashes or lesions    LABS:  03-14    139  |  100  |  70<H>  ----------------------------<  92  4.0   |  33<H>  |  2.59<H>    Ca    8.7      14 Mar 2022 04:30  Phos  4.0     03-14  Mg     2.3     03-14    TPro  5.5<L>  /  Alb  2.2<L>  /  TBili  2.2<H>  /  DBili  x   /  AST  111<H>  /  ALT  232<H>  /  AlkPhos  88  03-14    Creatinine Trend: 2.59<--, 2.62<--, 2.89<--, 2.80<--, 2.63<--, 2.24<--                        14.8   11.67 )-----------( 182      ( 14 Mar 2022 04:31 )             44.0     PTT - ( 14 Mar 2022 04:31 )  PTT:>200.0 sec      < from: Transthoracic Echocardiogram (03.09.22 @ 07:02) >  CONCLUSIONS:  1. Mild to moderate mitral regurgitation.  2.  Mild aortic regurgitation.  3. Moderate left ventricular enlargement.  4. Endocardium not well visualized; grossly severe global  left ventricular systolic dysfunction.  5. Right ventricular enlargement with decreased RV systolic  function.  (TAPSE 1.0 cm)  6. Small pericardial effusion.   No echocardiographic  evidence of tamponade physiology.  7. Bilateral pleural effusions.    < end of copied text >

## 2022-03-14 NOTE — CONSULT NOTE ADULT - CONVERSATION DETAILS
Spoke to daughter/health care proxy Zarnia Alvarez at bedside. She expresses that her goal of treatment is for comfort and for him to pass naturally and comfortably. Discussed in patient hospice, and family is interested. Spoke to daughter/health care proxy Zarina Alvarez at bedside. She expresses that her goal of treatment is for comfort and for him to pass naturally and comfortably, does not want him reintubated, DNR/DNI on file. Advised patient is appropriate for inpatient hospice for management of symptoms- dyspnea, secretions, end of life care. Agreeable for comfort measures only, no further labs or pressors,  continue antibiotics for now.  Prognosis could be anytime, but likely hours to days. Daughter in agreement for IPU referral. Support provided.    BETH updated for DNR/DNI, comfort measures only

## 2022-03-14 NOTE — CONSULT NOTE ADULT - PROBLEM SELECTOR RECOMMENDATION 9
currently on mechanical ventilator support  planned for extubation  management as per ICU team was on mechanical ventilator support  planned for medical extubation but no reintubation.   Robinul prn, ativan prn, morphine prn  management as per ICU team 2/2 CHF exacerbation, was on mechanical ventilator support  s/p extubation today,  no reintubation per daughter's wishes  Robinul prn secretions   ativan 1mg ivp every 2 h prn terminal agitation  dilaudid 0.5 mg ivp every 1 h prn dyspnea  transition to NC for comfort  management as per ICU team  IPU eligible, family in agreement. DNR/DNI.

## 2022-03-14 NOTE — CONSULT NOTE ADULT - ASSESSMENT
89 years old male with history of HTN, BPH,latent TB and COPD, CHF who was brought to ED due to shortness of breath that started 2 weeks prior to admission admitted for acute on chronic chf exacerbation, transferred to ICU for bipap use now on mechanical ventilator. 89 years old male with history of HTN, BPH,latent TB and COPD, CHF who was brought to ED due to shortness of breath that started 2 weeks prior to admission admitted for acute on chronic chf exacerbation, transferred to ICU for bipap use now on mechanical ventilator. s/p palliative wean today.

## 2022-03-14 NOTE — CONSULT NOTE ADULT - PROBLEM SELECTOR RECOMMENDATION 2
admitted for chf exacerbation now on ventilator support admitted for chf exacerbation with trial of intubation, extubated 3/14 admitted for chf exacerbation with trial of intubation, EF 20-25%, cardio following. No significant improvement w medical management. Hospice eligible. extubated 3/14. DNR/DNI.

## 2022-03-14 NOTE — CONSULT NOTE ADULT - ASSESSMENT
89 year old gentleman admitted for CHF exacerbation  -Attempts at placing catheter for strict I/O difficult; 16F coude catheter placed easily  -labs reviewed  -trend creatinine  -keep catheter until patient is out of intensive care unit and is stable at his baseline  -continue terazosin as outpatient. Ok for tamsulosin while patient is an inpatient  -Discussed with PA staff

## 2022-03-14 NOTE — PROGRESS NOTE ADULT - SUBJECTIVE AND OBJECTIVE BOX
INTERVAL HPI/OVERNIGHT EVENTS: ***    PRESSORS: [ ] YES [ ] NO  WHICH:      Antimicrobial:  cefTRIAXone   IVPB 1000 milliGRAM(s) IV Intermittent every 24 hours    Cardiovascular:  buMETAnide Injectable 2 milliGRAM(s) IV Push every 12 hours  norepinephrine Infusion 0.05 MICROgram(s)/kG/Min IV Continuous <Continuous>  tamsulosin 0.8 milliGRAM(s) Oral at bedtime    Pulmonary:  ALBUTerol    90 MICROgram(s) HFA Inhaler 2 Puff(s) Inhalation every 6 hours PRN  ipratropium 17 MICROgram(s) HFA Inhaler 1 Puff(s) Inhalation every 6 hours    Hematalogic:    Other:  acetaminophen     Tablet .. 650 milliGRAM(s) Oral every 6 hours PRN  chlorhexidine 0.12% Liquid 15 milliLiter(s) Oral Mucosa every 12 hours  chlorhexidine 2% Cloths 1 Application(s) Topical daily  fentaNYL   Infusion. 2 MICROgram(s)/kG/Hr IV Continuous <Continuous>  propofol Infusion 5 MICROgram(s)/kG/Min IV Continuous <Continuous>  simvastatin 10 milliGRAM(s) Oral at bedtime    acetaminophen     Tablet .. 650 milliGRAM(s) Oral every 6 hours PRN  ALBUTerol    90 MICROgram(s) HFA Inhaler 2 Puff(s) Inhalation every 6 hours PRN  buMETAnide Injectable 2 milliGRAM(s) IV Push every 12 hours  cefTRIAXone   IVPB 1000 milliGRAM(s) IV Intermittent every 24 hours  chlorhexidine 0.12% Liquid 15 milliLiter(s) Oral Mucosa every 12 hours  chlorhexidine 2% Cloths 1 Application(s) Topical daily  fentaNYL   Infusion. 2 MICROgram(s)/kG/Hr IV Continuous <Continuous>  ipratropium 17 MICROgram(s) HFA Inhaler 1 Puff(s) Inhalation every 6 hours  norepinephrine Infusion 0.05 MICROgram(s)/kG/Min IV Continuous <Continuous>  propofol Infusion 5 MICROgram(s)/kG/Min IV Continuous <Continuous>  simvastatin 10 milliGRAM(s) Oral at bedtime  tamsulosin 0.8 milliGRAM(s) Oral at bedtime    Drug Dosing Weight  Height (cm): 177.8 (11 Mar 2022 10:00)  Weight (kg): 71.8 (11 Mar 2022 16:32)  BMI (kg/m2): 22.7 (11 Mar 2022 16:32)  BSA (m2): 1.89 (11 Mar 2022 16:32)    CENTRAL LINE: [ ] YES [ ] NO  LOCATION:         LOVELACE: [ ] YES [ ] NO          A-LINE:  [ ] YES [ ] NO  LOCATION:             ICU Vital Signs Last 24 Hrs  T(C): 37.2 (14 Mar 2022 07:30), Max: 37.2 (14 Mar 2022 07:30)  T(F): 99 (14 Mar 2022 07:30), Max: 99 (14 Mar 2022 07:30)  HR: 109 (14 Mar 2022 12:00) (89 - 127)  BP: 87/64 (14 Mar 2022 12:00) (68/57 - 116/94)  BP(mean): 71 (14 Mar 2022 12:00) (59 - 102)  ABP: --  ABP(mean): --  RR: 28 (14 Mar 2022 12:00) (12 - 43)  SpO2: 92% (14 Mar 2022 12:00) (86% - 98%)      ABG - ( 14 Mar 2022 03:17 )  pH, Arterial: 7.47  pH, Blood: x     /  pCO2: 46    /  pO2: 97    / HCO3: 34    / Base Excess: 8.6   /  SaO2: 98                    03-13 @ 07:01  -  03-14 @ 07:00  --------------------------------------------------------  IN: 1823.4 mL / OUT: 920 mL / NET: 903.4 mL        Mode: AC/ CMV (Assist Control/ Continuous Mandatory Ventilation)  RR (machine): 12  TV (machine): 420  FiO2: 40  PEEP: 5  ITime: 1  MAP: 8  PIP: 20      REVIEW OF SYSTEMS:    CONSTITUTIONAL: No weakness, fevers or chills  NECK: No pain or stiffness  RESPIRATORY: No cough, wheezing, hemoptysis; No shortness of breath  CARDIOVASCULAR: No chest pain or palpitations  GASTROINTESTINAL: No abdominal or epigastric pain. No nausea, vomiting, No diarrhea or constipation. No melena or hematochezia.  GENITOURINARY: No dysuria, frequency or hematuria  NEUROLOGICAL: No numbness or weakness  All other review of systems is negative unless indicated above      PHYSICAL EXAM:    GENERAL: NAD, well-groomed, well-developed  EYES: EOMI, PERRLA,   NECK: Supple, No JVD; Normal thyroid; Trachea midline  NERVOUS SYSTEM:  Alert & Oriented X3,  Motor Strength 5/5 B/L upper and lower extremities; DTRs 2+ intact and symmetric  CHEST/LUNG: No rales, rhonchi, wheezing   HEART: Regular rate and rhythm; No murmurs,   ABDOMEN: Soft, Nontender, Nondistended; Bowel sounds present  EXTREMITIES:  2+ Peripheral Pulses, No clubbing, cyanosis, or edema        LABS:  CBC Full  -  ( 14 Mar 2022 04:31 )  WBC Count : 11.67 K/uL  RBC Count : 4.84 M/uL  Hemoglobin : 14.8 g/dL  Hematocrit : 44.0 %  Platelet Count - Automated : 182 K/uL  Mean Cell Volume : 90.9 fl  Mean Cell Hemoglobin : 30.6 pg  Mean Cell Hemoglobin Concentration : 33.6 gm/dL  Auto Neutrophil # : 10.26 K/uL  Auto Lymphocyte # : 0.68 K/uL  Auto Monocyte # : 0.61 K/uL  Auto Eosinophil # : 0.03 K/uL  Auto Basophil # : 0.02 K/uL  Auto Neutrophil % : 87.9 %  Auto Lymphocyte % : 5.8 %  Auto Monocyte % : 5.2 %  Auto Eosinophil % : 0.3 %  Auto Basophil % : 0.2 %    03-14    139  |  100  |  70<H>  ----------------------------<  92  4.0   |  33<H>  |  2.59<H>    Ca    8.7      14 Mar 2022 04:30  Phos  4.0     03-14  Mg     2.3     03-14    TPro  5.5<L>  /  Alb  2.2<L>  /  TBili  2.2<H>  /  DBili  x   /  AST  111<H>  /  ALT  232<H>  /  AlkPhos  88  03-14    PTT - ( 14 Mar 2022 04:31 )  PTT:>200.0 sec    Culture Results:   Numerous Serratia marcescens  Normal Respiratory Bhumi absent (03-11 @ 21:15)      RADIOLOGY & ADDITIONAL STUDIES REVIEWED:  ***    [ ]GOALS OF CARE DISCUSSION WITH PATIENT/FAMILY/PROXY:    CRITICAL CARE TIME SPENT: 35 minutes INTERVAL HPI/OVERNIGHT EVENTS: Pt is intubated and sedated with RASS of -4, unable to participate in ROS.    PRESSORS: [ ] YES [ ] NO  WHICH:      Antimicrobial:  cefTRIAXone   IVPB 1000 milliGRAM(s) IV Intermittent every 24 hours    Cardiovascular:  buMETAnide Injectable 2 milliGRAM(s) IV Push every 12 hours  norepinephrine Infusion 0.05 MICROgram(s)/kG/Min IV Continuous <Continuous>  tamsulosin 0.8 milliGRAM(s) Oral at bedtime    Pulmonary:  ALBUTerol    90 MICROgram(s) HFA Inhaler 2 Puff(s) Inhalation every 6 hours PRN  ipratropium 17 MICROgram(s) HFA Inhaler 1 Puff(s) Inhalation every 6 hours    Hematalogic:    Other:  acetaminophen     Tablet .. 650 milliGRAM(s) Oral every 6 hours PRN  chlorhexidine 0.12% Liquid 15 milliLiter(s) Oral Mucosa every 12 hours  chlorhexidine 2% Cloths 1 Application(s) Topical daily  fentaNYL   Infusion. 2 MICROgram(s)/kG/Hr IV Continuous <Continuous>  propofol Infusion 5 MICROgram(s)/kG/Min IV Continuous <Continuous>  simvastatin 10 milliGRAM(s) Oral at bedtime    acetaminophen     Tablet .. 650 milliGRAM(s) Oral every 6 hours PRN  ALBUTerol    90 MICROgram(s) HFA Inhaler 2 Puff(s) Inhalation every 6 hours PRN  buMETAnide Injectable 2 milliGRAM(s) IV Push every 12 hours  cefTRIAXone   IVPB 1000 milliGRAM(s) IV Intermittent every 24 hours  chlorhexidine 0.12% Liquid 15 milliLiter(s) Oral Mucosa every 12 hours  chlorhexidine 2% Cloths 1 Application(s) Topical daily  fentaNYL   Infusion. 2 MICROgram(s)/kG/Hr IV Continuous <Continuous>  ipratropium 17 MICROgram(s) HFA Inhaler 1 Puff(s) Inhalation every 6 hours  norepinephrine Infusion 0.05 MICROgram(s)/kG/Min IV Continuous <Continuous>  propofol Infusion 5 MICROgram(s)/kG/Min IV Continuous <Continuous>  simvastatin 10 milliGRAM(s) Oral at bedtime  tamsulosin 0.8 milliGRAM(s) Oral at bedtime    Drug Dosing Weight  Height (cm): 177.8 (11 Mar 2022 10:00)  Weight (kg): 71.8 (11 Mar 2022 16:32)  BMI (kg/m2): 22.7 (11 Mar 2022 16:32)  BSA (m2): 1.89 (11 Mar 2022 16:32)    CENTRAL LINE: [ ] YES [ ] NO  LOCATION:         LOVELACE: [ ] YES [ ] NO          A-LINE:  [ ] YES [ ] NO  LOCATION:             ICU Vital Signs Last 24 Hrs  T(C): 37.2 (14 Mar 2022 07:30), Max: 37.2 (14 Mar 2022 07:30)  T(F): 99 (14 Mar 2022 07:30), Max: 99 (14 Mar 2022 07:30)  HR: 109 (14 Mar 2022 12:00) (89 - 127)  BP: 87/64 (14 Mar 2022 12:00) (68/57 - 116/94)  BP(mean): 71 (14 Mar 2022 12:00) (59 - 102)  ABP: --  ABP(mean): --  RR: 28 (14 Mar 2022 12:00) (12 - 43)  SpO2: 92% (14 Mar 2022 12:00) (86% - 98%)      ABG - ( 14 Mar 2022 03:17 )  pH, Arterial: 7.47  pH, Blood: x     /  pCO2: 46    /  pO2: 97    / HCO3: 34    / Base Excess: 8.6   /  SaO2: 98                    03-13 @ 07:01  -  03-14 @ 07:00  --------------------------------------------------------  IN: 1823.4 mL / OUT: 920 mL / NET: 903.4 mL        Mode: AC/ CMV (Assist Control/ Continuous Mandatory Ventilation)  RR (machine): 12  TV (machine): 420  FiO2: 40  PEEP: 5  ITime: 1  MAP: 8  PIP: 20      REVIEW OF SYSTEMS:    as stated in hpi      PHYSICAL EXAM:    GENERAL: NAD, well-groomed, well-developed  EYES: EOMI, PERRLA,   NECK: Supple, No JVD; Normal thyroid; Trachea midline  NERVOUS SYSTEM:  Pt is intubated and sedated RAAS -4  CHEST/LUNG: course breath sounds b/l lobes  HEART: Regular rate and rhythm; No murmurs,   ABDOMEN: Soft, Nontender, Nondistended; Bowel sounds present  EXTREMITIES:  2+ Peripheral Pulses, No clubbing, cyanosis, or edema        LABS:  CBC Full  -  ( 14 Mar 2022 04:31 )  WBC Count : 11.67 K/uL  RBC Count : 4.84 M/uL  Hemoglobin : 14.8 g/dL  Hematocrit : 44.0 %  Platelet Count - Automated : 182 K/uL  Mean Cell Volume : 90.9 fl  Mean Cell Hemoglobin : 30.6 pg  Mean Cell Hemoglobin Concentration : 33.6 gm/dL  Auto Neutrophil # : 10.26 K/uL  Auto Lymphocyte # : 0.68 K/uL  Auto Monocyte # : 0.61 K/uL  Auto Eosinophil # : 0.03 K/uL  Auto Basophil # : 0.02 K/uL  Auto Neutrophil % : 87.9 %  Auto Lymphocyte % : 5.8 %  Auto Monocyte % : 5.2 %  Auto Eosinophil % : 0.3 %  Auto Basophil % : 0.2 %    03-14    139  |  100  |  70<H>  ----------------------------<  92  4.0   |  33<H>  |  2.59<H>    Ca    8.7      14 Mar 2022 04:30  Phos  4.0     03-14  Mg     2.3     03-14    TPro  5.5<L>  /  Alb  2.2<L>  /  TBili  2.2<H>  /  DBili  x   /  AST  111<H>  /  ALT  232<H>  /  AlkPhos  88  03-14    PTT - ( 14 Mar 2022 04:31 )  PTT:>200.0 sec    Culture Results:   Numerous Serratia marcescens  Normal Respiratory Bhumi absent (03-11 @ 21:15)      RADIOLOGY & ADDITIONAL STUDIES REVIEWED:  Yes    [ ]GOALS OF CARE DISCUSSION WITH PATIENT/FAMILY/PROXY:    CRITICAL CARE TIME SPENT: 35 minutes INTERVAL HPI/OVERNIGHT EVENTS: Pt is intubated and sedated with RASS of -4, unable to participate in ROS.    PRESSORS: [ ] YES [ ] NO  WHICH:      Antimicrobial:  cefTRIAXone   IVPB 1000 milliGRAM(s) IV Intermittent every 24 hours    Cardiovascular:  buMETAnide Injectable 2 milliGRAM(s) IV Push every 12 hours  norepinephrine Infusion 0.05 MICROgram(s)/kG/Min IV Continuous <Continuous>  tamsulosin 0.8 milliGRAM(s) Oral at bedtime    Pulmonary:  ALBUTerol    90 MICROgram(s) HFA Inhaler 2 Puff(s) Inhalation every 6 hours PRN  ipratropium 17 MICROgram(s) HFA Inhaler 1 Puff(s) Inhalation every 6 hours    Hematalogic:    Other:  acetaminophen     Tablet .. 650 milliGRAM(s) Oral every 6 hours PRN  chlorhexidine 0.12% Liquid 15 milliLiter(s) Oral Mucosa every 12 hours  chlorhexidine 2% Cloths 1 Application(s) Topical daily  fentaNYL   Infusion. 2 MICROgram(s)/kG/Hr IV Continuous <Continuous>  propofol Infusion 5 MICROgram(s)/kG/Min IV Continuous <Continuous>  simvastatin 10 milliGRAM(s) Oral at bedtime    acetaminophen     Tablet .. 650 milliGRAM(s) Oral every 6 hours PRN  ALBUTerol    90 MICROgram(s) HFA Inhaler 2 Puff(s) Inhalation every 6 hours PRN  buMETAnide Injectable 2 milliGRAM(s) IV Push every 12 hours  cefTRIAXone   IVPB 1000 milliGRAM(s) IV Intermittent every 24 hours  chlorhexidine 0.12% Liquid 15 milliLiter(s) Oral Mucosa every 12 hours  chlorhexidine 2% Cloths 1 Application(s) Topical daily  fentaNYL   Infusion. 2 MICROgram(s)/kG/Hr IV Continuous <Continuous>  ipratropium 17 MICROgram(s) HFA Inhaler 1 Puff(s) Inhalation every 6 hours  norepinephrine Infusion 0.05 MICROgram(s)/kG/Min IV Continuous <Continuous>  propofol Infusion 5 MICROgram(s)/kG/Min IV Continuous <Continuous>  simvastatin 10 milliGRAM(s) Oral at bedtime  tamsulosin 0.8 milliGRAM(s) Oral at bedtime    Drug Dosing Weight  Height (cm): 177.8 (11 Mar 2022 10:00)  Weight (kg): 71.8 (11 Mar 2022 16:32)  BMI (kg/m2): 22.7 (11 Mar 2022 16:32)  BSA (m2): 1.89 (11 Mar 2022 16:32)    CENTRAL LINE: [ ] YES [ ] NO  LOCATION:         LOVELACE: [ ] YES [ ] NO          A-LINE:  [ ] YES [ ] NO  LOCATION:             ICU Vital Signs Last 24 Hrs  T(C): 37.2 (14 Mar 2022 07:30), Max: 37.2 (14 Mar 2022 07:30)  T(F): 99 (14 Mar 2022 07:30), Max: 99 (14 Mar 2022 07:30)  HR: 109 (14 Mar 2022 12:00) (89 - 127)  BP: 87/64 (14 Mar 2022 12:00) (68/57 - 116/94)  BP(mean): 71 (14 Mar 2022 12:00) (59 - 102)  ABP: --  ABP(mean): --  RR: 28 (14 Mar 2022 12:00) (12 - 43)  SpO2: 92% (14 Mar 2022 12:00) (86% - 98%)      ABG - ( 14 Mar 2022 03:17 )  pH, Arterial: 7.47  pH, Blood: x     /  pCO2: 46    /  pO2: 97    / HCO3: 34    / Base Excess: 8.6   /  SaO2: 98                    03-13 @ 07:01  -  03-14 @ 07:00  --------------------------------------------------------  IN: 1823.4 mL / OUT: 920 mL / NET: 903.4 mL        Mode: AC/ CMV (Assist Control/ Continuous Mandatory Ventilation)  RR (machine): 12  TV (machine): 420  FiO2: 40  PEEP: 5  ITime: 1  MAP: 8  PIP: 20      REVIEW OF SYSTEMS:    as stated in hpi      PHYSICAL EXAM:    GENERAL: NAD, well-groomed, well-developed  EYES: PERRL  NECK: Supple, No JVD; Normal thyroid; Trachea midline  NERVOUS SYSTEM:  Pt is intubated and sedated RAAS -4  CHEST/LUNG: course breath sounds b/l lobes  HEART: Regular rate and rhythm; No murmurs,   ABDOMEN: Soft, Nontender, Nondistended; Bowel sounds present  EXTREMITIES:  2+ Peripheral Pulses, No clubbing, cyanosis, or edema        LABS:  CBC Full  -  ( 14 Mar 2022 04:31 )  WBC Count : 11.67 K/uL  RBC Count : 4.84 M/uL  Hemoglobin : 14.8 g/dL  Hematocrit : 44.0 %  Platelet Count - Automated : 182 K/uL  Mean Cell Volume : 90.9 fl  Mean Cell Hemoglobin : 30.6 pg  Mean Cell Hemoglobin Concentration : 33.6 gm/dL  Auto Neutrophil # : 10.26 K/uL  Auto Lymphocyte # : 0.68 K/uL  Auto Monocyte # : 0.61 K/uL  Auto Eosinophil # : 0.03 K/uL  Auto Basophil # : 0.02 K/uL  Auto Neutrophil % : 87.9 %  Auto Lymphocyte % : 5.8 %  Auto Monocyte % : 5.2 %  Auto Eosinophil % : 0.3 %  Auto Basophil % : 0.2 %    03-14    139  |  100  |  70<H>  ----------------------------<  92  4.0   |  33<H>  |  2.59<H>    Ca    8.7      14 Mar 2022 04:30  Phos  4.0     03-14  Mg     2.3     03-14    TPro  5.5<L>  /  Alb  2.2<L>  /  TBili  2.2<H>  /  DBili  x   /  AST  111<H>  /  ALT  232<H>  /  AlkPhos  88  03-14    PTT - ( 14 Mar 2022 04:31 )  PTT:>200.0 sec    Culture Results:   Numerous Serratia marcescens  Normal Respiratory Bhumi absent (03-11 @ 21:15)      RADIOLOGY & ADDITIONAL STUDIES REVIEWED:  Yes    [ ]GOALS OF CARE DISCUSSION WITH PATIENT/FAMILY/PROXY:    CRITICAL CARE TIME SPENT: 35 minutes

## 2022-03-14 NOTE — GOALS OF CARE CONVERSATION - ADVANCED CARE PLANNING - CONVERSATION DETAILS
Spoke with patient's daughter Zarina at bedside. Discussed patient's current status and prognosis. Will hold sedation and attempt SBT. If SBT tolerated, will extubate with no plan to re-intubate. If patient cannot tolerate SBT, the family prefers for withdrawal of care, palliative extubation, and comfort measures. Will wait for family to return to bedside prior to extubation. Palliative consulted for assistance in palliative extubation. Explicitly discussed- no more lab draws, will stop heparin gtt. Will continue other medications including antibiotics for now. All other questions answered. DNR and do not reintubate.

## 2022-03-14 NOTE — CHART NOTE - NSCHARTNOTEFT_GEN_A_CORE
Spoke with patient's daughter Zarina at bedside. Discussed patient's current status and prognosis. Will hold sedation and attempt SBT. If SBT tolerated, will extubate with no plan to re-intubate. If patient cannot tolerate SBT, the family prefers for withdrawal of care, palliative extubation, and comfort measures. Will wait for family to return to bedside prior to extubation. Palliative consulted for assistance in palliative extubation. Explicitly discussed- no more lab draws, will stop heparin gtt. Will continue other medications including antibiotics for now. All other questions answered.

## 2022-03-14 NOTE — PROGRESS NOTE ADULT - ASSESSMENT
89 years old male with history of HTN, BPH,latent TB and COPD, CHF who was brought to ED due to shortness of breath that started 2 weeks ago. Patient admitted on tele for acute on chronic CHF. Oxygen requirement increased over course of admission due to pulmonary edema , patient started on lasix drip 2.5 ml/hr, pt refused dupont , I&Os not fully documented , daily weight increased from 160 to 186, accuracy is questionable.  I was paged by nurse that patient is saturating in 80s on 6 L NC , placed on NRB 15 liters with improvement in saturation to 98% , patient uses accessory muscles for breathing, MS at baseline , repeat CXR still congested with mild improvement in right upper lung , ABG - ( 11 Mar 2022 06:18 ) pH, Arterial: 7.41 /  pCO2: 41  /  pO2: 122 / HCO3: 26  / Base Excess: 1.2  /  SaO2: 99,  bed side Lung Pocus showed predominant  diffuse B lines in b/l anterior lung fields, Bilateral pleural effusion R>L , A flap of floating atelectatic lung seen in right pleural effusion. IVC plum however compressible,  could not measure maximum diameter due to patients constant coughing , Lasix drip rate increased to 5ml/hour , patient placed on BI-PAP. Bladder scan showed 210cc retaining, pt endorses difficulty urinating, however refused Dupont. Pt transferred to ICU requiring new Bi-Pap.     Assessment and plan :    #Acute hypoxic respiratory failure 2/2 CHF exacerbation  #Atrial fibrillation  #LUANNE  #COPD  #HTN  #BPH    Neuro  intubated and sedated  plan to wean off sedation, SBT, and extubate (palliative or medical depending on how pt responds)    Cardio   #Acute hypoxic respiratory failure 2/2 CHF exacerbation  -p/w SOB  - Pt was hypoxic and tachypneic on NRB, was put on BiPAP for increased work of breathing   - Echo showed grossly severe global left ventricular systolic dysfunction, and b/l pleural effusions  - CXR shows worsening b/l pleural effusion   - switched Lasix to Bumex  - Strict I&Os   - daily weight    #Afib  -on Eliquis and metoprolol at home  -Hold Heparin drip for now d/t lack of aPTT  - restarted Metoprolol 12.5 bid  - monitor HR    #HTN  - Held imdur   - restarted metoprolol 12.5 bid    Pulm   #Acute hypoxic respiratory failure 2/2 CHF exacerbation  - Switched lasix to bumex  - intubated and sedated  - Vent settings 12/420/5/40 sats 96%    #COPD  -c/w Albuterol inhaler prn  -on ceftriaxone day 4    GI   No active issues    Renal  #LUANNE  -Baseline Cr is normal  -Likely pre-renal LUANNE due to CHF  -switched bumex to lasix  -Monitor BUN/Cr   - Strict Is & Os    Endo  No active issues    ID  No active issues    Prophylaxis  SCD for dvt ppx for now

## 2022-03-14 NOTE — CONSULT NOTE ADULT - SUBJECTIVE AND OBJECTIVE BOX
89 years old male with past medical history of HTN, BPH, latent TB and COPD, CHF who was brought to ED due to shortness of breath that started 2 weeks ago. Patient reports that for the last two weeks, his shortness of breath has worsened. Patient denied chest pain, abdominal pain, N/V or shortness of breath. Lives in assisted living facility. No specific timing to his symptoms. No aggravating or alleviating factors that he knows of. No difficulty urinating on terazosin.    Allergies and Intolerances:        Allergies:  	No Known Allergies:     Home Medications:   * Patient Currently Takes Medications as of 27-Jan-2020 11:35 documented in Structured Notes  · 	home physical therapy : home physical therapy for strength   · 	azithromycin 500 mg oral tablet: 1 tab(s) orally once a day   · 	cefuroxime 500 mg oral tablet: 1 tab(s) orally 2 times a day   · 	terazosin 5 mg oral capsule: 1 cap(s) orally once a day (at bedtime)  · 	simvastatin 10 mg oral tablet: 1 tab(s) orally once a day (at bedtime)  · 	Lasix 40 mg oral tablet: 1 tab(s) orally once a day  · 	Metoprolol Tartrate 25 mg oral tablet: 1 tab(s) orally once a day  · 	Pepcid 20 mg oral tablet: 1 tab(s) orally once a day  · 	Vitamin D3 1000 intl units (25 mcg) oral tablet: 1 tab(s) orally once a day  · 	Omega-3 1000 mg oral capsule: 1 cap(s) orally every other day  · 	Imdur 30 mg oral tablet, extended release: 1 tab(s) orally once a day (in the morning)  · 	aspirin 81 mg oral tablet: 1 tab(s) orally once a day  · 	Caltrate 600 mg oral tablet: 2 tab(s) orally once a day  · 	Tylenol 500 mg oral tablet: 2 tab(s) orally 2 times a day, As Needed  · 	Metoprolol Tartrate 50 mg oral tablet: Last Dose Taken:  , 1 tab(s) orally 2 times a day    Patient History:    Past Medical, Past Surgical, and Family History:  PAST MEDICAL HISTORY:  BPH (benign prostatic hyperplasia)     History of COPD     History of hypertension     Hypercholesterolemia.     Social History:  Social History (marital status, living situation, occupation, tobacco use, alcohol and drug use, and sexual history): Patient is from assisted living, non smoker or alcohol hx    Family History: No family history of  malignancy    ROS: All others negative except as noted above.    ICU Vital Signs Last 24 Hrs  T(C): 37.2 (14 Mar 2022 07:30), Max: 37.2 (14 Mar 2022 07:30)  T(F): 99 (14 Mar 2022 07:30), Max: 99 (14 Mar 2022 07:30)  HR: 127 (14 Mar 2022 10:00) (95 - 127)  BP: 98/81 (14 Mar 2022 10:00) (68/57 - 136/120)  BP(mean): 88 (14 Mar 2022 10:00) (58 - 125)  ABP: --  ABP(mean): --  RR: 19 (14 Mar 2022 10:00) (12 - 43)  SpO2: 97% (14 Mar 2022 10:00) (86% - 98%)                          14.8   11.67 )-----------( 182      ( 14 Mar 2022 04:31 )             44.0     03-14    139  |  100  |  70<H>  ----------------------------<  92  4.0   |  33<H>  |  2.59<H>    Ca    8.7      14 Mar 2022 04:30  Phos  4.0     03-14  Mg     2.3     03-14    TPro  5.5<L>  /  Alb  2.2<L>  /  TBili  2.2<H>  /  DBili  x   /  AST  111<H>  /  ALT  232<H>  /  AlkPhos  88  03-14

## 2022-03-15 NOTE — PROGRESS NOTE ADULT - ATTENDING COMMENTS
IMP: This is a 89 years old white man  with  HTN, BPH,latent TB and COPD, CHF who was brought to ED due to shortness of breath that started 2 weeks ago. Patient admitted on tele for acute on chronic CHF. Oxygen requirement increased over course of admission due to pulmonary edema , patient started on lasix drip 2.5 ml/hr, pt refused dupont , I&Os not fully documented , daily weight increased from 160 to 186, accuracy is questionable.  Pat started on BiPaP for resp distress and increase work of bleeding      Assessment     - Acute hypoxic respiratory failure 2/2 CHF exacerbation  - Pulmonary edema   - Atrial fibrillation  - LUANNE  - COPD   - HTN  - BPH      Plan     - Intubated 3/11 after failing BiPaP   - Combivent neb   - Hemodynamic monitoring   - Change lasix gtt to IVP  keep neg fluid balance   - Mucomyst prn   - NGT   - Troponin trending up   - CXR with increase pleural effusion   - Continue anticoag   - Monitor BP with meds  - Daughter at bedside wish for trial of intubation x 2-3 days.
IMP: This is a 89 years old white man  with  HTN, BPH,latent TB and COPD, CHF who was brought to ED due to shortness of breath that started 2 weeks ago. Patient admitted on tele for acute on chronic CHF. Oxygen requirement increased over course of admission due to pulmonary edema , patient started on lasix drip 2.5 ml/hr, pt refused dupont , I&Os not fully documented , daily weight increased from 160 to 186, accuracy is questionable.  Pat started on BiPaP for resp distress and increase work of bleeding      Assessment     - Acute hypoxic respiratory failure 2/2 CHF exacerbation  - Pulmonary edema   - Atrial fibrillation  - LUANNE  - COPD   - HTN  - BPH      Plan     - Intubated 3/11 after failing BiPaP   - Combivent neb   - Hemodynamic monitoring   - Change lasix gtt to IVP  keep neg fluid balance   - Mucomyst prn   - NGT   - Troponin trending up   - CXR with increase pleural effusion   - Continue anticoag   - Monitor BP with meds  - Daughter at bedside wish for trial of intubation x 2-3 days.  - Palliative eval
IMP: This is a 89 years old white man  with  HTN, BPH,latent TB and COPD, CHF who was brought to ED due to shortness of breath that started 2 weeks ago. Patient admitted on tele for acute on chronic CHF. Oxygen requirement increased over course of admission due to pulmonary edema , patient started on lasix drip 2.5 ml/hr, pt refused dupont , I&Os not fully documented , daily weight increased from 160 to 186, accuracy is questionable.  Pat started on BiPaP for resp distress and increase work of bleeding      Assessment   - Acute hypoxic respiratory failure 2/2 CHF exacerbation  - Pulmonary edema   - Atrial fibrillation  - LUANNE  - COPD   - HTN  - BPH      Plan:  - Intubated 3/11  - Cont. mechanical ventilation  - Sedation vacation and weaning trial   - Combivent neb   - Hemodynamic monitoring   - Change lasix to bumex and aim for negative fluid balance   - NGT feedings  - CXR with increase pleural effusion   - Continue anticoag   - Monitor BP with meds  - Palliative eval  - Family wants to wean off vent today, dnr with no reintubation
IMP: This is a 89 years old white man  with  HTN, BPH,latent TB and COPD, CHF who was brought to ED due to shortness of breath that started 2 weeks ago. Patient admitted on tele for acute on chronic CHF. Oxygen requirement increased over course of admission due to pulmonary edema , patient started on lasix drip 2.5 ml/hr, pt refused dupont , I&Os not fully documented , daily weight increased from 160 to 186, accuracy is questionable.  Pat started on BiPaP for resp distress and increase work of bleeding      Assessment   - Acute hypoxic respiratory failure 2/2 CHF exacerbation  - Pulmonary edema   - Atrial fibrillation  - LUANNE  - COPD   - HTN  - BPH      Plan:  - Palliative wean off the ventilator 3/14   - Comfort measures only at this time   - Palliative care follow up  - D/c NGT   - Cont. kiah for comfort  - Transfer out of iCU today
discussed management plan with house staff
discussed management plan with house staff

## 2022-03-15 NOTE — PATIENT PROFILE ADULT - FALL HARM RISK - HARM RISK INTERVENTIONS
Assistance with ambulation/Assistance OOB with selected safe patient handling equipment/Communicate Risk of Fall with Harm to all staff/Discuss with provider need for PT consult/Monitor gait and stability/Reinforce activity limits and safety measures with patient and family/Tailored Fall Risk Interventions/Visual Cue: Yellow wristband and red socks/Bed in lowest position, wheels locked, appropriate side rails in place/Call bell, personal items and telephone in reach/Instruct patient to call for assistance before getting out of bed or chair/Non-slip footwear when patient is out of bed/[place] to call system/Physically safe environment - no spills, clutter or unnecessary equipment/Purposeful Proactive Rounding/Room/bathroom lighting operational, light cord in reach

## 2022-03-15 NOTE — H&P ADULT - PROBLEM SELECTOR PLAN 1
due to worsening CHF and PNA   patient wanted DNR but trial of intubation, failed bipap and was intubated on 3/11  no improvement despite all efforts  family decided on palliative extubation on 3/14 with focus on supportive care and symptom management  dilaudid 0.5mg IVP PRN, c/w oxygen via NC

## 2022-03-15 NOTE — H&P ADULT - NSHPPHYSICALEXAM_GEN_ALL_CORE
Vital Signs Last 24 Hrs  T(C): 36.2 (15 Mar 2022 18:27), Max: 36.2 (15 Mar 2022 18:27)  T(F): 97.1 (15 Mar 2022 18:27), Max: 97.1 (15 Mar 2022 18:27)  HR: 115 (15 Mar 2022 18:27) (108 - 148)  BP: 92/60 (15 Mar 2022 18:27) (72/49 - 95/75)  BP(mean): 58 (15 Mar 2022 16:00) (58 - 83)  RR: 12 (15 Mar 2022 18:27) (11 - 45)  SpO2: 93% (15 Mar 2022 18:27) (79% - 93%)    Palliative Performance Scale/Karnofsky Score: 10-15%  cachectic, no respiratory distress  HEENT: no abnormal lesions, dry mouth    Lungs: (+) secretions present, shallow breathing  CV: RRR, S1S2  GI: soft non distended non tender   : incontinent    dupont  Musculoskeletal: weakness x4 edema x4    Skin: no abnormal skin lesions, poor skin turgor,   Neuro: unarousable pinpoint pupils  Oral intake ability: unable/only mouth care

## 2022-03-15 NOTE — PROGRESS NOTE ADULT - ASSESSMENT
89 years old male with history of HTN, BPH,latent TB and COPD, CHF who was brought to ED due to shortness of breath that started 2 weeks ago. Patient admitted on tele for acute on chronic CHF. Oxygen requirement increased over course of admission due to pulmonary edema , patient started on lasix drip 2.5 ml/hr, pt refused dupont , I&Os not fully documented , daily weight increased from 160 to 186, accuracy is questionable.  I was paged by nurse that patient is saturating in 80s on 6 L NC , placed on NRB 15 liters with improvement in saturation to 98% , patient uses accessory muscles for breathing, MS at baseline , repeat CXR still congested with mild improvement in right upper lung , ABG - ( 11 Mar 2022 06:18 ) pH, Arterial: 7.41 /  pCO2: 41  /  pO2: 122 / HCO3: 26  / Base Excess: 1.2  /  SaO2: 99,  bed side Lung Pocus showed predominant  diffuse B lines in b/l anterior lung fields, Bilateral pleural effusion R>L , A flap of floating atelectatic lung seen in right pleural effusion. IVC plum however compressible,  could not measure maximum diameter due to patients constant coughing , Lasix drip rate increased to 5ml/hour , patient placed on BI-PAP. Bladder scan showed 210cc retaining, pt endorses difficulty urinating, however refused Dupont. Pt transferred to ICU requiring new Bi-Pap.     Assessment and plan :    #Acute hypoxic respiratory failure 2/2 CHF exacerbation  #Atrial fibrillation  #LUANNE  #COPD  #HTN  #BPH    Neuro  intubated and sedated  plan to wean off sedation, SBT, and extubate (palliative or medical depending on how pt responds)    Cardio   #Acute hypoxic respiratory failure 2/2 CHF exacerbation  - venti mask   - Echo showed grossly severe global left ventricular systolic dysfunction, and b/l pleural effusions  - CXR shows worsening b/l pleural effusion   - comfort measures only    #Afib  - on Eliquis and metoprolol at home  - Hold Heparin drip for now d/t lack of aPTT  - comfort measures only    #HTN  - Held imdur and metoprolol  - comfort measures only    Pulm   #Acute hypoxic respiratory failure 2/2 CHF exacerbation  - on venti mask  - comfort measures only    #COPD  -c/w Albuterol inhaler prn  -on ceftriaxone day 5    GI   No active issues    Renal  #LUANNE  - Baseline Cr is normal  - Likely pre-renal LUANNE due to CHF  - comfort measures only    Endo  No active issues    ID  No active issues    Prophylaxis  SCD for dvt ppx for now    GOC  comfort measures only

## 2022-03-15 NOTE — DISCHARGE NOTE PROVIDER - CARE PROVIDER_API CALL
Lisa Irby (DO)  Family Medicine; [facility]  [phone] [place], [place] [address]  Phone: [phone]  Fax: [phone]  Follow Up Time: 1-3 days

## 2022-03-15 NOTE — H&P ADULT - HISTORY OF PRESENT ILLNESS
89 years old male with past medical history of past medical history of HTN, BPH,latent TB and COPD, CHF who was brought to ED due to worsening SOB of two weeks .      Patient reports that he wasn't aware that he has heart problem.     ED COURSE:  Patient is saturating 96% on 3L NC in ED. And had crackles on exam  Labs showed flat trops at 500 and Pro BNP at 11K  Patient was admitted for CHF exacerbation requiring BIPAP     On 3/10 patient had sudden onset desaturation and worsening SOB. Patient had chest xray that showed worsening congestion. Patient was started on Lasix drip. ECHO showed severe LV dysfunction. Pt was tachypneic overnight and was placed on NRB, ICU was consulted and placed on Bipap. Transferred to ICU.    ICU COURSE   In the ICU patient was also noted to have severely productive cough with rhonchi on auscultation, with which patient became increasingly more hypoxic. Pt required intubation and sedation. Pt's AHRF was likely secondary to CHF exacerbation and PNA. Pt was treated with lasix drip, which was switched to 60IV BID. Pt's urine output was not responding well to the lasix. Pt was switched to Bumex for diuresis. Pt was covered with Azithro and ceftriaxone for PNA. Legionella, Mycoplasma and RVP were neg. Azithromycin was discontinued. Pt's family had conversations with palliative care. Patient's family felt it aligned with the patient's wishes to do a palliative extubation with no reintubation, should the patient fail the extubation. Pt was started on SBT, and patient was extubated. Pt's work of breathing was elevated after extubation as seen with accessory muscle use. Pt was given morphine and glycopyrolate. Pt's family opted to place patient on comfort care only after further discussion with palliative. Eligible for inpatient hospice

## 2022-03-15 NOTE — H&P ADULT - PROBLEM SELECTOR PLAN 2
with CKD worsening LUANNE, bilateral pleural effusions  20-25% ED with severe global LV dysfunction  s/p lasix and ICU management but worsening clinically  supportive care only now

## 2022-03-15 NOTE — PROGRESS NOTE ADULT - ASSESSMENT
89 years old male with history of HTN, BPH,latent TB and COPD, CHF who was brought to ED due to shortness of breath that started 2 weeks prior to admission admitted for acute on chronic chf exacerbation, transferred to ICU for bipap use was on mechanical ventilator, extubated 3/14. Now comfort measures only.

## 2022-03-15 NOTE — H&P ADULT - PROBLEM SELECTOR PLAN 3
acute respiratory failure in setting of COPD and latent TB  s/p extubation  supportive only now  manage secretions, keep NPO

## 2022-03-15 NOTE — PROGRESS NOTE ADULT - SUBJECTIVE AND OBJECTIVE BOX
INTERVAL HPI/OVERNIGHT EVENTS: ***    PRESSORS: [ ] YES [ ] NO  WHICH:      Antimicrobial:  cefTRIAXone   IVPB 1000 milliGRAM(s) IV Intermittent every 24 hours    Cardiovascular:    Pulmonary:    Hematalogic:    Other:  acetaminophen  Suppository .. 650 milliGRAM(s) Rectal every 6 hours PRN  bisacodyl Suppository 10 milliGRAM(s) Rectal daily PRN  chlorhexidine 2% Cloths 1 Application(s) Topical daily  fentaNYL   Infusion. 2 MICROgram(s)/kG/Hr IV Continuous <Continuous>  glycopyrrolate Injectable 0.2 milliGRAM(s) IV Push once  glycopyrrolate Injectable 0.4 milliGRAM(s) IV Push every 6 hours  LORazepam   Injectable 1 milliGRAM(s) IV Push every 2 hours PRN  morphine  - Injectable 2 milliGRAM(s) IV Push every 1 hour PRN    acetaminophen  Suppository .. 650 milliGRAM(s) Rectal every 6 hours PRN  bisacodyl Suppository 10 milliGRAM(s) Rectal daily PRN  cefTRIAXone   IVPB 1000 milliGRAM(s) IV Intermittent every 24 hours  chlorhexidine 2% Cloths 1 Application(s) Topical daily  fentaNYL   Infusion. 2 MICROgram(s)/kG/Hr IV Continuous <Continuous>  glycopyrrolate Injectable 0.2 milliGRAM(s) IV Push once  glycopyrrolate Injectable 0.4 milliGRAM(s) IV Push every 6 hours  LORazepam   Injectable 1 milliGRAM(s) IV Push every 2 hours PRN  morphine  - Injectable 2 milliGRAM(s) IV Push every 1 hour PRN    Drug Dosing Weight  Height (cm): 177.8 (11 Mar 2022 10:00)  Weight (kg): 71.8 (11 Mar 2022 16:32)  BMI (kg/m2): 22.7 (11 Mar 2022 16:32)  BSA (m2): 1.89 (11 Mar 2022 16:32)    CENTRAL LINE: [ ] YES [ ] NO  LOCATION:         LOVELACE: [ ] YES [ ] NO          A-LINE:  [ ] YES [ ] NO  LOCATION:             ICU Vital Signs Last 24 Hrs  T(C): 35.9 (15 Mar 2022 08:00), Max: 36.4 (14 Mar 2022 17:21)  T(F): 96.7 (15 Mar 2022 08:00), Max: 97.6 (14 Mar 2022 17:21)  HR: 114 (15 Mar 2022 08:00) (89 - 152)  BP: 81/53 (15 Mar 2022 08:00) (81/53 - 116/94)  BP(mean): 62 (15 Mar 2022 08:00) (60 - 102)  ABP: --  ABP(mean): --  RR: 19 (15 Mar 2022 08:00) (11 - 28)  SpO2: 92% (15 Mar 2022 08:00) (79% - 96%)      ABG - ( 14 Mar 2022 03:17 )  pH, Arterial: 7.47  pH, Blood: x     /  pCO2: 46    /  pO2: 97    / HCO3: 34    / Base Excess: 8.6   /  SaO2: 98                    03-14 @ 07:01  -  03-15 @ 07:00  --------------------------------------------------------  IN: 96.5 mL / OUT: 485 mL / NET: -388.5 mL        Mode: CPAP with PS  FiO2: 40  PEEP: 5  PS: 8  ITime: 1  MAP: 7  PIP: 15      REVIEW OF SYSTEMS:    CONSTITUTIONAL: No weakness, fevers or chills  NECK: No pain or stiffness  RESPIRATORY: No cough, wheezing, hemoptysis; No shortness of breath  CARDIOVASCULAR: No chest pain or palpitations  GASTROINTESTINAL: No abdominal or epigastric pain. No nausea, vomiting, No diarrhea or constipation. No melena or hematochezia.  GENITOURINARY: No dysuria, frequency or hematuria  NEUROLOGICAL: No numbness or weakness  All other review of systems is negative unless indicated above      PHYSICAL EXAM:    GENERAL: NAD, well-groomed, well-developed  EYES: EOMI, PERRLA,   NECK: Supple, No JVD; Normal thyroid; Trachea midline  NERVOUS SYSTEM:  Alert & Oriented X3,  Motor Strength 5/5 B/L upper and lower extremities; DTRs 2+ intact and symmetric  CHEST/LUNG: No rales, rhonchi, wheezing   HEART: Regular rate and rhythm; No murmurs,   ABDOMEN: Soft, Nontender, Nondistended; Bowel sounds present  EXTREMITIES:  2+ Peripheral Pulses, No clubbing, cyanosis, or edema        LABS:  CBC Full  -  ( 14 Mar 2022 04:31 )  WBC Count : 11.67 K/uL  RBC Count : 4.84 M/uL  Hemoglobin : 14.8 g/dL  Hematocrit : 44.0 %  Platelet Count - Automated : 182 K/uL  Mean Cell Volume : 90.9 fl  Mean Cell Hemoglobin : 30.6 pg  Mean Cell Hemoglobin Concentration : 33.6 gm/dL  Auto Neutrophil # : 10.26 K/uL  Auto Lymphocyte # : 0.68 K/uL  Auto Monocyte # : 0.61 K/uL  Auto Eosinophil # : 0.03 K/uL  Auto Basophil # : 0.02 K/uL  Auto Neutrophil % : 87.9 %  Auto Lymphocyte % : 5.8 %  Auto Monocyte % : 5.2 %  Auto Eosinophil % : 0.3 %  Auto Basophil % : 0.2 %    03-14    139  |  100  |  70<H>  ----------------------------<  92  4.0   |  33<H>  |  2.59<H>    Ca    8.7      14 Mar 2022 04:30  Phos  4.0     03-14  Mg     2.3     03-14    TPro  5.5<L>  /  Alb  2.2<L>  /  TBili  2.2<H>  /  DBili  x   /  AST  111<H>  /  ALT  232<H>  /  AlkPhos  88  03-14    PTT - ( 14 Mar 2022 04:31 )  PTT:>200.0 sec        RADIOLOGY & ADDITIONAL STUDIES REVIEWED:  ***    [ ]GOALS OF CARE DISCUSSION WITH PATIENT/FAMILY/PROXY:    CRITICAL CARE TIME SPENT: 35 minutes INTERVAL HPI/OVERNIGHT EVENTS: Pt is lying comfortably in bed. Pt is not able to participate in ROS d/t MS.     PRESSORS: [ ] YES [ ] NO  WHICH:      Antimicrobial:  cefTRIAXone   IVPB 1000 milliGRAM(s) IV Intermittent every 24 hours    Cardiovascular:    Pulmonary:    Hematalogic:    Other:  acetaminophen  Suppository .. 650 milliGRAM(s) Rectal every 6 hours PRN  bisacodyl Suppository 10 milliGRAM(s) Rectal daily PRN  chlorhexidine 2% Cloths 1 Application(s) Topical daily  fentaNYL   Infusion. 2 MICROgram(s)/kG/Hr IV Continuous <Continuous>  glycopyrrolate Injectable 0.2 milliGRAM(s) IV Push once  glycopyrrolate Injectable 0.4 milliGRAM(s) IV Push every 6 hours  LORazepam   Injectable 1 milliGRAM(s) IV Push every 2 hours PRN  morphine  - Injectable 2 milliGRAM(s) IV Push every 1 hour PRN    acetaminophen  Suppository .. 650 milliGRAM(s) Rectal every 6 hours PRN  bisacodyl Suppository 10 milliGRAM(s) Rectal daily PRN  cefTRIAXone   IVPB 1000 milliGRAM(s) IV Intermittent every 24 hours  chlorhexidine 2% Cloths 1 Application(s) Topical daily  fentaNYL   Infusion. 2 MICROgram(s)/kG/Hr IV Continuous <Continuous>  glycopyrrolate Injectable 0.2 milliGRAM(s) IV Push once  glycopyrrolate Injectable 0.4 milliGRAM(s) IV Push every 6 hours  LORazepam   Injectable 1 milliGRAM(s) IV Push every 2 hours PRN  morphine  - Injectable 2 milliGRAM(s) IV Push every 1 hour PRN    Drug Dosing Weight  Height (cm): 177.8 (11 Mar 2022 10:00)  Weight (kg): 71.8 (11 Mar 2022 16:32)  BMI (kg/m2): 22.7 (11 Mar 2022 16:32)  BSA (m2): 1.89 (11 Mar 2022 16:32)    CENTRAL LINE: [ ] YES [ ] NO  LOCATION:         LOVELACE: [ ] YES [ ] NO          A-LINE:  [ ] YES [ ] NO  LOCATION:             ICU Vital Signs Last 24 Hrs  T(C): 35.9 (15 Mar 2022 08:00), Max: 36.4 (14 Mar 2022 17:21)  T(F): 96.7 (15 Mar 2022 08:00), Max: 97.6 (14 Mar 2022 17:21)  HR: 114 (15 Mar 2022 08:00) (89 - 152)  BP: 81/53 (15 Mar 2022 08:00) (81/53 - 116/94)  BP(mean): 62 (15 Mar 2022 08:00) (60 - 102)  ABP: --  ABP(mean): --  RR: 19 (15 Mar 2022 08:00) (11 - 28)  SpO2: 92% (15 Mar 2022 08:00) (79% - 96%)      ABG - ( 14 Mar 2022 03:17 )  pH, Arterial: 7.47  pH, Blood: x     /  pCO2: 46    /  pO2: 97    / HCO3: 34    / Base Excess: 8.6   /  SaO2: 98                    03-14 @ 07:01  -  03-15 @ 07:00  --------------------------------------------------------  IN: 96.5 mL / OUT: 485 mL / NET: -388.5 mL        Mode: CPAP with PS  FiO2: 40  PEEP: 5  PS: 8  ITime: 1  MAP: 7  PIP: 15      REVIEW OF SYSTEMS:    as stated in hpi      PHYSICAL EXAM:    GENERAL: NAD, well-groomed, well-developed  EYES: EOMI, PERRLA,   NECK: Supple, No JVD; Normal thyroid; Trachea midline  NERVOUS SYSTEM:  intubated, off sedation  CHEST/LUNG: coarse breath sounds  HEART: Regular rate and rhythm; No murmurs,   ABDOMEN: Soft, Nontender, Nondistended; Bowel sounds present  EXTREMITIES:  2+ Peripheral Pulses, No clubbing, cyanosis, or edema        LABS:  CBC Full  -  ( 14 Mar 2022 04:31 )  WBC Count : 11.67 K/uL  RBC Count : 4.84 M/uL  Hemoglobin : 14.8 g/dL  Hematocrit : 44.0 %  Platelet Count - Automated : 182 K/uL  Mean Cell Volume : 90.9 fl  Mean Cell Hemoglobin : 30.6 pg  Mean Cell Hemoglobin Concentration : 33.6 gm/dL  Auto Neutrophil # : 10.26 K/uL  Auto Lymphocyte # : 0.68 K/uL  Auto Monocyte # : 0.61 K/uL  Auto Eosinophil # : 0.03 K/uL  Auto Basophil # : 0.02 K/uL  Auto Neutrophil % : 87.9 %  Auto Lymphocyte % : 5.8 %  Auto Monocyte % : 5.2 %  Auto Eosinophil % : 0.3 %  Auto Basophil % : 0.2 %    03-14    139  |  100  |  70<H>  ----------------------------<  92  4.0   |  33<H>  |  2.59<H>    Ca    8.7      14 Mar 2022 04:30  Phos  4.0     03-14  Mg     2.3     03-14    TPro  5.5<L>  /  Alb  2.2<L>  /  TBili  2.2<H>  /  DBili  x   /  AST  111<H>  /  ALT  232<H>  /  AlkPhos  88  03-14    PTT - ( 14 Mar 2022 04:31 )  PTT:>200.0 sec        RADIOLOGY & ADDITIONAL STUDIES REVIEWED: Yes    [ ]GOALS OF CARE DISCUSSION WITH PATIENT/FAMILY/PROXY:    CRITICAL CARE TIME SPENT: 35 minutes INTERVAL HPI/OVERNIGHT EVENTS: Pt is lying comfortably in bed. Pt is not able to participate in ROS d/t MS.     PRESSORS: [ ] YES [ ] NO  WHICH:      Antimicrobial:  cefTRIAXone   IVPB 1000 milliGRAM(s) IV Intermittent every 24 hours    Cardiovascular:    Pulmonary:    Hematalogic:    Other:  acetaminophen  Suppository .. 650 milliGRAM(s) Rectal every 6 hours PRN  bisacodyl Suppository 10 milliGRAM(s) Rectal daily PRN  chlorhexidine 2% Cloths 1 Application(s) Topical daily  fentaNYL   Infusion. 2 MICROgram(s)/kG/Hr IV Continuous <Continuous>  glycopyrrolate Injectable 0.2 milliGRAM(s) IV Push once  glycopyrrolate Injectable 0.4 milliGRAM(s) IV Push every 6 hours  LORazepam   Injectable 1 milliGRAM(s) IV Push every 2 hours PRN  morphine  - Injectable 2 milliGRAM(s) IV Push every 1 hour PRN    acetaminophen  Suppository .. 650 milliGRAM(s) Rectal every 6 hours PRN  bisacodyl Suppository 10 milliGRAM(s) Rectal daily PRN  cefTRIAXone   IVPB 1000 milliGRAM(s) IV Intermittent every 24 hours  chlorhexidine 2% Cloths 1 Application(s) Topical daily  fentaNYL   Infusion. 2 MICROgram(s)/kG/Hr IV Continuous <Continuous>  glycopyrrolate Injectable 0.2 milliGRAM(s) IV Push once  glycopyrrolate Injectable 0.4 milliGRAM(s) IV Push every 6 hours  LORazepam   Injectable 1 milliGRAM(s) IV Push every 2 hours PRN  morphine  - Injectable 2 milliGRAM(s) IV Push every 1 hour PRN    Drug Dosing Weight  Height (cm): 177.8 (11 Mar 2022 10:00)  Weight (kg): 71.8 (11 Mar 2022 16:32)  BMI (kg/m2): 22.7 (11 Mar 2022 16:32)  BSA (m2): 1.89 (11 Mar 2022 16:32)    CENTRAL LINE: [ ] YES [ ] NO  LOCATION:         LOVELACE: [ ] YES [ ] NO          A-LINE:  [ ] YES [ ] NO  LOCATION:             ICU Vital Signs Last 24 Hrs  T(C): 35.9 (15 Mar 2022 08:00), Max: 36.4 (14 Mar 2022 17:21)  T(F): 96.7 (15 Mar 2022 08:00), Max: 97.6 (14 Mar 2022 17:21)  HR: 114 (15 Mar 2022 08:00) (89 - 152)  BP: 81/53 (15 Mar 2022 08:00) (81/53 - 116/94)  BP(mean): 62 (15 Mar 2022 08:00) (60 - 102)  ABP: --  ABP(mean): --  RR: 19 (15 Mar 2022 08:00) (11 - 28)  SpO2: 92% (15 Mar 2022 08:00) (79% - 96%)      ABG - ( 14 Mar 2022 03:17 )  pH, Arterial: 7.47  pH, Blood: x     /  pCO2: 46    /  pO2: 97    / HCO3: 34    / Base Excess: 8.6   /  SaO2: 98                    03-14 @ 07:01  -  03-15 @ 07:00  --------------------------------------------------------  IN: 96.5 mL / OUT: 485 mL / NET: -388.5 mL        Mode: CPAP with PS  FiO2: 40  PEEP: 5  PS: 8  ITime: 1  MAP: 7  PIP: 15      REVIEW OF SYSTEMS:    as stated in hpi      PHYSICAL EXAM:    GENERAL: NAD, well-groomed, well-developed  EYES: PERRL  NECK: Supple, No JVD; Normal thyroid; Trachea midline  NERVOUS SYSTEM:  intubated, off sedation  CHEST/LUNG: coarse breath sounds  HEART: Regular rate and rhythm; No murmurs,   ABDOMEN: Soft, Nontender, Nondistended; Bowel sounds present  EXTREMITIES:  2+ Peripheral Pulses, No clubbing, cyanosis, or edema        LABS:  CBC Full  -  ( 14 Mar 2022 04:31 )  WBC Count : 11.67 K/uL  RBC Count : 4.84 M/uL  Hemoglobin : 14.8 g/dL  Hematocrit : 44.0 %  Platelet Count - Automated : 182 K/uL  Mean Cell Volume : 90.9 fl  Mean Cell Hemoglobin : 30.6 pg  Mean Cell Hemoglobin Concentration : 33.6 gm/dL  Auto Neutrophil # : 10.26 K/uL  Auto Lymphocyte # : 0.68 K/uL  Auto Monocyte # : 0.61 K/uL  Auto Eosinophil # : 0.03 K/uL  Auto Basophil # : 0.02 K/uL  Auto Neutrophil % : 87.9 %  Auto Lymphocyte % : 5.8 %  Auto Monocyte % : 5.2 %  Auto Eosinophil % : 0.3 %  Auto Basophil % : 0.2 %    03-14    139  |  100  |  70<H>  ----------------------------<  92  4.0   |  33<H>  |  2.59<H>    Ca    8.7      14 Mar 2022 04:30  Phos  4.0     03-14  Mg     2.3     03-14    TPro  5.5<L>  /  Alb  2.2<L>  /  TBili  2.2<H>  /  DBili  x   /  AST  111<H>  /  ALT  232<H>  /  AlkPhos  88  03-14    PTT - ( 14 Mar 2022 04:31 )  PTT:>200.0 sec        RADIOLOGY & ADDITIONAL STUDIES REVIEWED: Yes    [ ]GOALS OF CARE DISCUSSION WITH PATIENT/FAMILY/PROXY:    CRITICAL CARE TIME SPENT: 35 minutes

## 2022-03-15 NOTE — PROGRESS NOTE ADULT - PROBLEM SELECTOR PROBLEM 3
Encounter for palliative care
LUANNE (acute kidney injury)

## 2022-03-15 NOTE — PROGRESS NOTE ADULT - PROVIDER SPECIALTY LIST ADULT
Cardiology
Internal Medicine
Internal Medicine
Cardiology
Critical Care
Cardiology
Palliative Care
Cardiology
Single Mechanical/Accidental Fall

## 2022-03-15 NOTE — PROGRESS NOTE ADULT - PROBLEM SELECTOR PLAN 1
was on mechanical ventilator support  planned for medical extubation but no reintubation.   Robinul prn, ativan prn, morphine prn  management as per ICU team. Pt with decline over the past few weeks prior to hospital presentation, decreasing in ambulatory status and requiring increasing assistance with ADLs.  extubated 3/14, medical vs palliative extubation, now for comfort measures only  Robinul prn, ativan prn, morphine prn for secretions, respiratory distress  will refer patient for in patient hospice

## 2022-03-15 NOTE — DISCHARGE NOTE PROVIDER - NSDCCAREPROVSEEN_GEN_ALL_CORE_FT
Dylan, Willian Barclay, Tamera Reyes, Korin Travis, Zenon Green, Michelle Red, Alen Vanegas, Real Nelson, Yumiko Maddox, Octaviano Javier, Tomás Burk

## 2022-03-15 NOTE — PROGRESS NOTE ADULT - PROBLEM SELECTOR PROBLEM 1
Acute exacerbation of CHF (congestive heart failure)
Acute exacerbation of CHF (congestive heart failure)
Acute respiratory failure, unspecified whether with hypoxia or hypercapnia
Acute exacerbation of CHF (congestive heart failure)
Acute exacerbation of CHF (congestive heart failure)

## 2022-03-15 NOTE — DISCHARGE NOTE PROVIDER - NSDCCPCAREPLAN_GEN_ALL_CORE_FT
PRINCIPAL DISCHARGE DIAGNOSIS  Diagnosis: Acute respiratory failure with hypoxia  Assessment and Plan of Treatment: You presented to the hospital with shortness of breath likely due to congestive heart failure and possibly pneumonia. You were treated with water pills to help decrease your fluid retention. You were also started on antibiotics. Your symptoms did not improve. You required intubation due to your decreasing oxygen saturation levels. You were placed on a breathing trial to see if you would be able to tolerate extubation. Your daughter had conversations with palliative and she stated that it was your wish to not have these aggresive methods of treatments at this stage in your life. You were extubated. Your daughter elected to have your care placed under hospice/comfort care.      SECONDARY DISCHARGE DIAGNOSES  Diagnosis: Acute exacerbation of CHF (congestive heart failure)  Assessment and Plan of Treatment: see above

## 2022-03-15 NOTE — PROGRESS NOTE ADULT - PROBLEM SELECTOR PLAN 3
Extubated 3/14.   Pt for comfort measures only, updated MOLST in chart  No further lab draws  Referred patient for inpatient hospice  Social work referral placed.  will continue to follow Extubated 3/14. Pt for comfort measures only, updated MOLST in chart  No further lab draws  Referred patient for inpatient hospice  Social work referral placed.  will continue to follow

## 2022-03-15 NOTE — PROGRESS NOTE ADULT - SUBJECTIVE AND OBJECTIVE BOX
follow up on:  complex medical decision making related to goals of care    [facility] Geriatric and Palliative Consult Service:  Dr. Lisa Irby: cell ([phone])  Dr. Michelle Green: cell ([phone])   Zion Delgado NP: cell ([phone])   Delma Hope NP: cell ([phone])  Satinder Nunez LMSW: cell ([phone])     OVERNIGHT EVENTS:    Present Symptoms:   Pain:   Fatigue:  Nausea:  Lack of Appetite:   SOB:  Depression:  Anxiety:  Review of Systems: [All others negative or Unable to obtain due to poor mentation]    MEDICATIONS  (STANDING):  cefTRIAXone   IVPB 1000 milliGRAM(s) IV Intermittent every 24 hours  chlorhexidine 2% Cloths 1 Application(s) Topical daily  fentaNYL   Infusion. 2 MICROgram(s)/kG/Hr (14.4 mL/Hr) IV Continuous <Continuous>  glycopyrrolate Injectable 0.2 milliGRAM(s) IV Push once  glycopyrrolate Injectable 0.4 milliGRAM(s) IV Push every 6 hours    MEDICATIONS  (PRN):  acetaminophen  Suppository .. 650 milliGRAM(s) Rectal every 6 hours PRN Temp greater or equal to 38C (100.4F)  bisacodyl Suppository 10 milliGRAM(s) Rectal daily PRN Constipation  LORazepam   Injectable 1 milliGRAM(s) IV Push every 2 hours PRN Agitation  morphine  - Injectable 2 milliGRAM(s) IV Push every 1 hour PRN dyspnea      PHYSICAL EXAM:  Vital Signs Last 24 Hrs  T(C): 35.9 (15 Mar 2022 08:00), Max: 36.4 (14 Mar 2022 17:21)  T(F): 96.7 (15 Mar 2022 08:00), Max: 97.6 (14 Mar 2022 17:21)  HR: 114 (15 Mar 2022 08:00) (89 - 152)  BP: 81/53 (15 Mar 2022 08:00) (81/53 - 116/94)  BP(mean): 62 (15 Mar 2022 08:00) (60 - 102)  RR: 19 (15 Mar 2022 08:00) (11 - 28)  SpO2: 92% (15 Mar 2022 08:00) (79% - 96%)    General:  lethargic nonverbal     Palliative Performance Scale/Karnofsky Score:  ECOG Performance:    HEENT: no abnormal lesion, dry mouth    Lungs: (+) ronchorous b/l  CV: RRR, S1S2, tachycardia  GI: soft non distended non tender  incontinent  : dupont  Musculoskeletal: weakness x4 edema x4    Skin: no abnormal skin lesions, poor skin turgor,   Neuro: unable to evaluate due to mental status  Oral intake ability: unable/only mouth care    LABS:                          14.8   11.67 )-----------( 182      ( 14 Mar 2022 04:31 )             44.0     03-14    139  |  100  |  70<H>  ----------------------------<  92  4.0   |  33<H>  |  2.59<H>    Ca    8.7      14 Mar 2022 04:30  Phos  4.0     03-14  Mg     2.3     03-14    TPro  5.5<L>  /  Alb  2.2<L>  /  TBili  2.2<H>  /  DBili  x   /  AST  111<H>  /  ALT  232<H>  /  AlkPhos  88  03-14        RADIOLOGY & ADDITIONAL STUDIES: follow up on:  complex medical decision making related to goals of care    [facility] Geriatric and Palliative Consult Service:  Dr. Lisa Irby: cell ([phone])  Dr. Michelle Green: cell ([phone])   Zion Delgado NP: cell ([phone])   Delma Hope NP: cell ([phone])  Satinder Nunez LMSW: cell ([phone])     OVERNIGHT EVENTS:  No acute events overnight. Pt extubated 3/14. Met with patient family Zarina at bedside and is interested in IPU.     Present Symptoms:   Pain: -  Fatigue:  Nausea:  Lack of Appetite:   SOB: -  Depression:  Anxiety:  Review of Systems: [All others negative or Unable to obtain due to poor mentation]    MEDICATIONS  (STANDING):  cefTRIAXone   IVPB 1000 milliGRAM(s) IV Intermittent every 24 hours  chlorhexidine 2% Cloths 1 Application(s) Topical daily  fentaNYL   Infusion. 2 MICROgram(s)/kG/Hr (14.4 mL/Hr) IV Continuous <Continuous>  glycopyrrolate Injectable 0.2 milliGRAM(s) IV Push once  glycopyrrolate Injectable 0.4 milliGRAM(s) IV Push every 6 hours    MEDICATIONS  (PRN):  acetaminophen  Suppository .. 650 milliGRAM(s) Rectal every 6 hours PRN Temp greater or equal to 38C (100.4F)  bisacodyl Suppository 10 milliGRAM(s) Rectal daily PRN Constipation  LORazepam   Injectable 1 milliGRAM(s) IV Push every 2 hours PRN Agitation  morphine  - Injectable 2 milliGRAM(s) IV Push every 1 hour PRN dyspnea      PHYSICAL EXAM:  Vital Signs Last 24 Hrs  T(C): 35.9 (15 Mar 2022 08:00), Max: 36.4 (14 Mar 2022 17:21)  T(F): 96.7 (15 Mar 2022 08:00), Max: 97.6 (14 Mar 2022 17:21)  HR: 114 (15 Mar 2022 08:00) (89 - 152)  BP: 81/53 (15 Mar 2022 08:00) (81/53 - 116/94)  BP(mean): 62 (15 Mar 2022 08:00) (60 - 102)  RR: 19 (15 Mar 2022 08:00) (11 - 28)  SpO2: 92% (15 Mar 2022 08:00) (79% - 96%)    General:  lethargic nonverbal     Palliative Performance Scale/Karnofsky Score:  ECOG Performance:    HEENT: no abnormal lesion, dry mouth    Lungs: (+) ronchorous b/l  CV: RRR, S1S2, tachycardia  GI: soft non distended non tender  incontinent  : dupont  Musculoskeletal: weakness x4 edema x4    Skin: no abnormal skin lesions, poor skin turgor,   Neuro: unable to evaluate due to mental status  Oral intake ability: unable/only mouth care    LABS:                          14.8   11.67 )-----------( 182      ( 14 Mar 2022 04:31 )             44.0     03-14    139  |  100  |  70<H>  ----------------------------<  92  4.0   |  33<H>  |  2.59<H>    Ca    8.7      14 Mar 2022 04:30  Phos  4.0     03-14  Mg     2.3     03-14    TPro  5.5<L>  /  Alb  2.2<L>  /  TBili  2.2<H>  /  DBili  x   /  AST  111<H>  /  ALT  232<H>  /  AlkPhos  88  03-14        RADIOLOGY & ADDITIONAL STUDIES:

## 2022-03-15 NOTE — PROGRESS NOTE ADULT - PROBLEM SELECTOR PROBLEM 2
Atrial fibrillation
Atrial fibrillation
Acute exacerbation of CHF (congestive heart failure)
Atrial fibrillation
Atrial fibrillation

## 2022-03-15 NOTE — H&P ADULT - DOES THIS PATIENT HAVE A HISTORY OF OR HAS BEEN DX WITH HEART FAILURE?
Reviewed    Problem: MOBILITY - ADULT  Goal: Maintain or return to baseline ADL function  Description: INTERVENTIONS:  -  Assess patient's ability to carry out ADLs; assess patient's baseline for ADL function and identify physical deficits which impact ability to perform ADLs (bathing, care of mouth/teeth, toileting, grooming, dressing, etc )  - Assess/evaluate cause of self-care deficits   - Assess range of motion  - Assess patient's mobility; develop plan if impaired  - Assess patient's need for assistive devices and provide as appropriate  - Encourage maximum independence but intervene and supervise when necessary  - Involve family in performance of ADLs  - Assess for home care needs following discharge   - Consider OT consult to assist with ADL evaluation and planning for discharge  - Provide patient education as appropriate  Outcome: Progressing         Problem: MUSCULOSKELETAL - ADULT  Goal: Maintain or return mobility to safest level of function  Description: INTERVENTIONS:  - Assess patient's ability to carry out ADLs; assess patient's baseline for ADL function and identify physical deficits which impact ability to perform ADLs (bathing, care of mouth/teeth, toileting, grooming, dressing, etc )  - Assess/evaluate cause of self-care deficits   - Assess range of motion  - Assess patient's mobility  - Assess patient's need for assistive devices and provide as appropriate  - Encourage maximum independence but intervene and supervise when necessary  - Involve family in performance of ADLs  - Assess for home care needs following discharge   - Consider OT consult to assist with ADL evaluation and planning for discharge  - Provide patient education as appropriate  Outcome: Progressing     Problem: Prexisting or High Potential for Compromised Skin Integrity  Goal: Skin integrity is maintained or improved  Description: INTERVENTIONS:  - Identify patients at risk for skin breakdown  - Assess and monitor skin integrity  - Assess and monitor nutrition and hydration status  - Monitor labs   - Assess for incontinence   - Turn and reposition patient  - Assist with mobility/ambulation  - Relieve pressure over bony prominences  - Avoid friction and shearing  - Provide appropriate hygiene as needed including keeping skin clean and dry  - Evaluate need for skin moisturizer/barrier cream  - Collaborate with interdisciplinary team   - Patient/family teaching  - Consider wound care consult   Outcome: Progressing yes

## 2022-03-15 NOTE — PROGRESS NOTE ADULT - PROBLEM SELECTOR PLAN 2
admitted for chf exacerbation with trial of intubation without improvement. Pt extubated 3/14.  was on lasix gtt and bumex. now discontinued
pt is on Eliquis and metoprolol at home  c/w home meds

## 2022-03-15 NOTE — CHART NOTE - NSCHARTNOTEFT_GEN_A_CORE
<Hospital course>    89 years old male with past medical history of past medical history of HTN, BPH,latent TB and COPD, CHF who was brought to ED due to worsening SOB of two weeks .      Patient reports that he wasn't aware that he has heart problem.     ED COURSE:  Patient is saturating 96% on 3L NC in ED. And had crackles on exam  Labs showed flat trops at 500 and Pro BNP at 11K  Patient was admitted for CHF exacerbation requiring BIPAP     On 3/10 patient had sudden onset desaturation and worsening SOB. Patient had chest xray that showed worensing congestion. Patient was started on Lasix drip. ECHO showed severe LV dysfunction. Pt was tachpenic overnight and was placed on NRB, ICU was consulted and placed on Bipap. Transferred to ICU.    ICU COURSE   In the ICU patient was also noted to have severely productive cough with rhonchi on asucultation, with which patient became icreasingly more hypoxic. Pt required intubation and sedation. Pt's AHRF was likely secondary to CHF exacerbation and PNA. Pt was treated with lasix drip, which was switched to 60IV BID. Pt's urine output was not responding well to the lasix. Pt was switched to Bumex for diuresis. Pt was covered with Azithro and ceftriaxone for PNA. Legionella, Mycoplasma and RVP were neg. Azithromycin was discontinued. Pt's family had conversations with palliative care. Patient's family felt it aligned with the patient's wishes to do an extubation with no intubation, shall the patient fail the extubation. Pt was started on SBT, and patient was extubated. Pt's work of breathing was elevated after extubation as seen with accessory muscle use. Pt was given morphine and glycopyrolate. Pt's family opted to place patient on comfort care only after further discussion with palliative.     Patient is stable for downgrade to floor under care of Dr. Javier for further management , covering NP Catherine was informed    To follow  N/A  comfort care

## 2022-03-15 NOTE — DISCHARGE NOTE PROVIDER - HOSPITAL COURSE
89 years old male with past medical history of past medical history of HTN, BPH,latent TB and COPD, CHF who was brought to ED due to worsening SOB of two weeks .      Patient reports that he wasn't aware that he has heart problem.     ED COURSE:  Patient is saturating 96% on 3L NC in ED. And had crackles on exam  Labs showed flat trops at 500 and Pro BNP at 11K  Patient was admitted for CHF exacerbation requiring BIPAP     On 3/10 patient had sudden onset desaturation and worsening SOB. Patient had chest xray that showed worsening congestion. Patient was started on Lasix drip. ECHO showed severe LV dysfunction. Pt was tachypneic overnight and was placed on NRB, ICU was consulted and placed on Bipap. Transferred to ICU.    ICU COURSE   In the ICU patient was also noted to have severely productive cough with rhonchi on auscultation, with which patient became increasingly more hypoxic. Pt required intubation and sedation. Pt's AHRF was likely secondary to CHF exacerbation and PNA. Pt was treated with lasix drip, which was switched to 60IV BID. Pt's urine output was not responding well to the lasix. Pt was switched to Bumex for diuresis. Pt was covered with Azithro and ceftriaxone for PNA. Legionella, Mycoplasma and RVP were neg. Azithromycin was discontinued. Pt's family had conversations with palliative care. Patient's family felt it aligned with the patient's wishes to do an extubation with no intubation, shall the patient fail the extubation. Pt was started on SBT, and patient was extubated. Pt's work of breathing was elevated after extubation as seen with accessory muscle use. Pt was given morphine and glycopyrolate. Pt's family opted to place patient on comfort care only after further discussion with palliative.

## 2022-03-15 NOTE — PROGRESS NOTE ADULT - REASON FOR ADMISSION
CHF exacerbation

## 2022-03-15 NOTE — H&P ADULT - PROBLEM SELECTOR PLAN 4
Eligible for continued inpatient hospice care admission due to ongoing active symptom management with IV medications  Bowel regimen with Dulcolax suppository PRN  for constipation  Robinul IVP PRN for Oral secretions management   Tylenol suppository PRN for fever  oral hygiene  Comfort care  Palliative/Hospice education and counseling to family/caregivers

## 2022-03-15 NOTE — H&P ADULT - NSHPLABSRESULTS_GEN_ALL_CORE
< from: Xray Chest 1 View- PORTABLE-Urgent (Xray Chest 1 View- PORTABLE-Urgent .) (03.12.22 @ 20:13) >    ACC: [id number] EXAM:  XR CHEST PORTABLE URGENT 1V                          PROCEDURE DATE:  03/12/2022          INTERPRETATION:  CHEST X-RAY: AP PORTABLE    INDICATION: NG tube confirmation    COMPARISON: 3/11/2022    FINDINGS:    Endotracheal tube above the don and nasogastric tube tip to at least   the level of the mid stomach. Stable cardiomediastinal silhouette,   layering pleural effusions and atelectasis or consolidation in the lower   lungs. No pneumothorax.    IMPRESSION:    Appropriate course of support lines. Stable pulmonary findings.    --- End of Report ---< from: CT Head No Cont (03.08.22 @ 17:57) >    ACC: [id number] EXAM:  CT BRAIN                          PROCEDURE DATE:  03/08/2022          INTERPRETATION:  .    CLINICAL INFORMATION: Confusion.    TECHNIQUE: Multiple axial CT images of the head were obtained without   contrast. Sagittal and coronal reconstructed images were acquired from   the source data.    COMPARISON: Prior head CT study from 1/25/2017.    FINDINGS: There is no acute intracranial hemorrhage, mass effect, shift   of the midline structures, herniation, extra-axial fluid collection, or   hydrocephalus. A prominent cisterna magna appears unchanged.    There is diffuse cerebral volume loss with prominence of the sulci,   fissures, and cisternal spaces which is normal for the patient's age.   There is mild deep and periventricular white matter hypoattenuation   statistically compatible with microvascular changes given calcific   atherosclerotic disease of the intracranial arteries.    The paranasal sinuses and mastoid air cells are clear. The calvarium is   intact. Oldbilateral nasal bone fractures are seen. The imaged orbits   are unremarkable apart from a left-sided cataract removal.    IMPRESSION: No acute intracranial hemorrhage, mass effect, or shift of   the midline structures.                  14.8   11.67 )-----------( 182      ( 14 Mar 2022 04:31 )             44.0     03-14    139  |  100  |  70<H>  ----------------------------<  92  4.0   |  33<H>  |  2.59<H>    Ca    8.7      14 Mar 2022 04:30  Phos  4.0     03-14  Mg     2.3     03-14    TPro  5.5<L>  /  Alb  2.2<L>  /  TBili  2.2<H>  /  DBili  x   /  AST  111<H>  /  ALT  232<H>  /  AlkPhos  88  03-14

## 2022-03-16 VITALS
DIASTOLIC BLOOD PRESSURE: 75 MMHG | SYSTOLIC BLOOD PRESSURE: 102 MMHG | RESPIRATION RATE: 16 BRPM | TEMPERATURE: 97 F | OXYGEN SATURATION: 100 % | HEART RATE: 100 BPM

## 2022-03-16 PROCEDURE — 99238 HOSP IP/OBS DSCHRG MGMT 30/<: CPT

## 2022-03-16 RX ADMIN — HYDROMORPHONE HYDROCHLORIDE 0.5 MILLIGRAM(S): 2 INJECTION INTRAMUSCULAR; INTRAVENOUS; SUBCUTANEOUS at 12:30

## 2022-03-16 RX ADMIN — ROBINUL 0.4 MILLIGRAM(S): 0.2 INJECTION INTRAMUSCULAR; INTRAVENOUS at 20:59

## 2022-03-16 RX ADMIN — HYDROMORPHONE HYDROCHLORIDE 0.5 MILLIGRAM(S): 2 INJECTION INTRAMUSCULAR; INTRAVENOUS; SUBCUTANEOUS at 12:12

## 2022-03-16 RX ADMIN — HYDROMORPHONE HYDROCHLORIDE 0.5 MILLIGRAM(S): 2 INJECTION INTRAMUSCULAR; INTRAVENOUS; SUBCUTANEOUS at 20:59

## 2022-03-16 RX ADMIN — HYDROMORPHONE HYDROCHLORIDE 0.5 MILLIGRAM(S): 2 INJECTION INTRAMUSCULAR; INTRAVENOUS; SUBCUTANEOUS at 21:13

## 2022-03-16 NOTE — ED ADULT TRIAGE NOTE - TEMPERATURE IN FAHRENHEIT (DEGREES F)
Discussed with team  Has velocities on doppler consistent with moderate disease but ratio more consistent with severe  Will get CTA neck 97.4

## 2022-03-16 NOTE — PROGRESS NOTE ADULT - SUBJECTIVE AND OBJECTIVE BOX
PAWAN STALEY                    89y  Male        Symptoms:  Pain (1-10):  Dyspnea:  Nausea/Vomiting:  Secretions:   Agitation:  Symptom Requiring Inpatient Hospice Admission:    Overnight events/interim history:    HPI:   89 years old male with past medical history of past medical history of HTN, BPH,latent TB and COPD, CHF who was brought to ED due to worsening SOB of two weeks .      Patient reports that he wasn't aware that he has heart problem.     ED COURSE:  Patient is saturating 96% on 3L NC in ED. And had crackles on exam  Labs showed flat trops at 500 and Pro BNP at 11K  Patient was admitted for CHF exacerbation requiring BIPAP     On 3/10 patient had sudden onset desaturation and worsening SOB. Patient had chest xray that showed worsening congestion. Patient was started on Lasix drip. ECHO showed severe LV dysfunction. Pt was tachypneic overnight and was placed on NRB, ICU was consulted and placed on Bipap. Transferred to ICU.    ICU COURSE   In the ICU patient was also noted to have severely productive cough with rhonchi on auscultation, with which patient became increasingly more hypoxic. Pt required intubation and sedation. Pt's AHRF was likely secondary to CHF exacerbation and PNA. Pt was treated with lasix drip, which was switched to 60IV BID. Pt's urine output was not responding well to the lasix. Pt was switched to Bumex for diuresis. Pt was covered with Azithro and ceftriaxone for PNA. Legionella, Mycoplasma and RVP were neg. Azithromycin was discontinued. Pt's family had conversations with palliative care. Patient's family felt it aligned with the patient's wishes to do a palliative extubation with no reintubation, should the patient fail the extubation. Pt was started on SBT, and patient was extubated. Pt's work of breathing was elevated after extubation as seen with accessory muscle use. Pt was given morphine and glycopyrolate. Pt's family opted to place patient on comfort care only after further discussion with palliative. Eligible for inpatient hospice (15 Mar 2022 19:35)          PPSV2:     Code Status: DNR/DNI      Allergies    No Known Allergies    Intolerances        MEDICATIONS  (STANDING):    MEDICATIONS  (PRN):  acetaminophen  Suppository .. 650 milliGRAM(s) Rectal every 6 hours PRN Temp greater or equal to 38C (100.4F)  bisacodyl Suppository 10 milliGRAM(s) Rectal daily PRN Constipation  glycopyrrolate Injectable 0.4 milliGRAM(s) IV Push four times a day PRN Secretions  HYDROmorphone  Injectable 0.5 milliGRAM(s) IV Push every 1 hour PRN respiratory distress, labored breathing  LORazepam   Injectable 1 milliGRAM(s) IV Push every 1 hour PRN Agitation                             Vital Signs Last 24 Hrs  T(C): 36.3 (16 Mar 2022 13:26), Max: 36.8 (16 Mar 2022 04:58)  T(F): 97.4 (16 Mar 2022 13:26), Max: 98.3 (16 Mar 2022 04:58)  HR: 100 (16 Mar 2022 13:26) (87 - 137)  BP: 102/75 (16 Mar 2022 13:26) (92/60 - 108/74)  BP(mean): --  RR: 16 (16 Mar 2022 13:26) (12 - 33)  SpO2: 100% (16 Mar 2022 13:26) (91% - 100%)            PHYSICAL EXAM-  GENERAL: alert, NAD  HEENT: Atraumatic, oropharynx clear, neck supple  CHEST/LUNG: unlabored  HEART: Regular rate and rhythm    ABDOMEN: Soft, Nontender, Nondistended, PEG, last BM  MUSCULOSKELETAL:  No  edema, ambulatory/bedbound  NERVOUS SYSTEM:  Alert & Oriented X3,  follows commands  SKIN: No rashes or lesions noted  Oral intake:     PAWAN STALEY                    89y  Male         Symptom Requiring Inpatient Hospice Admission: dyspnea    Overnight events/interim history: unresponsive, mildly labored breathing. Daughter at bedside.     HPI:   89 years old male with past medical history of past medical history of HTN, BPH,latent TB and COPD, CHF who was brought to ED due to worsening SOB of two weeks .      Patient reports that he wasn't aware that he has heart problem.     ED COURSE:  Patient is saturating 96% on 3L NC in ED. And had crackles on exam  Labs showed flat trops at 500 and Pro BNP at 11K  Patient was admitted for CHF exacerbation requiring BIPAP     On 3/10 patient had sudden onset desaturation and worsening SOB. Patient had chest xray that showed worsening congestion. Patient was started on Lasix drip. ECHO showed severe LV dysfunction. Pt was tachypneic overnight and was placed on NRB, ICU was consulted and placed on Bipap. Transferred to ICU.    ICU COURSE   In the ICU patient was also noted to have severely productive cough with rhonchi on auscultation, with which patient became increasingly more hypoxic. Pt required intubation and sedation. Pt's AHRF was likely secondary to CHF exacerbation and PNA. Pt was treated with lasix drip, which was switched to 60IV BID. Pt's urine output was not responding well to the lasix. Pt was switched to Bumex for diuresis. Pt was covered with Azithro and ceftriaxone for PNA. Legionella, Mycoplasma and RVP were neg. Azithromycin was discontinued. Pt's family had conversations with palliative care. Patient's family felt it aligned with the patient's wishes to do a palliative extubation with no reintubation, should the patient fail the extubation. Pt was started on SBT, and patient was extubated. Pt's work of breathing was elevated after extubation as seen with accessory muscle use. Pt was given morphine and glycopyrolate. Pt's family opted to place patient on comfort care only after further discussion with palliative. Eligible for inpatient hospice (15 Mar 2022 19:35)          PPSV2: 10    Code Status: DNR/DNI      Allergies    No Known Allergies    Intolerances        MEDICATIONS  (STANDING):    MEDICATIONS  (PRN):  acetaminophen  Suppository .. 650 milliGRAM(s) Rectal every 6 hours PRN Temp greater or equal to 38C (100.4F)  bisacodyl Suppository 10 milliGRAM(s) Rectal daily PRN Constipation  glycopyrrolate Injectable 0.4 milliGRAM(s) IV Push four times a day PRN Secretions  HYDROmorphone  Injectable 0.5 milliGRAM(s) IV Push every 1 hour PRN respiratory distress, labored breathing  LORazepam   Injectable 1 milliGRAM(s) IV Push every 1 hour PRN Agitation                             Vital Signs Last 24 Hrs  T(C): 36.3 (16 Mar 2022 13:26), Max: 36.8 (16 Mar 2022 04:58)  T(F): 97.4 (16 Mar 2022 13:26), Max: 98.3 (16 Mar 2022 04:58)  HR: 100 (16 Mar 2022 13:26) (87 - 137)  BP: 102/75 (16 Mar 2022 13:26) (92/60 - 108/74)  BP(mean): --  RR: 16 (16 Mar 2022 13:26) (12 - 33)  SpO2: 100% (16 Mar 2022 13:26) (91% - 100%)            PHYSICAL EXAM-  GENERAL: unresponsive, cachectic, on NC,   NAD  HEENT: Atraumatic, oropharynx clear, neck supple  CHEST/LUNG: mildly labored  HEART: Regular rate and rhythm    ABDOMEN: Soft, Nontender, Nondistended   : dupont in place, min dark colored urine  MUSCULOSKELETAL:  No  edema,  bedbound  NERVOUS SYSTEM:  unresponsive  SKIN: No rashes or lesions noted  Oral intake: npo

## 2022-03-16 NOTE — DISCHARGE NOTE FOR THE EXPIRED PATIENT - HOSPITAL COURSE
89 years old male with past medical history of  HTN, BPH, latent TB and COPD, CHF who was brought to ED due to worsening SOB of two weeks. Patient was admitted for CHF exacerbation requiring BIPAP.  On 3/10 patient had sudden onset desaturation and worsening SOB. Patient had chest xray that showed worsening congestion. Patient was started on Lasix drip. ECHO showed severe LV dysfunction. Pt was tachypneic overnight and was placed on NRB, ICU was consulted and placed on Bipap. Transferred to ICU. In the ICU patient was also noted to have severely productive cough with rhonchi on auscultation, with which patient became increasingly more hypoxic. Pt required intubation and sedation. Pt's AHRF was likely secondary to CHF exacerbation and PNA. Pt was treated with lasix drip, which was switched to 60IV BID. Pt's urine output was not responding well to the lasix. Pt was switched to Bumex for diuresis. Pt was covered with Azithro and ceftriaxone for PNA. Legionella, Mycoplasma and RVP were neg. Azithromycin was discontinued. Pt's family had conversations with palliative care. Patient's family felt it aligned with the patient's wishes to do an extubation with no intubation, shall the patient fail the extubation. Pt was started on SBT, and patient was extubated. Pt's work of breathing was elevated after extubation as seen with accessory muscle use. Pt was given morphine and glycopyrolate. Pt's family opted to place patient on comfort care only after further discussion with palliative.   Called to see patient for unresponsiveness. On exam the patient did not respond to verbal, physical or noxious stimuli. Absent heart and breath sounds. Absent central and  peripheral pulses. Pupils fixed and dilated, no corneal reflex. Pronounced dead at 22:35 pm . Attending Dr. Irby  informed. Next of kin Zarina Alvarez [phone]  notified. Autopsy denied. Organ donation confirmation number [id number].  [facility] called.

## 2022-03-16 NOTE — PROGRESS NOTE ADULT - PROBLEM SELECTOR PLAN 4
Eligible for continued inpatient hospice care admission due to ongoing active symptom management with IV medications  Bowel regimen with Dulcolax suppository PRN  for constipation  Robinul IVP PRN for Oral secretions management   Tylenol suppository PRN for fever  oral hygiene  Comfort care  Palliative/Hospice education and counseling to family/caregivers  Support provided to daughter at bedside  Prognosis could be anytime, but likely hours to days

## 2022-06-08 NOTE — PROGRESS NOTE ADULT - ATTENDING COMMENTS
Called [facility] MRI department to let them know that I faxed the separate order for mri and mrcp. Fax # [phone]  
over all improving   possible in am  adult home with home pt
patient is clinically stable at present   no need for cardilogy at present   d/c back to adult home  with po abx  PATIENT TO SEE HIS PCP IN 2-5 DAYS
patient was seen and examined along with resident   above discussed   reviewed and agreed   cardiology eval

## 2022-07-11 NOTE — DIETITIAN INITIAL EVALUATION ADULT. - CALCULATED TO (G/KG)
Medicare Wellness Visit  Plan for Preventive Care    A good way for you to stay healthy is to use preventive care.  Medicare covers many services that can help you stay healthy.* The goal of these services is to find any health problems as quickly as possible. Finding problems early can help make them easier to treat.  Your personal plan below lists the services you may need and when they are due.      Health Maintenance Summary     COVID-19 Vaccine (4 - Booster for Pfizer series)  Overdue since 2/18/2022    Traditional Medicare- Medicare Wellness Visit (Yearly)  Due since 6/30/2022    Influenza Vaccine (1)  Next due on 9/1/2022    Depression Screening (Yearly)  Next due on 7/4/2023    Colonoscopy Risk (Every 5 Years)  Next due on 2/14/2024    DTaP/Tdap/Td Vaccine (2 - Td or Tdap)  Next due on 3/16/2027    Hepatitis C Screening   Completed    Pneumococcal Vaccine 65+   Completed    Shingles Vaccine   Completed    Osteoporosis Screening   Scheduled for 6/5/2023    Hepatitis B Vaccine   Aged Out    Meningococcal Vaccine   Aged Out    HPV Vaccine   Aged Out           Preventive Care for Women and Men    Heart Screenings (Cardiovascular):  · Blood tests are used to check your cholesterol, lipid and triglyceride levels. High levels can increase your risk for heart disease and stroke. High levels can be treated with medications, diet and exercise. Lowering your levels can help keep your heart and blood vessels healthy.  Your provider will order these tests if they are needed.    · An ultrasound is done to see if you have an abdominal aortic aneurysm (AAA).  This is an enlargement of one of the main blood vessels that delivers blood to the body.   In the [place], 9,000 deaths are caused by AAA.  You may not even know you have this problem and as many as 1 in 3 people will have a serious problem if it is not treated.  Early diagnosis allows for more effective treatment and cure.  If you have a family history of AAA  or are a male age 65-75 who has smoked, you are at higher risk of an AAA.  Your provider can order this test, if needed.    Colorectal Screening:  · There are many tests that are used to check for cancer of your colon and rectum. You and your provider should discuss what test is best for you and when to have it done.  Options include:  · Screening Colonoscopy: exam of the entire colon, seen through a flexible lighted tube.  · Flexible Sigmoidoscopy: exam of the last third (sigmoid portion) of the colon and rectum, seen through a flexible lighted tube.  · Cologuard DNA stool test: a sample of your stool is used to screen for cancer and unseen blood in your stool.  · Fecal Occult Blood Test: a sample of your stool is studied to find any unseen blood    Flu Shot:  · An immunization that helps to prevent influenza (the flu). You should get this every year. The best time to get the shot is in the fall.    Pneumococcal Shot:  • Vaccines are available that can help prevent pneumococcal disease, which is any type of infection caused by Streptococcus pneumoniae bacteria.   Their use can prevent some cases of pneumonia, meningitis, and sepsis. There are two types of pneumococcal vaccines:   o Conjugate vaccines (PCV-13 or Prevnar 13®) - helps protect against the 13 types of pneumococcal bacteria that are the most common causes of serious infections in children and adults.    o Polysaccharide vaccine (PPSV23 or [id number]®) - helps protect against 23 types of pneumococcal bacteria for patients who are recommended to get it.  These vaccines should be given at least 12 months apart.  A booster is usually not needed.     Hepatitis B Shot:  · An immunization that helps to protect people from getting Hepatitis B. Hepatitis B is a virus that spreads through contact with infected blood or body fluids. Many people with the virus do not have symptoms.  The virus can lead to serious problems, such as liver disease. Some people are at  higher risk than others. Your doctor will tell you if you need this shot.     Diabetes Screening:  · A test to measure sugar (glucose) in your blood is called a fasting blood sugar. Fasting means you cannot have food or drink for at least 8 hours before the test. This test can detect diabetes long before you may notice symptoms.    Glaucoma Screening:  · Glaucoma screening is performed by your eye doctor. The test measures the fluid pressure inside your eyes to determine if you have glaucoma.     Hepatitis C Screening:  · A blood test to see if you have the hepatitis C virus.  Hepatitis C attacks the liver and is a major cause of chronic liver disease.  Medicare will cover a single screening for all adults born between 1945 & 1965, or high risk patients (people who have injected illegal drugs or people who have had blood transfusions).  High risk patients who continue to inject illegal drugs can be screened for Hepatitis C every year.    Smoking and Tobacco-Use Cessation Counseling:  · Tobacco is the single greatest cause of disease and early death in our country today. Medication and counseling together can increase a person’s chance of quitting for good.   · Medicare covers two quitting attempts per year, with four counseling sessions per attempt (eight sessions in a 12 month period)    Preventive Screening tests for Women    Screening Mammograms and Breast Exams:  · An x-ray of your breasts to check for breast cancer before you or your doctor may be able to feel it.  If breast cancer is found early it can usually be treated with success.    Pelvic Exams and Pap Tests:  · An exam to check for cervical and vaginal cancer. A Pap test is a lab test in which cells are taken from your cervix and sent to the lab to look for signs of cervical cancer. If cancer of the cervix is found early, chances for a cure are good. Testing can generally end at age 65, or if a woman has a hysterectomy for a benign condition. Your  provider may recommend more frequent testing if certain abnormal results are found.    Bone Mass Measurements:  · A painless x-ray of your bone density to see if you are at risk for a broken bone. Bone density refers to the thickness of bones or how tightly the bone tissue is packed.    Preventive Screening tests for Men    Prostate Screening:  · Should you have a prostate cancer test (PSA)?  It is up to you to decide if you want a prostate cancer test. Talk to your clinician to find out if the test is right for you.  Things for you to consider and talk about should include:  · Benefits and harms of the test  · Your family history  · How your race/ethnicity may influence the test  · If the test may impact other medical conditions you have  · Your values on screenings and treatments    *Medicare pays for many preventive services to keep you healthy. For some of these services, you might have to pay a deductible, coinsurance, and / or copayment.  The amounts vary depending on the type of services you need and the kind of Medicare health plan you have.    For further details on screenings offered by Medicare please visit: https://www.medicare.gov/coverage/preventive-screening-services    105.28

## 2023-01-12 NOTE — PATIENT PROFILE ADULT - FALL HARM RISK - PATIENT NEEDS ASSISTANCE
[FreeTextEntry1] : Elevation NIOX  30  ppb and prior 40 ppb bronchial inflammation- interval impromentg with paucity of resp sxs occ am \par Post COVID with interval improvement\par Pulmonary embolism multiple  with June CT PA  negative/ May 2022 Venous duplex pos superficial thrombosis thigh\par DVT- chronic\par Coronary artery disease with PTCI stent\par GERD\par  Hernia surgery\par Mild Pulmonary HTN\par GI noted \par Gastroenterology consultation follow-up pancreatic cyst incidental finding\par Intraductal papillary mucinous neoplasm of the pancreas patent branch duct variety without interval change or worrisome features\par 1 year follow-up MRI\par \par Recommendations\par Based on the review of his hospitalization and this is a nonprovoked pulmonary embolism/chronic DVT\par Noted there was some question that he was lack of activity but his lack of activity including walking 1 to 1-1/2 miles per day which should eliminate a risk factor of immobilization which did not occur\par  Eliquis treatment protocol 5 mg long term HOLD for Dental 48 hrs prior and 48  hours post Dental work\par Noted that fACTOR  5 Leiden was negative\par noted on Plavix per cardiology- f/u cardiology for  Dental\par COVID Vaccine completed + booster\par venous duplex completed\par   PFT June 2023\par F/U ECHO recheck PAP Oct 2022\par readdress vaccine Flu\par noted need dosing Norvasc\par staes pneumonian  vaccine up to  date\par  Completed COVID  variant Standing/Walking/Toileting

## 2023-01-28 NOTE — PROCEDURE NOTE - NSSIZEINFR_GEN_A_CORE
Implemented All Universal Safety Interventions:  [place] to call system. Call bell, personal items and telephone within reach. Instruct patient to call for assistance. Room bathroom lighting operational. Non-slip footwear when patient is off stretcher. Physically safe environment: no spills, clutter or unnecessary equipment. Stretcher in lowest position, wheels locked, appropriate side rails in place. Cou[place]/16

## 2023-03-14 NOTE — H&P ADULT - REASON FOR ADMISSION
Group Topic: BH Activity Group    Date: 3/14/2023  Start Time: 1745  End Time: 1830  Facilitators: Mary Coley    Focus: Healthy Leisure  Number in attendance: 7    Pt was invited to participate in healthy leisure group by engaging in a social activity such as board games, card games, coloring, and listening to music. Benefits of healthy leisure may include reduction of stress, relaxation, distraction, and overall satisfaction with life.    Pt was recruited for group but did not attend. Efforts to encourage participation in programming on the unit will continue.    Mary Coley M.Ed., CHES, CYT       weakness

## 2023-04-28 NOTE — PATIENT PROFILE ADULT - HAVE YOU HAD A FIRST COVID-19 BOOSTER?
Yes

## 2024-08-25 NOTE — CONSULT NOTE ADULT - NS NEC GEN PE MLT EXAM PC
RESULT INTERPRETATION NOTE  CBC and CMP without major abnormalities, including with normal serum creatinine and potassium, obtained in the setting of Bactrim intake.    Will communicate these results and interpretation with patient/family, through either [employer] message, telephone call, and/or by scheduling a follow-up visit to review these in detail.    Recent results  Lab on 08/20/2024   Component Date Value Ref Range Status    WBC 08/20/2024 6.0  4.5 - 14.5 x10*3/uL Final    nRBC 08/20/2024 0.0  0.0 - 0.0 /100 WBCs Final    RBC 08/20/2024 4.56  4.00 - 5.20 x10*6/uL Final    Hemoglobin 08/20/2024 14.1  11.5 - 15.5 g/dL Final    Hematocrit 08/20/2024 40.1  35.0 - 45.0 % Final    MCV 08/20/2024 88  77 - 95 fL Final    MCH 08/20/2024 30.9  25.0 - 33.0 pg Final    MCHC 08/20/2024 35.2  31.0 - 37.0 g/dL Final    RDW 08/20/2024 12.1  11.5 - 14.5 % Final    Platelets 08/20/2024 273  150 - 400 x10*3/uL Final    Neutrophils % 08/20/2024 46.5  31.0 - 59.0 % Final    Immature Granulocytes %, Automated 08/20/2024 0.0  0.0 - 1.0 % Final    Lymphocytes % 08/20/2024 42.6  35.0 - 65.0 % Final    Monocytes % 08/20/2024 7.9  3.0 - 9.0 % Final    Eosinophils % 08/20/2024 2.2  0.0 - 5.0 % Final    Basophils % 08/20/2024 0.8  0.0 - 1.0 % Final    Neutrophils Absolute 08/20/2024 2.77  1.20 - 7.70 x10*3/uL Final    Immature Granulocytes Absolute, Au* 08/20/2024 0.00  0.00 - 0.10 x10*3/uL Final    Lymphocytes Absolute 08/20/2024 2.54  1.80 - 5.00 x10*3/uL Final    Monocytes Absolute 08/20/2024 0.47  0.10 - 1.10 x10*3/uL Final    Eosinophils Absolute 08/20/2024 0.13  0.00 - 0.70 x10*3/uL Final    Basophils Absolute 08/20/2024 0.05  0.00 - 0.10 x10*3/uL Final    Glucose 08/20/2024 71  60 - 99 mg/dL Final    Sodium 08/20/2024 140  136 - 145 mmol/L Final    Potassium 08/20/2024 3.8  3.3 - 4.7 mmol/L Final    Chloride 08/20/2024 100  98 - 107 mmol/L Final    Bicarbonate 08/20/2024 30 (H)  18 - 27 mmol/L Final    Anion Gap 08/20/2024 14  10 -  30 mmol/L Final    Urea Nitrogen 08/20/2024 12  6 - 23 mg/dL Final    Creatinine 08/20/2024 0.35  0.30 - 0.70 mg/dL Final    eGFR 08/20/2024    Final    Calcium 08/20/2024 10.0  8.5 - 10.7 mg/dL Final    Albumin 08/20/2024 4.7  3.4 - 4.7 g/dL Final    Alkaline Phosphatase 08/20/2024 180  132 - 315 U/L Final    Total Protein 08/20/2024 6.8  6.2 - 7.7 g/dL Final    AST 08/20/2024 22  16 - 40 U/L Final    Bilirubin, Total 08/20/2024 0.6  0.0 - 0.7 mg/dL Final    ALT 08/20/2024 10  3 - 28 U/L Final       No bruits; no thyromegaly or nodules

## 2024-10-09 NOTE — PHYSICAL THERAPY INITIAL EVALUATION ADULT - LEVEL OF INDEPENDENCE: STAND/SIT, REHAB EVAL
Detail Level: Generalized When Should The Patient Follow-Up For Their Next Full-Body Skin Exam?: 3 Months Detail Level: Detailed Quality 137: Melanoma: Continuity Of Care - Recall System: Patient information entered into a recall system that includes: target date for the next exam specified AND a process to follow up with patients regarding missed or unscheduled appointments contact guard

## 2025-01-20 NOTE — DIETITIAN INITIAL EVALUATION ADULT. - NSPROEDAABILITYLEARN_GEN_A_NUR
Dapsone Pregnancy And Lactation Text: This medication is Pregnancy Category C and is not considered safe during pregnancy or breast feeding. Detail Level: Zone Aklief Pregnancy And Lactation Text: It is unknown if this medication is safe to use during pregnancy.  It is unknown if this medication is excreted in breast milk.  Breastfeeding women should use the topical cream on the smallest area of the skin for the shortest time needed while breastfeeding.  Do not apply to nipple and areola. Winlevi Counseling:  I discussed with the patient the risks of topical clascoterone including but not limited to erythema, scaling, itching, and stinging. Patient voiced their understanding. Topical Retinoid Pregnancy And Lactation Text: This medication is Pregnancy Category C. It is unknown if this medication is excreted in breast milk. Minocycline Counseling: Patient advised regarding possible photosensitivity and discoloration of the teeth, skin, lips, tongue and gums.  Patient instructed to avoid sunlight, if possible.  When exposed to sunlight, patients should wear protective clothing, sunglasses, and sunscreen.  The patient was instructed to call the office immediately if the following severe adverse effects occur:  hearing changes, easy bruising/bleeding, severe headache, or vision changes.  The patient verbalized understanding of the proper use and possible adverse effects of minocycline.  All of the patient's questions and concerns were addressed. Tetracycline Pregnancy And Lactation Text: This medication is Pregnancy Category D and not consider safe during pregnancy. It is also excreted in breast milk. Spironolactone Counseling: Patient advised regarding risks of diarrhea, abdominal pain, hyperkalemia, birth defects (for female patients), liver toxicity and renal toxicity. The patient may need blood work to monitor liver and kidney function and potassium levels while on therapy. The patient verbalized understanding of the proper use and possible adverse effects of spironolactone.  All of the patient's questions and concerns were addressed. Doxycycline Pregnancy And Lactation Text: This medication is Pregnancy Category D and not consider safe during pregnancy. It is also excreted in breast milk but is considered safe for shorter treatment courses. Azithromycin Counseling:  I discussed with the patient the risks of azithromycin including but not limited to GI upset, allergic reaction, drug rash, diarrhea, and yeast infections. Sarecycline Counseling: Patient advised regarding possible photosensitivity and discoloration of the teeth, skin, lips, tongue and gums.  Patient instructed to avoid sunlight, if possible.  When exposed to sunlight, patients should wear protective clothing, sunglasses, and sunscreen.  The patient was instructed to call the office immediately if the following severe adverse effects occur:  hearing changes, easy bruising/bleeding, severe headache, or vision changes.  The patient verbalized understanding of the proper use and possible adverse effects of sarecycline.  All of the patient's questions and concerns were addressed. Tazorac Pregnancy And Lactation Text: This medication is not safe during pregnancy. It is unknown if this medication is excreted in breast milk. Topical Sulfur Applications Counseling: Topical Sulfur Counseling: Patient counseled that this medication may cause skin irritation or allergic reactions.  In the event of skin irritation, the patient was advised to reduce the amount of the drug applied or use it less frequently.   The patient verbalized understanding of the proper use and possible adverse effects of topical sulfur application.  All of the patient's questions and concerns were addressed. Benzoyl Peroxide Pregnancy And Lactation Text: This medication is Pregnancy Category C. It is unknown if benzoyl peroxide is excreted in breast milk. Isotretinoin Counseling: Patient should get monthly blood tests, not donate blood, not drive at night if vision affected, not share medication, and not undergo elective surgery for 6 months after tx completed. Side effects reviewed, pt to contact office should one occur. Bactrim Pregnancy And Lactation Text: This medication is Pregnancy Category D and is known to cause fetal risk.  It is also excreted in breast milk. Spironolactone Pregnancy And Lactation Text: This medication can cause feminization of the male fetus and should be avoided during pregnancy. The active metabolite is also found in breast milk. Benzoyl Peroxide Counseling: Patient counseled that medicine may cause skin irritation and bleach clothing.  In the event of skin irritation, the patient was advised to reduce the amount of the drug applied or use it less frequently.   The patient verbalized understanding of the proper use and possible adverse effects of benzoyl peroxide.  All of the patient's questions and concerns were addressed. Topical Clindamycin Counseling: Patient counseled that this medication may cause skin irritation or allergic reactions.  In the event of skin irritation, the patient was advised to reduce the amount of the drug applied or use it less frequently.   The patient verbalized understanding of the proper use and possible adverse effects of clindamycin.  All of the patient's questions and concerns were addressed. Azithromycin Pregnancy And Lactation Text: This medication is considered safe during pregnancy and is also secreted in breast milk. Birth Control Pills Pregnancy And Lactation Text: This medication should be avoided if pregnant and for the first 30 days post-partum. Include Pregnancy/Lactation Warning?: No High Dose Vitamin A Counseling: Side effects reviewed, pt to contact office should one occur. Doxycycline Counseling:  Patient counseled regarding possible photosensitivity and increased risk for sunburn.  Patient instructed to avoid sunlight, if possible.  When exposed to sunlight, patients should wear protective clothing, sunglasses, and sunscreen.  The patient was instructed to call the office immediately if the following severe adverse effects occur:  hearing changes, easy bruising/bleeding, severe headache, or vision changes.  The patient verbalized understanding of the proper use and possible adverse effects of doxycycline.  All of the patient's questions and concerns were addressed. Tazorac Counseling:  Patient advised that medication is irritating and drying.  Patient may need to apply sparingly and wash off after an hour before eventually leaving it on overnight.  The patient verbalized understanding of the proper use and possible adverse effects of tazorac.  All of the patient's questions and concerns were addressed. Winlevi Pregnancy And Lactation Text: This medication is considered safe during pregnancy and breastfeeding. Dapsone Counseling: I discussed with the patient the risks of dapsone including but not limited to hemolytic anemia, agranulocytosis, rashes, methemoglobinemia, kidney failure, peripheral neuropathy, headaches, GI upset, and liver toxicity.  Patients who start dapsone require monitoring including baseline LFTs and weekly CBCs for the first month, then every month thereafter.  The patient verbalized understanding of the proper use and possible adverse effects of dapsone.  All of the patient's questions and concerns were addressed. High Dose Vitamin A Pregnancy And Lactation Text: High dose vitamin A therapy is contraindicated during pregnancy and breast feeding. Azelaic Acid Counseling: Patient counseled that medicine may cause skin irritation and to avoid applying near the eyes.  In the event of skin irritation, the patient was advised to reduce the amount of the drug applied or use it less frequently.   The patient verbalized understanding of the proper use and possible adverse effects of azelaic acid.  All of the patient's questions and concerns were addressed. Erythromycin Counseling:  I discussed with the patient the risks of erythromycin including but not limited to GI upset, allergic reaction, drug rash, diarrhea, increase in liver enzymes, and yeast infections. Isotretinoin Pregnancy And Lactation Text: This medication is Pregnancy Category X and is considered extremely dangerous during pregnancy. It is unknown if it is excreted in breast milk. Bactrim Counseling:  I discussed with the patient the risks of sulfa antibiotics including but not limited to GI upset, allergic reaction, drug rash, diarrhea, dizziness, photosensitivity, and yeast infections.  Rarely, more serious reactions can occur including but not limited to aplastic anemia, agranulocytosis, methemoglobinemia, blood dyscrasias, liver or kidney failure, lung infiltrates or desquamative/blistering drug rashes. Topical Clindamycin Pregnancy And Lactation Text: This medication is Pregnancy Category B and is considered safe during pregnancy. It is unknown if it is excreted in breast milk. Azelaic Acid Pregnancy And Lactation Text: This medication is considered safe during pregnancy and breast feeding. Erythromycin Pregnancy And Lactation Text: This medication is Pregnancy Category B and is considered safe during pregnancy. It is also excreted in breast milk. Aklief counseling:  Patient advised to apply a pea-sized amount only at bedtime and wait 30 minutes after washing their face before applying.  If too drying, patient may add a non-comedogenic moisturizer.  The most commonly reported side effects including irritation, redness, scaling, dryness, stinging, burning, itching, and increased risk of sunburn.  The patient verbalized understanding of the proper use and possible adverse effects of retinoids.  All of the patient's questions and concerns were addressed. Birth Control Pills Counseling: Birth Control Pill Counseling: I discussed with the patient the potential side effects of OCPs including but not limited to increased risk of stroke, heart attack, thrombophlebitis, deep venous thrombosis, hepatic adenomas, breast changes, GI upset, headaches, and depression.  The patient verbalized understanding of the proper use and possible adverse effects of OCPs. All of the patient's questions and concerns were addressed. Tetracycline Counseling: Patient counseled regarding possible photosensitivity and increased risk for sunburn.  Patient instructed to avoid sunlight, if possible.  When exposed to sunlight, patients should wear protective clothing, sunglasses, and sunscreen.  The patient was instructed to call the office immediately if the following severe adverse effects occur:  hearing changes, easy bruising/bleeding, severe headache, or vision changes.  The patient verbalized understanding of the proper use and possible adverse effects of tetracycline.  All of the patient's questions and concerns were addressed. Patient understands to avoid pregnancy while on therapy due to potential birth defects. Topical Sulfur Applications Pregnancy And Lactation Text: This medication is Pregnancy Category C and has an unknown safety profile during pregnancy. It is unknown if this topical medication is excreted in breast milk. Topical Retinoid counseling:  Patient advised to apply a pea-sized amount only at bedtime and wait 30 minutes after washing their face before applying.  If too drying, patient may add a non-comedogenic moisturizer. The patient verbalized understanding of the proper use and possible adverse effects of retinoids.  All of the patient's questions and concerns were addressed. cognitive limitations

## 2025-02-03 NOTE — PATIENT PROFILE ADULT - TOBACCO USE
Duration Of Freeze Thaw-Cycle (Seconds): 0 Show Aperture Variable?: Yes Detail Level: Detailed Render Note In Bullet Format When Appropriate: No Post-Care Instructions: I reviewed with the patient in detail post-care instructions. Patient is to wear sunprotection, and avoid picking at any of the treated lesions. Pt may apply Vaseline to crusted or scabbing areas. Consent: The patient's consent was obtained including but not limited to risks of crusting, scabbing, blistering, scarring, darker or lighter pigmentary change, recurrence, incomplete removal and infection. Former smoker

## 2025-04-16 NOTE — ED PROVIDER NOTE - INPATIENT RESIDENT/ACP NOTIFIED DATE
[facility] for patient to schedule Echo (TTE), ordered by Dr Jaeger.   
25-Jan-2017 21:53
